# Patient Record
Sex: MALE | Race: BLACK OR AFRICAN AMERICAN | NOT HISPANIC OR LATINO | URBAN - METROPOLITAN AREA
[De-identification: names, ages, dates, MRNs, and addresses within clinical notes are randomized per-mention and may not be internally consistent; named-entity substitution may affect disease eponyms.]

---

## 2024-04-01 ENCOUNTER — DOCUMENTATION (OUTPATIENT)
Dept: OCCUPATIONAL THERAPY | Facility: CLINIC | Age: 64
End: 2024-04-01
Payer: COMMERCIAL

## 2024-04-01 NOTE — PROGRESS NOTES
Occupational Therapy                 Therapy Communication Note    Patient Name: Elijah Chase  MRN: [id number]  Today's Date: 4/1/2024     Discipline: Occupational Therapy    Missed Visit Reason: Patient did not attend today's scheduled OT evaluation.     Missed Time: No Show    Comment:

## 2024-04-03 ENCOUNTER — OFFICE VISIT (OUTPATIENT)
Dept: PRIMARY CARE | Facility: CLINIC | Age: 64
End: 2024-04-03
Payer: COMMERCIAL

## 2024-04-03 VITALS
SYSTOLIC BLOOD PRESSURE: 116 MMHG | HEIGHT: 69 IN | OXYGEN SATURATION: 98 % | BODY MASS INDEX: 27.11 KG/M2 | WEIGHT: 183 LBS | HEART RATE: 72 BPM | TEMPERATURE: 98.6 F | DIASTOLIC BLOOD PRESSURE: 78 MMHG | RESPIRATION RATE: 16 BRPM

## 2024-04-03 DIAGNOSIS — E11.9 TYPE 2 DIABETES MELLITUS WITHOUT COMPLICATION, WITHOUT LONG-TERM CURRENT USE OF INSULIN (MULTI): ICD-10-CM

## 2024-04-03 DIAGNOSIS — I69.328 SPEECH OR LANGUAGE DEFICIT, POST-STROKE: ICD-10-CM

## 2024-04-03 DIAGNOSIS — R53.1 WEAKNESS DUE TO ACUTE STROKE (MULTI): ICD-10-CM

## 2024-04-03 DIAGNOSIS — I63.9 WEAKNESS DUE TO ACUTE STROKE (MULTI): ICD-10-CM

## 2024-04-03 DIAGNOSIS — Z09 HOSPITAL DISCHARGE FOLLOW-UP: Primary | ICD-10-CM

## 2024-04-03 DIAGNOSIS — Z86.73 HISTORY OF STROKE: ICD-10-CM

## 2024-04-03 PROCEDURE — 1036F TOBACCO NON-USER: CPT | Performed by: FAMILY MEDICINE

## 2024-04-03 PROCEDURE — 3078F DIAST BP <80 MM HG: CPT | Performed by: FAMILY MEDICINE

## 2024-04-03 PROCEDURE — 3074F SYST BP LT 130 MM HG: CPT | Performed by: FAMILY MEDICINE

## 2024-04-03 PROCEDURE — 99203 OFFICE O/P NEW LOW 30 MIN: CPT | Performed by: FAMILY MEDICINE

## 2024-04-03 NOTE — PATIENT INSTRUCTIONS
Requested prior records (including discharge summary, most recent labs, medication list).  Referred to neurology.    F/U for med refills once medication list available.

## 2024-04-03 NOTE — PROGRESS NOTES
"Subjective   Patient ID: Elijah Chase is a 63 y.o. male who presents for Hospital Follow-up ([place]).    HPI   Initial visit.  No prior records.  Family present and provides some history.  Patient and family members are poor historians.    Reports stroke 3-4 wks ago in [place].  Has residual left side weakness and speech issues.  Sent home w/30 days of meds (does not know names or doses of meds) and will need refills.  States he was told to have outpatient PT/OT.  States paperwork was provided at time of discharge, but did not bring that paperwork today.  Other than the recent stroke, states he has a history of diabetes.  Does not think he has HTN, HLD, or any other conditions.    Reviewed/Updated Active problem list, PSH, SH.    Review of Systems  No other complaints.     Objective   /78   Pulse 72   Temp 37 °C (98.6 °F)   Resp 16   Ht 1.753 m (5' 9\")   Wt 83 kg (183 lb)   SpO2 98%   BMI 27.02 kg/m²     Physical Exam  Constitutional:       General: He is not in acute distress.     Appearance: He is overweight.   Cardiovascular:      Rate and Rhythm: Normal rate and regular rhythm.      Heart sounds: Normal heart sounds. No murmur heard.     No friction rub. No gallop.   Pulmonary:      Effort: Pulmonary effort is normal.      Breath sounds: Normal breath sounds. No wheezing, rhonchi or rales.   Musculoskeletal:      Comments: Ambulating w/Rollator   Neurological:      Mental Status: He is oriented to person, place, and time.   Psychiatric:         Mood and Affect: Mood normal.         Behavior: Behavior normal.     Assessment/Plan   Diagnoses and all orders for this visit:  Hospital discharge follow-up  -     Referral to Neurology; Future  History of stroke  -     Referral to Neurology; Future  Weakness due to acute stroke (CMS/HCC)  -     Referral to Neurology; Future  Speech or language deficit, post-stroke  -     Referral to Neurology; Future  Type 2 diabetes mellitus without complication, " without long-term current use of insulin (CMS/[facility])    Requested prior records (including discharge summary, most recent labs, medication list).  Referred to neurology.    F/U for med refills once medication list available.

## 2024-04-04 ENCOUNTER — DOCUMENTATION (OUTPATIENT)
Dept: PHYSICAL THERAPY | Facility: CLINIC | Age: 64
End: 2024-04-04
Payer: COMMERCIAL

## 2024-04-04 NOTE — PROGRESS NOTES
Physical Therapy                 Therapy Communication Note    Patient Name: Elijah Chase  MRN: [id number]  Today's Date: 4/4/2024     Discipline: Physical Therapy    Comment: No show to initial eval

## 2024-04-08 ENCOUNTER — HOME HEALTH ADMISSION (OUTPATIENT)
Dept: HOME HEALTH SERVICES | Facility: HOME HEALTH | Age: 64
End: 2024-04-08

## 2024-04-08 ENCOUNTER — CONSULT (OUTPATIENT)
Dept: PHYSICAL MEDICINE AND REHAB | Facility: CLINIC | Age: 64
End: 2024-04-08
Payer: COMMERCIAL

## 2024-04-08 ENCOUNTER — DOCUMENTATION (OUTPATIENT)
Dept: HOME HEALTH SERVICES | Facility: HOME HEALTH | Age: 64
End: 2024-04-08

## 2024-04-08 ENCOUNTER — APPOINTMENT (OUTPATIENT)
Dept: NEUROSURGERY | Facility: CLINIC | Age: 64
End: 2024-04-08
Payer: COMMERCIAL

## 2024-04-08 ENCOUNTER — LAB (OUTPATIENT)
Dept: LAB | Facility: LAB | Age: 64
End: 2024-04-08
Payer: COMMERCIAL

## 2024-04-08 VITALS
DIASTOLIC BLOOD PRESSURE: 66 MMHG | SYSTOLIC BLOOD PRESSURE: 100 MMHG | HEART RATE: 87 BPM | TEMPERATURE: 98.8 F | RESPIRATION RATE: 18 BRPM

## 2024-04-08 DIAGNOSIS — I63.9 WEAKNESS DUE TO ACUTE STROKE (MULTI): ICD-10-CM

## 2024-04-08 DIAGNOSIS — Z86.73 HISTORY OF STROKE: Primary | ICD-10-CM

## 2024-04-08 DIAGNOSIS — I63.9 WEAKNESS DUE TO ACUTE STROKE (MULTI): Primary | ICD-10-CM

## 2024-04-08 DIAGNOSIS — R53.1 WEAKNESS DUE TO ACUTE STROKE (MULTI): ICD-10-CM

## 2024-04-08 DIAGNOSIS — I69.328 SPEECH OR LANGUAGE DEFICIT, POST-STROKE: ICD-10-CM

## 2024-04-08 DIAGNOSIS — R30.0 DYSURIA: ICD-10-CM

## 2024-04-08 DIAGNOSIS — R53.1 WEAKNESS DUE TO ACUTE STROKE (MULTI): Primary | ICD-10-CM

## 2024-04-08 DIAGNOSIS — R45.89 DEPRESSED MOOD: ICD-10-CM

## 2024-04-08 PROBLEM — E78.49 OTHER HYPERLIPIDEMIA: Status: ACTIVE | Noted: 2024-04-08

## 2024-04-08 PROBLEM — I10 PRIMARY HYPERTENSION: Status: ACTIVE | Noted: 2024-04-08

## 2024-04-08 PROBLEM — E11.9 TYPE 2 DIABETES MELLITUS WITHOUT COMPLICATION, WITHOUT LONG-TERM CURRENT USE OF INSULIN (MULTI): Status: ACTIVE | Noted: 2024-04-08

## 2024-04-08 PROBLEM — F32.9 REACTIVE DEPRESSION: Status: ACTIVE | Noted: 2024-04-08

## 2024-04-08 LAB
ANION GAP SERPL CALC-SCNC: 18 MMOL/L (ref 10–20)
APPEARANCE UR: ABNORMAL
BILIRUB UR STRIP.AUTO-MCNC: NEGATIVE MG/DL
BUN SERPL-MCNC: 16 MG/DL (ref 6–23)
CALCIUM SERPL-MCNC: 9.8 MG/DL (ref 8.6–10.3)
CHLORIDE SERPL-SCNC: 96 MMOL/L (ref 98–107)
CO2 SERPL-SCNC: 24 MMOL/L (ref 21–32)
COLOR UR: YELLOW
CREAT SERPL-MCNC: 0.83 MG/DL (ref 0.5–1.3)
EGFRCR SERPLBLD CKD-EPI 2021: >90 ML/MIN/1.73M*2
GLUCOSE SERPL-MCNC: 92 MG/DL (ref 74–99)
GLUCOSE UR STRIP.AUTO-MCNC: NEGATIVE MG/DL
KETONES UR STRIP.AUTO-MCNC: ABNORMAL MG/DL
LEUKOCYTE ESTERASE UR QL STRIP.AUTO: ABNORMAL
MUCOUS THREADS #/AREA URNS AUTO: ABNORMAL /LPF
NITRITE UR QL STRIP.AUTO: POSITIVE
PH UR STRIP.AUTO: 5.5 [PH]
POTASSIUM SERPL-SCNC: 4.4 MMOL/L (ref 3.5–5.3)
PROT UR STRIP.AUTO-MCNC: ABNORMAL MG/DL
RBC # UR STRIP.AUTO: ABNORMAL /UL
RBC #/AREA URNS AUTO: >20 /HPF
SODIUM SERPL-SCNC: 134 MMOL/L (ref 136–145)
SP GR UR STRIP.AUTO: >=1.03
UROBILINOGEN UR STRIP.AUTO-MCNC: 1 MG/DL
WBC #/AREA URNS AUTO: >50 /HPF
WBC CLUMPS #/AREA URNS AUTO: ABNORMAL /HPF

## 2024-04-08 PROCEDURE — 36415 COLL VENOUS BLD VENIPUNCTURE: CPT

## 2024-04-08 PROCEDURE — 87186 SC STD MICRODIL/AGAR DIL: CPT

## 2024-04-08 PROCEDURE — 80048 BASIC METABOLIC PNL TOTAL CA: CPT

## 2024-04-08 PROCEDURE — 81001 URINALYSIS AUTO W/SCOPE: CPT

## 2024-04-08 PROCEDURE — 99204 OFFICE O/P NEW MOD 45 MIN: CPT | Performed by: PHYSICAL MEDICINE & REHABILITATION

## 2024-04-08 PROCEDURE — 99214 OFFICE O/P EST MOD 30 MIN: CPT | Mod: GC | Performed by: PHYSICAL MEDICINE & REHABILITATION

## 2024-04-08 PROCEDURE — 87086 URINE CULTURE/COLONY COUNT: CPT

## 2024-04-08 RX ORDER — METFORMIN HYDROCHLORIDE EXTENDED-RELEASE TABLETS 500 MG/1
500 TABLET, FILM COATED, EXTENDED RELEASE ORAL
COMMUNITY
End: 2024-04-29 | Stop reason: SDUPTHER

## 2024-04-08 RX ORDER — INSULIN GLARGINE 100 [IU]/ML
10 INJECTION, SOLUTION SUBCUTANEOUS NIGHTLY
COMMUNITY
End: 2024-04-29 | Stop reason: SDUPTHER

## 2024-04-08 RX ORDER — BISMUTH SUBSALICYLATE 262 MG
1 TABLET,CHEWABLE ORAL DAILY
COMMUNITY

## 2024-04-08 RX ORDER — ATORVASTATIN CALCIUM 40 MG/1
40 TABLET, FILM COATED ORAL DAILY
COMMUNITY
End: 2024-04-29 | Stop reason: SDUPTHER

## 2024-04-08 RX ORDER — LISINOPRIL 20 MG/1
20 TABLET ORAL DAILY
COMMUNITY
End: 2024-04-29 | Stop reason: SDUPTHER

## 2024-04-08 RX ORDER — ESCITALOPRAM OXALATE 10 MG/1
10 TABLET ORAL DAILY
COMMUNITY

## 2024-04-08 RX ORDER — ASPIRIN 81 MG/1
81 TABLET ORAL DAILY
COMMUNITY
End: 2024-04-29 | Stop reason: SDUPTHER

## 2024-04-08 ASSESSMENT — PAIN SCALES - GENERAL: PAINLEVEL: 5

## 2024-04-08 NOTE — PROGRESS NOTES
HPI  Mr Elijah Chase, a 63 y.o. year old male with pmhx of HTN and DM here with his daughter and brother here for evaluation of stroke. Patient had a right sided stroke 3/8/24 at OSH in [place]. Patient went to inpatient rehabilitation at [facility] for 2 weeks and completed IPR on 3/31/24. They did not set patient up with PT or OT as they were not within the state. Has not been doing formal therapy since discharge from IPR. Deficits following stroke include left sided weakness, difficulties with swallowing, speech, cognition but symptoms have been improving. Reports dysuria, as well as difficulties with urinary urgency, incontinence, foul smelling urine. No urinary issues prior to CVA. Currently urinating with bedside urinal. Also having right sided flank pain. Also having difficulties with vision on left, possible neglect on the left side. Denies any spasticity.    Reports depressed mood, crying, difficulty adjusting. Also having difficulties with sleep. Difficulties staying asleep, taking melatonin sporadically from daughter.     They do not have imaging or records from hospitalization or IPR with him    IMAGING: Had done at OSH, no records in system, unclear location of stroke, likely right sided with left sided deficits based on presentation     FUNCTIONAL HISTORY: Using FWW to ambulate, was independent prior to CVA     SH:  Patient ambulates with a FWW. Patient was completed independent prior to CVA. Does need help with dressing. Has rails for bathroom. He is currently living with his daughter and daughters , 1 JANNA, bed and bath on first floor. Living in [place]  Occupation: Was driving a forklift prior to CVA  Tobacco/Alcohol/Drugs : Denies    PE:  GEN - Alert, well-developed, well-nourished, no acute distress  PSYCH - Cooperative, appropriate mood and affect  HEENT - NC/AT  RESP - Non-labored respirations, equal expansion  CV - warm and well-perfused, No cyanosis or edema in extremities. DP pulses  2+ bilaterally  ABD- soft, ND  SKIN - No rash.    Mild dysarthria, delayed recall/processing     NEURO - UE strength 4+/5 on left, 5/5 on right-  including shoulder abduction, biceps, triceps, wrist extensors, finger flexors, interossei, and   LE strength 4+/5 on left, 5/5 on right -  including hip flexors, knee flexors, knee extensors, ankle dorsiflexors, ankle plantar flexors, and EHL  Sensation - intact to light touch in bilateral lower extremities.  GAIT - Uses FWW, poor balance  Dysdiadochokinesia with difficulty on right, F-N difficulty L worse than right  CN testing: Left sided facial droop, otherwise in tact in cranial nerves (other than VII)    Impression/PLAN:  Elijah Chase is a 63 year old male with pmhx of HTN, DM here for evaluation of CVA. Patient does not have imaging records with him but stroke appears likely right sided with left sided deficits. Would need prior/new MRI to determine exact location of infarct. Deficits include left sided weakness, speech, cognitive, swallowing difficulties. Also difficulties with depressed mood.    -New PT and OT referrals placed, emphasis on neurologic aspect of therapy  -already has ST ordered through PCP for speech and cog, dysarthria, word finding difficulties. Per family is on normal diet.  -Psych referral placed for adjustment  -UA ordered due to dysuria, discolored urine, frequency. Potential UTI although neurogenic bladder may be contributing  -Handicap placard ordered  -Agree with establishing with neurology, PCP placed neuro referral. Discussed to get records from inpatient hospitalization re location of stroke etc  -BMP ordered for baseline lab and kidney function  -Follow up in 6-8 weeks        The patient expressed understanding and agreement with the assessment and plan. Patient encouraged to contact us should they have any questions, concerns, or any changes in symptoms.     Thank you for allowing me to participate in the care of your  patient.    Patient seen and examined by resident physician with attending physician supervision, Dr. Marinelli. Attending agrees with history, assessment, and plan as described in note.    Cheri Spain DO   PM&R PGY-4     Supervisory note:  Seen with Dr Spain, resident.  I saw and evaluated the patient. I personally obtained the key and critical portions of the history and physical exam. I reviewed the resident's documentation and discussed the patient with the resident. I agree with the resident's medical decision making as documented in the resident's note.       Priyanka Randle MD     Division of PM&R

## 2024-04-08 NOTE — HH CARE COORDINATION
Home Care received a referral for Home Health Aide. Unfortunately, we are unable to accept and process the referral at this time.    Patients, please reach out to the referring provider or your PCP to assist in obtaining an alternative home care agency and/or guidance to meet your needs.    Providers, please reach out to  Home Care with any questions regarding the declined referral.

## 2024-04-09 ENCOUNTER — TELEPHONE (OUTPATIENT)
Dept: PHYSICAL MEDICINE AND REHAB | Facility: CLINIC | Age: 64
End: 2024-04-09
Payer: COMMERCIAL

## 2024-04-09 DIAGNOSIS — N39.0 URINARY TRACT INFECTION WITHOUT HEMATURIA, SITE UNSPECIFIED: Primary | ICD-10-CM

## 2024-04-09 RX ORDER — NITROFURANTOIN 25; 75 MG/1; MG/1
100 CAPSULE ORAL 2 TIMES DAILY
Qty: 14 CAPSULE | Refills: 0 | Status: SHIPPED | OUTPATIENT
Start: 2024-04-09 | End: 2024-04-16

## 2024-04-09 NOTE — TELEPHONE ENCOUNTER
UA is  positive for UTI, will call in bactrim and wait for culture as well but he can start on the anti biotic now.

## 2024-04-10 ENCOUNTER — TELEPHONE (OUTPATIENT)
Dept: PHYSICAL MEDICINE AND REHAB | Facility: CLINIC | Age: 64
End: 2024-04-10
Payer: COMMERCIAL

## 2024-04-10 NOTE — TELEPHONE ENCOUNTER
Pt daughter called and was wondering if she could get a hyperbaric chamber.      Also she was wondering if anything else showed up on the urine culture.

## 2024-04-11 ENCOUNTER — TELEPHONE (OUTPATIENT)
Dept: PHYSICAL MEDICINE AND REHAB | Facility: CLINIC | Age: 64
End: 2024-04-11
Payer: COMMERCIAL

## 2024-04-11 DIAGNOSIS — R30.0 DYSURIA: ICD-10-CM

## 2024-04-11 DIAGNOSIS — R45.89 DEPRESSED MOOD: ICD-10-CM

## 2024-04-11 DIAGNOSIS — Z74.09 IMPAIRED MOBILITY AND ACTIVITIES OF DAILY LIVING: ICD-10-CM

## 2024-04-11 DIAGNOSIS — Z78.9 IMPAIRED MOBILITY AND ACTIVITIES OF DAILY LIVING: ICD-10-CM

## 2024-04-11 DIAGNOSIS — I63.9 WEAKNESS DUE TO ACUTE STROKE (MULTI): Primary | ICD-10-CM

## 2024-04-11 DIAGNOSIS — I69.328 SPEECH OR LANGUAGE DEFICIT, POST-STROKE: ICD-10-CM

## 2024-04-11 DIAGNOSIS — R53.1 WEAKNESS DUE TO ACUTE STROKE (MULTI): Primary | ICD-10-CM

## 2024-04-11 LAB — BACTERIA UR CULT: ABNORMAL

## 2024-04-11 NOTE — TELEPHONE ENCOUNTER
The bacteria in the urine is susceptible to the anti biotic.  I am not sure what she is referring to and I have not ordered this/not sure its something that can be ordered for the house. I have not heard of this use after a stroke.

## 2024-04-12 ENCOUNTER — DOCUMENTATION (OUTPATIENT)
Dept: HOME HEALTH SERVICES | Facility: HOME HEALTH | Age: 64
End: 2024-04-12
Payer: COMMERCIAL

## 2024-04-12 ENCOUNTER — HOME HEALTH ADMISSION (OUTPATIENT)
Dept: HOME HEALTH SERVICES | Facility: HOME HEALTH | Age: 64
End: 2024-04-12
Payer: COMMERCIAL

## 2024-04-12 NOTE — HH CARE COORDINATION
Home Care received a Referral for Nursing, Physical Therapy, Occupational Therapy, Home Health Aide, Speech Language Pathology, and Medical Social Work. We have processed the referral for a Start of Care on 24-48 HOURS .     If you have any questions or concerns, please feel free to contact us at [phone]. Follow the prompts, enter your five digit zip code, and you will be directed to your care team on CENT[facility] 2.

## 2024-04-13 ENCOUNTER — HOME CARE VISIT (OUTPATIENT)
Dept: HOME HEALTH SERVICES | Facility: HOME HEALTH | Age: 64
End: 2024-04-13
Payer: COMMERCIAL

## 2024-04-13 PROCEDURE — 0023 HH SOC

## 2024-04-13 PROCEDURE — G0299 HHS/HOSPICE OF RN EA 15 MIN: HCPCS

## 2024-04-14 ENCOUNTER — HOME CARE VISIT (OUTPATIENT)
Dept: HOME HEALTH SERVICES | Facility: HOME HEALTH | Age: 64
End: 2024-04-14
Payer: COMMERCIAL

## 2024-04-14 VITALS
OXYGEN SATURATION: 98 % | DIASTOLIC BLOOD PRESSURE: 60 MMHG | SYSTOLIC BLOOD PRESSURE: 120 MMHG | TEMPERATURE: 98.7 F | RESPIRATION RATE: 18 BRPM | HEIGHT: 69 IN | BODY MASS INDEX: 27.11 KG/M2 | WEIGHT: 183 LBS | HEART RATE: 72 BPM

## 2024-04-14 DIAGNOSIS — E11.9 TYPE 2 DIABETES MELLITUS WITHOUT COMPLICATION, WITHOUT LONG-TERM CURRENT USE OF INSULIN (MULTI): Primary | ICD-10-CM

## 2024-04-14 PROCEDURE — G0151 HHCP-SERV OF PT,EA 15 MIN: HCPCS

## 2024-04-14 RX ORDER — PEN NEEDLE, DIABETIC 30 GX3/16"
NEEDLE, DISPOSABLE MISCELLANEOUS
Qty: 100 EACH | Refills: 3 | Status: SHIPPED | OUTPATIENT
Start: 2024-04-14 | End: 2024-04-16 | Stop reason: RX

## 2024-04-14 SDOH — HEALTH STABILITY: PHYSICAL HEALTH: EXERCISE TYPE: STANDING HEP FOR BALANCE AND STRENGTH. SEATED LLE EXERCISES WITH RED THERA BAND.

## 2024-04-14 SDOH — ECONOMIC STABILITY: HOUSING INSECURITY: HOME SAFETY: PATIENT WAS NOT USING THE BRAKES ON THE ROLLATOR BEFORE SITTING DOWN ON IT.

## 2024-04-14 ASSESSMENT — ENCOUNTER SYMPTOMS
APPETITE LEVEL: GOOD
DEPRESSION: 0
PERSON REPORTING PAIN: PATIENT
DENIES PAIN: 1
MUSCLE WEAKNESS: 1
CHANGE IN APPETITE: UNCHANGED
OCCASIONAL FEELINGS OF UNSTEADINESS: 1
OCCASIONAL FEELINGS OF UNSTEADINESS: 1
DENIES PAIN: 1
LOSS OF SENSATION IN FEET: 0

## 2024-04-14 ASSESSMENT — PAIN SCALES - PAIN ASSESSMENT IN ADVANCED DEMENTIA (PAINAD)
BODYLANGUAGE: 0
BODYLANGUAGE: 0 - RELAXED.
FACIALEXPRESSION: 0
CONSOLABILITY: 0
CONSOLABILITY: 0 - NO NEED TO CONSOLE.
NEGVOCALIZATION: 0 - NONE.
NEGVOCALIZATION: 0
FACIALEXPRESSION: 0 - SMILING OR INEXPRESSIVE.

## 2024-04-14 ASSESSMENT — ACTIVITIES OF DAILY LIVING (ADL)
OASIS_M1830: 02
ENTERING_EXITING_HOME: NEEDS ASSISTANCE

## 2024-04-15 ENCOUNTER — HOME CARE VISIT (OUTPATIENT)
Dept: HOME HEALTH SERVICES | Facility: HOME HEALTH | Age: 64
End: 2024-04-15
Payer: COMMERCIAL

## 2024-04-15 VITALS
DIASTOLIC BLOOD PRESSURE: 70 MMHG | TEMPERATURE: 97.9 F | HEART RATE: 60 BPM | SYSTOLIC BLOOD PRESSURE: 110 MMHG | OXYGEN SATURATION: 98 %

## 2024-04-15 PROCEDURE — G0152 HHCP-SERV OF OT,EA 15 MIN: HCPCS

## 2024-04-15 PROCEDURE — G0155 HHCP-SVS OF CSW,EA 15 MIN: HCPCS

## 2024-04-15 SDOH — SOCIAL STABILITY: SOCIAL NETWORK: HELP FROM FAMILY/FRIENDS: YES

## 2024-04-15 SDOH — HEALTH STABILITY: MENTAL HEALTH: STRESS FACTORS COMMENTS: RECENT STROKE

## 2024-04-15 ASSESSMENT — ACTIVITIES OF DAILY LIVING (ADL)
BATHING ASSESSED: 1
LAUNDRY_REQUIRES_ASSISTANCE: 1
DRESSING_UB_CURRENT_FUNCTION: STAND BY ASSIST
WASHING_LB_CURRENT_FUNCTION: MINIMUM ASSIST
WASHING_UPB_CURRENT_FUNCTION: MINIMUM ASSIST
SHOPPING_REQUIRES_ASSISTANCE: 1
BATHING_CURRENT_FUNCTION: MINIMUM ASSIST
DRESSING_LB_CURRENT_FUNCTION: STAND BY ASSIST

## 2024-04-15 ASSESSMENT — ENCOUNTER SYMPTOMS
DENIES PAIN: 1
PERSON REPORTING PAIN: PATIENT

## 2024-04-16 ENCOUNTER — APPOINTMENT (OUTPATIENT)
Dept: NEUROSURGERY | Facility: CLINIC | Age: 64
End: 2024-04-16
Payer: COMMERCIAL

## 2024-04-16 DIAGNOSIS — E11.9 TYPE 2 DIABETES MELLITUS WITHOUT COMPLICATION, WITHOUT LONG-TERM CURRENT USE OF INSULIN (MULTI): ICD-10-CM

## 2024-04-16 RX ORDER — PEN NEEDLE, DIABETIC 30 GX3/16"
NEEDLE, DISPOSABLE MISCELLANEOUS
Qty: 100 EACH | Refills: 3 | Status: SHIPPED | OUTPATIENT
Start: 2024-04-16

## 2024-04-17 ENCOUNTER — HOME CARE VISIT (OUTPATIENT)
Dept: HOME HEALTH SERVICES | Facility: HOME HEALTH | Age: 64
End: 2024-04-17
Payer: COMMERCIAL

## 2024-04-17 ENCOUNTER — APPOINTMENT (OUTPATIENT)
Dept: PHYSICAL THERAPY | Facility: HOSPITAL | Age: 64
End: 2024-04-17
Payer: COMMERCIAL

## 2024-04-17 PROCEDURE — G0156 HHCP-SVS OF AIDE,EA 15 MIN: HCPCS

## 2024-04-18 ENCOUNTER — HOME CARE VISIT (OUTPATIENT)
Dept: HOME HEALTH SERVICES | Facility: HOME HEALTH | Age: 64
End: 2024-04-18
Payer: COMMERCIAL

## 2024-04-18 VITALS — HEART RATE: 66 BPM | TEMPERATURE: 98.1 F

## 2024-04-18 PROCEDURE — G0180 MD CERTIFICATION HHA PATIENT: HCPCS | Performed by: PHYSICAL MEDICINE & REHABILITATION

## 2024-04-18 PROCEDURE — G0158 HHC OT ASSISTANT EA 15: HCPCS | Mod: CO

## 2024-04-19 ENCOUNTER — HOME CARE VISIT (OUTPATIENT)
Dept: HOME HEALTH SERVICES | Facility: HOME HEALTH | Age: 64
End: 2024-04-19
Payer: COMMERCIAL

## 2024-04-19 PROCEDURE — G0157 HHC PT ASSISTANT EA 15: HCPCS | Mod: CQ

## 2024-04-19 PROCEDURE — G0156 HHCP-SVS OF AIDE,EA 15 MIN: HCPCS

## 2024-04-22 ENCOUNTER — HOME CARE VISIT (OUTPATIENT)
Dept: HOME HEALTH SERVICES | Facility: HOME HEALTH | Age: 64
End: 2024-04-22
Payer: COMMERCIAL

## 2024-04-22 VITALS — HEART RATE: 78 BPM | DIASTOLIC BLOOD PRESSURE: 52 MMHG | SYSTOLIC BLOOD PRESSURE: 117 MMHG

## 2024-04-22 PROCEDURE — G0153 HHCP-SVS OF S/L PATH,EA 15MN: HCPCS

## 2024-04-22 PROCEDURE — G0157 HHC PT ASSISTANT EA 15: HCPCS | Mod: CQ

## 2024-04-23 ENCOUNTER — HOME CARE VISIT (OUTPATIENT)
Dept: HOME HEALTH SERVICES | Facility: HOME HEALTH | Age: 64
End: 2024-04-23
Payer: COMMERCIAL

## 2024-04-23 VITALS
RESPIRATION RATE: 16 BRPM | SYSTOLIC BLOOD PRESSURE: 107 MMHG | DIASTOLIC BLOOD PRESSURE: 56 MMHG | TEMPERATURE: 98.1 F | HEART RATE: 71 BPM

## 2024-04-23 PROCEDURE — G0158 HHC OT ASSISTANT EA 15: HCPCS | Mod: CO

## 2024-04-23 PROCEDURE — G0156 HHCP-SVS OF AIDE,EA 15 MIN: HCPCS

## 2024-04-23 NOTE — PROGRESS NOTES
NPV - Presented to [facility] in [place], [place] on 3/8/2024 with wake up stroke symptoms of left sided weakness and facial droop he was out of the window for tPA. Diagnostic Angio on admit noted Right M1 Severe stenosis with delayed flow. Angio on 3/10/24 by Dr. 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 He has now moved to [place] to be with his daughter and family. Here for follow up stenting.     Elijah Chase is a 63 y.o. year old male    HPI  Mr. Chase is a 63-year-old gentleman who was in [facility] when he developed left-sided weakness and facial droop and diagnosed with wake-up stroke.  He was treated at [facility] with a balloon mounted coronary stent for a severe right M1 stenosis.   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  He was given a prescription for 30 days of aspirin and Brilinta and was discharged to rehab.  He was told to follow-up with a neurosurgeon up in [place] where he lives.    He has residual left-sided weakness and left facial droop.  He has some imbalance due to the weakness    Review of Systems       No past medical history on file.    No past surgical history on file.        Current Outpatient Medications:     amoxicillin-pot clavulanate (Augmentin) 875-125 mg tablet, Take 1 tablet (875 mg) by mouth 2 times a day for 10 days. With food, Disp: 20 tablet, Rfl: 0    aspirin 81 mg EC tablet, Take 1 tablet (81 mg) by mouth once daily., Disp: 5 tablet, Rfl: 0    atorvastatin (Lipitor) 40 mg tablet, Take 1 tablet (40 mg) by mouth once daily., Disp: 5 tablet, Rfl: 0    insulin glargine (Lantus U-100 Insulin) 100 unit/mL injection, Inject 10 Units under the skin once daily at bedtime. Take as directed per insulin instructions., Disp: 10 mL, Rfl: 0    lisinopril 20 mg tablet, Take 1  "tablet (20 mg) by mouth once daily., Disp: 5 tablet, Rfl: 0    metFORMIN, OSM, (Fortamet) 500 mg 24 hr tablet, Take 1 tablet (500 mg) by mouth once daily in the evening. Take with meals. Do not crush, chew, or split., Disp: 5 tablet, Rfl: 0    multivitamin tablet, Take 1 tablet by mouth once daily., Disp: , Rfl:     pen needle, diabetic 32 gauge x 5/32\" needle, Use daily w/Lantus, Disp: 100 each, Rfl: 3    vitamin D3-vitamin K2, MK4, 1,000-100 unit-mcg tablet, Take 1,000 Units by mouth once daily., Disp: , Rfl:     escitalopram (Lexapro) 10 mg tablet, Take 1 tablet (10 mg) by mouth once daily., Disp: , Rfl:     ticagrelor (Brilinta) 90 mg tablet, Take 1 tablet (90 mg) by mouth 2 times a day., Disp: 60 tablet, Rfl: 5        Objective   Vitals:    04/30/24 0957   BP: 109/68   Pulse: 57   Resp: 16   Temp: 36.3 °C (97.4 °F)       Neurological Exam  AAO x 3  PERRL, EOMI, TML, left facial droop  5/5 on right, 4/5 on left  Senorsy intact  No drift  Able to ambulate with a walker    [unfilled]  No imaging.      Assessment/Plan     I had the pleasure of seeing Mr. Chase and his daughter and had a thorough discussion was reviewed his reports on her phone.    Per report, he had a balloon mounted coronary stent of unknown name and brand that was placed in his right M1 for severe symptomatic stenosis.  There was also an iatrogenic right cavernous dissection that required an [place] stent.    I have refilled his aspirin and Brilinta for the next 5 months.  He will need a cerebral angiogram to evaluate in about 6 months from the procedure which occurred in March 2024.  Will also refer him to a stroke neurology for stroke risk reduction management.    All question was answered to her satisfaction.    "

## 2024-04-24 ENCOUNTER — APPOINTMENT (OUTPATIENT)
Dept: SPEECH THERAPY | Facility: CLINIC | Age: 64
End: 2024-04-24
Payer: COMMERCIAL

## 2024-04-24 ENCOUNTER — HOME CARE VISIT (OUTPATIENT)
Dept: HOME HEALTH SERVICES | Facility: HOME HEALTH | Age: 64
End: 2024-04-24
Payer: COMMERCIAL

## 2024-04-24 ENCOUNTER — APPOINTMENT (OUTPATIENT)
Dept: OCCUPATIONAL THERAPY | Facility: CLINIC | Age: 64
End: 2024-04-24
Payer: COMMERCIAL

## 2024-04-24 ENCOUNTER — APPOINTMENT (OUTPATIENT)
Dept: PHYSICAL THERAPY | Facility: CLINIC | Age: 64
End: 2024-04-24

## 2024-04-24 PROCEDURE — G0157 HHC PT ASSISTANT EA 15: HCPCS | Mod: CQ

## 2024-04-25 ENCOUNTER — HOME CARE VISIT (OUTPATIENT)
Dept: HOME HEALTH SERVICES | Facility: HOME HEALTH | Age: 64
End: 2024-04-25
Payer: COMMERCIAL

## 2024-04-25 VITALS
SYSTOLIC BLOOD PRESSURE: 108 MMHG | TEMPERATURE: 98.1 F | DIASTOLIC BLOOD PRESSURE: 70 MMHG | HEART RATE: 82 BPM | RESPIRATION RATE: 16 BRPM

## 2024-04-25 DIAGNOSIS — E11.9 TYPE 2 DIABETES MELLITUS WITHOUT COMPLICATION, WITHOUT LONG-TERM CURRENT USE OF INSULIN (MULTI): Primary | ICD-10-CM

## 2024-04-25 PROCEDURE — G0158 HHC OT ASSISTANT EA 15: HCPCS | Mod: CO

## 2024-04-26 ENCOUNTER — LAB (OUTPATIENT)
Dept: LAB | Facility: LAB | Age: 64
End: 2024-04-26
Payer: COMMERCIAL

## 2024-04-26 ENCOUNTER — TELEPHONE (OUTPATIENT)
Dept: PHYSICAL MEDICINE AND REHAB | Facility: CLINIC | Age: 64
End: 2024-04-26

## 2024-04-26 DIAGNOSIS — R30.0 DYSURIA: ICD-10-CM

## 2024-04-26 DIAGNOSIS — E11.9 TYPE 2 DIABETES MELLITUS WITHOUT COMPLICATION, WITHOUT LONG-TERM CURRENT USE OF INSULIN (MULTI): ICD-10-CM

## 2024-04-26 DIAGNOSIS — R30.0 DYSURIA: Primary | ICD-10-CM

## 2024-04-26 PROCEDURE — 87186 SC STD MICRODIL/AGAR DIL: CPT

## 2024-04-26 PROCEDURE — 81001 URINALYSIS AUTO W/SCOPE: CPT

## 2024-04-26 PROCEDURE — 87086 URINE CULTURE/COLONY COUNT: CPT

## 2024-04-26 PROCEDURE — 82043 UR ALBUMIN QUANTITATIVE: CPT

## 2024-04-26 PROCEDURE — 82570 ASSAY OF URINE CREATININE: CPT

## 2024-04-26 NOTE — TELEPHONE ENCOUNTER
Pt daughter called and stated that pt is still having difficulty with UTI.  Still has same symptoms dark urine, pain, difficulty with urination.  Pt daughter wanted to know if you could prescribe another antibiotic.

## 2024-04-27 ENCOUNTER — APPOINTMENT (OUTPATIENT)
Dept: HOME HEALTH SERVICES | Facility: HOME HEALTH | Age: 64
End: 2024-04-27
Payer: COMMERCIAL

## 2024-04-27 LAB
APPEARANCE UR: ABNORMAL
BACTERIA #/AREA URNS AUTO: ABNORMAL /HPF
BILIRUB UR STRIP.AUTO-MCNC: NEGATIVE MG/DL
COLOR UR: ABNORMAL
GLUCOSE UR STRIP.AUTO-MCNC: NORMAL MG/DL
HOLD SPECIMEN: NORMAL
KETONES UR STRIP.AUTO-MCNC: NEGATIVE MG/DL
LEUKOCYTE ESTERASE UR QL STRIP.AUTO: ABNORMAL
MUCOUS THREADS #/AREA URNS AUTO: ABNORMAL /LPF
NITRITE UR QL STRIP.AUTO: NEGATIVE
PH UR STRIP.AUTO: 5 [PH]
PROT UR STRIP.AUTO-MCNC: NEGATIVE MG/DL
RBC # UR STRIP.AUTO: ABNORMAL /UL
RBC #/AREA URNS AUTO: ABNORMAL /HPF
SP GR UR STRIP.AUTO: 1.01
UROBILINOGEN UR STRIP.AUTO-MCNC: NORMAL MG/DL
WBC #/AREA URNS AUTO: >50 /HPF
WBC CLUMPS #/AREA URNS AUTO: ABNORMAL /HPF

## 2024-04-29 ENCOUNTER — TELEPHONE (OUTPATIENT)
Dept: PRIMARY CARE | Facility: CLINIC | Age: 64
End: 2024-04-29
Payer: COMMERCIAL

## 2024-04-29 ENCOUNTER — APPOINTMENT (OUTPATIENT)
Dept: HOME HEALTH SERVICES | Facility: HOME HEALTH | Age: 64
End: 2024-04-29
Payer: COMMERCIAL

## 2024-04-29 ENCOUNTER — HOME CARE VISIT (OUTPATIENT)
Dept: HOME HEALTH SERVICES | Facility: HOME HEALTH | Age: 64
End: 2024-04-29
Payer: COMMERCIAL

## 2024-04-29 VITALS — DIASTOLIC BLOOD PRESSURE: 76 MMHG | SYSTOLIC BLOOD PRESSURE: 130 MMHG | HEART RATE: 81 BPM

## 2024-04-29 DIAGNOSIS — E11.9 TYPE 2 DIABETES MELLITUS WITHOUT COMPLICATION, WITHOUT LONG-TERM CURRENT USE OF INSULIN (MULTI): ICD-10-CM

## 2024-04-29 DIAGNOSIS — N39.0 URINARY TRACT INFECTION WITHOUT HEMATURIA, SITE UNSPECIFIED: Primary | ICD-10-CM

## 2024-04-29 DIAGNOSIS — E78.49 OTHER HYPERLIPIDEMIA: ICD-10-CM

## 2024-04-29 DIAGNOSIS — I10 PRIMARY HYPERTENSION: Primary | ICD-10-CM

## 2024-04-29 DIAGNOSIS — E11.9 TYPE 2 DIABETES MELLITUS WITHOUT COMPLICATION, WITH LONG-TERM CURRENT USE OF INSULIN (MULTI): Primary | ICD-10-CM

## 2024-04-29 DIAGNOSIS — Z79.4 TYPE 2 DIABETES MELLITUS WITHOUT COMPLICATION, WITH LONG-TERM CURRENT USE OF INSULIN (MULTI): Primary | ICD-10-CM

## 2024-04-29 LAB — BACTERIA UR CULT: ABNORMAL

## 2024-04-29 PROCEDURE — G0153 HHCP-SVS OF S/L PATH,EA 15MN: HCPCS

## 2024-04-29 PROCEDURE — G0157 HHC PT ASSISTANT EA 15: HCPCS | Mod: CQ

## 2024-04-29 RX ORDER — AMOXICILLIN AND CLAVULANATE POTASSIUM 875; 125 MG/1; MG/1
875 TABLET, FILM COATED ORAL 2 TIMES DAILY
Qty: 20 TABLET | Refills: 0 | Status: SHIPPED | OUTPATIENT
Start: 2024-04-29 | End: 2024-06-11 | Stop reason: ALTCHOICE

## 2024-04-29 RX ORDER — INSULIN GLARGINE 100 [IU]/ML
10 INJECTION, SOLUTION SUBCUTANEOUS NIGHTLY
Qty: 10 ML | Refills: 0 | Status: SHIPPED | OUTPATIENT
Start: 2024-04-29 | End: 2024-04-30 | Stop reason: DRUGHIGH

## 2024-04-29 RX ORDER — LISINOPRIL 20 MG/1
20 TABLET ORAL DAILY
Qty: 5 TABLET | Refills: 0 | Status: SHIPPED | OUTPATIENT
Start: 2024-04-29 | End: 2024-04-30 | Stop reason: SDUPTHER

## 2024-04-29 RX ORDER — NITROFURANTOIN 25; 75 MG/1; MG/1
100 CAPSULE ORAL 2 TIMES DAILY
Qty: 14 CAPSULE | Refills: 0 | Status: SHIPPED | OUTPATIENT
Start: 2024-04-29 | End: 2024-04-29 | Stop reason: ALTCHOICE

## 2024-04-29 RX ORDER — ASPIRIN 81 MG/1
81 TABLET ORAL DAILY
Qty: 5 TABLET | Refills: 0 | Status: SHIPPED | OUTPATIENT
Start: 2024-04-29 | End: 2024-04-30 | Stop reason: SDUPTHER

## 2024-04-29 RX ORDER — ATORVASTATIN CALCIUM 40 MG/1
40 TABLET, FILM COATED ORAL DAILY
Qty: 5 TABLET | Refills: 0 | Status: SHIPPED | OUTPATIENT
Start: 2024-04-29 | End: 2024-04-30 | Stop reason: SDUPTHER

## 2024-04-29 RX ORDER — METFORMIN HYDROCHLORIDE EXTENDED-RELEASE TABLETS 500 MG/1
500 TABLET, FILM COATED, EXTENDED RELEASE ORAL
Qty: 5 TABLET | Refills: 0 | Status: SHIPPED | OUTPATIENT
Start: 2024-04-29 | End: 2024-04-30 | Stop reason: DRUGHIGH

## 2024-04-29 NOTE — TELEPHONE ENCOUNTER
Lisinipril 20mg 1 tab daily     Metformin er 500mg  1 TAB DAILY Asa 81 mg 1 TAB DAILY     Brilinita 90 mg 1 tab twice daily     Atorvastin  40 mg tabs daily      Lantus 100/ 10 units at       Pharmacy   [facility]     Daughter asking for 90 days for refills   Nesx appt is for  5/2/24  should they keep it?

## 2024-04-29 NOTE — TELEPHONE ENCOUNTER
Pt daughter called and saw that culture came backing showing pt has another UTI.  Pt is in a lot of discomfort and was hoping that a prescription could be called in.    Also daughter is concerned because her father only has 1 more day of medication for all of his medications including the ones from his stoke and she can not get a response from his PCP and she is hoping you can help out with that.  He also needs a refill on prescriptions that you order for him.    Please advise

## 2024-04-30 ENCOUNTER — OFFICE VISIT (OUTPATIENT)
Dept: NEUROSURGERY | Facility: CLINIC | Age: 64
End: 2024-04-30
Payer: COMMERCIAL

## 2024-04-30 ENCOUNTER — HOME CARE VISIT (OUTPATIENT)
Dept: HOME HEALTH SERVICES | Facility: HOME HEALTH | Age: 64
End: 2024-04-30
Payer: COMMERCIAL

## 2024-04-30 ENCOUNTER — TELEPHONE (OUTPATIENT)
Dept: PHYSICAL MEDICINE AND REHAB | Facility: CLINIC | Age: 64
End: 2024-04-30

## 2024-04-30 VITALS
TEMPERATURE: 97.4 F | DIASTOLIC BLOOD PRESSURE: 68 MMHG | WEIGHT: 183 LBS | HEIGHT: 69 IN | HEART RATE: 57 BPM | RESPIRATION RATE: 16 BRPM | SYSTOLIC BLOOD PRESSURE: 109 MMHG | BODY MASS INDEX: 27.11 KG/M2

## 2024-04-30 VITALS
TEMPERATURE: 98.3 F | SYSTOLIC BLOOD PRESSURE: 127 MMHG | RESPIRATION RATE: 16 BRPM | HEART RATE: 65 BPM | DIASTOLIC BLOOD PRESSURE: 71 MMHG

## 2024-04-30 DIAGNOSIS — I10 PRIMARY HYPERTENSION: ICD-10-CM

## 2024-04-30 DIAGNOSIS — E11.9 TYPE 2 DIABETES MELLITUS WITHOUT COMPLICATION, WITHOUT LONG-TERM CURRENT USE OF INSULIN (MULTI): Primary | ICD-10-CM

## 2024-04-30 DIAGNOSIS — E11.9 TYPE 2 DIABETES MELLITUS WITHOUT COMPLICATION, WITHOUT LONG-TERM CURRENT USE OF INSULIN (MULTI): ICD-10-CM

## 2024-04-30 DIAGNOSIS — E78.49 OTHER HYPERLIPIDEMIA: ICD-10-CM

## 2024-04-30 DIAGNOSIS — I63.541: Primary | ICD-10-CM

## 2024-04-30 LAB
CREAT UR-MCNC: 57.9 MG/DL (ref 20–370)
MICROALBUMIN UR-MCNC: 34 MG/L
MICROALBUMIN/CREAT UR: 58.7 UG/MG CREAT

## 2024-04-30 PROCEDURE — G0156 HHCP-SVS OF AIDE,EA 15 MIN: HCPCS

## 2024-04-30 PROCEDURE — 99212 OFFICE O/P EST SF 10 MIN: CPT | Performed by: NEUROLOGICAL SURGERY

## 2024-04-30 PROCEDURE — 3078F DIAST BP <80 MM HG: CPT | Performed by: NEUROLOGICAL SURGERY

## 2024-04-30 PROCEDURE — 1036F TOBACCO NON-USER: CPT | Performed by: NEUROLOGICAL SURGERY

## 2024-04-30 PROCEDURE — G0158 HHC OT ASSISTANT EA 15: HCPCS | Mod: CO

## 2024-04-30 PROCEDURE — 3074F SYST BP LT 130 MM HG: CPT | Performed by: NEUROLOGICAL SURGERY

## 2024-04-30 PROCEDURE — 99202 OFFICE O/P NEW SF 15 MIN: CPT | Performed by: NEUROLOGICAL SURGERY

## 2024-04-30 PROCEDURE — 4010F ACE/ARB THERAPY RXD/TAKEN: CPT | Performed by: NEUROLOGICAL SURGERY

## 2024-04-30 RX ORDER — ASPIRIN 81 MG/1
81 TABLET ORAL DAILY
Qty: 90 TABLET | Refills: 0 | Status: SHIPPED | OUTPATIENT
Start: 2024-04-30

## 2024-04-30 RX ORDER — ASPIRIN 81 MG/1
81 TABLET ORAL DAILY
Qty: 5 TABLET | Refills: 5 | Status: SHIPPED | OUTPATIENT
Start: 2024-04-30 | End: 2024-04-30 | Stop reason: SDUPTHER

## 2024-04-30 RX ORDER — METFORMIN HYDROCHLORIDE 500 MG/1
500 TABLET, EXTENDED RELEASE ORAL
Qty: 90 TABLET | Refills: 0 | Status: SHIPPED | OUTPATIENT
Start: 2024-04-30

## 2024-04-30 RX ORDER — LISINOPRIL 20 MG/1
20 TABLET ORAL DAILY
Qty: 90 TABLET | Refills: 0 | Status: SHIPPED | OUTPATIENT
Start: 2024-04-30

## 2024-04-30 RX ORDER — INSULIN GLARGINE 100 [IU]/ML
10 INJECTION, SOLUTION SUBCUTANEOUS NIGHTLY
Qty: 3 ML | Refills: 2 | Status: SHIPPED | OUTPATIENT
Start: 2024-04-30 | End: 2024-05-07

## 2024-04-30 RX ORDER — ATORVASTATIN CALCIUM 40 MG/1
80 TABLET, FILM COATED ORAL DAILY
Qty: 180 TABLET | Refills: 0 | Status: SHIPPED | OUTPATIENT
Start: 2024-04-30

## 2024-04-30 ASSESSMENT — PAIN SCALES - GENERAL: PAINLEVEL: 0-NO PAIN

## 2024-04-30 NOTE — TELEPHONE ENCOUNTER
Pt called again to see if you would be able to assist with refilling her fathers prescriptions.  He is out of medications

## 2024-05-01 ENCOUNTER — APPOINTMENT (OUTPATIENT)
Dept: HOME HEALTH SERVICES | Facility: HOME HEALTH | Age: 64
End: 2024-05-01
Payer: COMMERCIAL

## 2024-05-01 ENCOUNTER — HOME CARE VISIT (OUTPATIENT)
Dept: HOME HEALTH SERVICES | Facility: HOME HEALTH | Age: 64
End: 2024-05-01
Payer: COMMERCIAL

## 2024-05-01 VITALS — HEART RATE: 65 BPM | DIASTOLIC BLOOD PRESSURE: 80 MMHG | SYSTOLIC BLOOD PRESSURE: 131 MMHG

## 2024-05-01 PROCEDURE — G0157 HHC PT ASSISTANT EA 15: HCPCS | Mod: CQ

## 2024-05-02 ENCOUNTER — HOME CARE VISIT (OUTPATIENT)
Dept: HOME HEALTH SERVICES | Facility: HOME HEALTH | Age: 64
End: 2024-05-02
Payer: COMMERCIAL

## 2024-05-02 ENCOUNTER — APPOINTMENT (OUTPATIENT)
Dept: PRIMARY CARE | Facility: CLINIC | Age: 64
End: 2024-05-02
Payer: COMMERCIAL

## 2024-05-02 VITALS
TEMPERATURE: 98.4 F | SYSTOLIC BLOOD PRESSURE: 92 MMHG | OXYGEN SATURATION: 94 % | HEART RATE: 62 BPM | DIASTOLIC BLOOD PRESSURE: 60 MMHG

## 2024-05-02 PROCEDURE — G0152 HHCP-SERV OF OT,EA 15 MIN: HCPCS

## 2024-05-02 PROCEDURE — G0156 HHCP-SVS OF AIDE,EA 15 MIN: HCPCS

## 2024-05-02 ASSESSMENT — ENCOUNTER SYMPTOMS
DENIES PAIN: 1
PERSON REPORTING PAIN: PATIENT

## 2024-05-04 ASSESSMENT — ACTIVITIES OF DAILY LIVING (ADL)
DRESSING_LB_CURRENT_FUNCTION: INDEPENDENT
DRESSING_UB_CURRENT_FUNCTION: INDEPENDENT
WASHING_UPB_CURRENT_FUNCTION: SUPERVISION
WASHING_LB_CURRENT_FUNCTION: SUPERVISION

## 2024-05-06 ENCOUNTER — HOME CARE VISIT (OUTPATIENT)
Dept: HOME HEALTH SERVICES | Facility: HOME HEALTH | Age: 64
End: 2024-05-06
Payer: COMMERCIAL

## 2024-05-06 VITALS — DIASTOLIC BLOOD PRESSURE: 79 MMHG | HEART RATE: 59 BPM | SYSTOLIC BLOOD PRESSURE: 112 MMHG

## 2024-05-06 PROCEDURE — G0157 HHC PT ASSISTANT EA 15: HCPCS | Mod: CQ

## 2024-05-06 PROCEDURE — G0153 HHCP-SVS OF S/L PATH,EA 15MN: HCPCS

## 2024-05-07 ENCOUNTER — HOME CARE VISIT (OUTPATIENT)
Dept: HOME HEALTH SERVICES | Facility: HOME HEALTH | Age: 64
End: 2024-05-07
Payer: COMMERCIAL

## 2024-05-07 PROCEDURE — G0158 HHC OT ASSISTANT EA 15: HCPCS | Mod: CO

## 2024-05-07 PROCEDURE — G0156 HHCP-SVS OF AIDE,EA 15 MIN: HCPCS

## 2024-05-07 RX ORDER — INSULIN GLARGINE 100 [IU]/ML
10 INJECTION, SOLUTION SUBCUTANEOUS EVERY 24 HOURS
Qty: 10 ML | Refills: 0 | Status: SHIPPED | OUTPATIENT
Start: 2024-05-07

## 2024-05-08 ENCOUNTER — HOME CARE VISIT (OUTPATIENT)
Dept: HOME HEALTH SERVICES | Facility: HOME HEALTH | Age: 64
End: 2024-05-08
Payer: COMMERCIAL

## 2024-05-08 VITALS — SYSTOLIC BLOOD PRESSURE: 114 MMHG | DIASTOLIC BLOOD PRESSURE: 68 MMHG

## 2024-05-08 PROCEDURE — G0157 HHC PT ASSISTANT EA 15: HCPCS | Mod: CQ

## 2024-05-09 ENCOUNTER — HOME CARE VISIT (OUTPATIENT)
Dept: HOME HEALTH SERVICES | Facility: HOME HEALTH | Age: 64
End: 2024-05-09
Payer: COMMERCIAL

## 2024-05-09 VITALS
TEMPERATURE: 98.2 F | RESPIRATION RATE: 16 BRPM | DIASTOLIC BLOOD PRESSURE: 81 MMHG | HEART RATE: 71 BPM | SYSTOLIC BLOOD PRESSURE: 118 MMHG

## 2024-05-09 PROCEDURE — G0158 HHC OT ASSISTANT EA 15: HCPCS | Mod: CO

## 2024-05-13 ENCOUNTER — HOME CARE VISIT (OUTPATIENT)
Dept: HOME HEALTH SERVICES | Facility: HOME HEALTH | Age: 64
End: 2024-05-13
Payer: COMMERCIAL

## 2024-05-13 PROCEDURE — G0157 HHC PT ASSISTANT EA 15: HCPCS | Mod: CQ

## 2024-05-13 PROCEDURE — G0153 HHCP-SVS OF S/L PATH,EA 15MN: HCPCS

## 2024-05-13 PROCEDURE — G0299 HHS/HOSPICE OF RN EA 15 MIN: HCPCS

## 2024-05-13 PROCEDURE — 0023 HH SOC

## 2024-05-13 NOTE — CASE COMMUNICATION
DISCHARGE SUMMARY:    DISCIPLINE: Speech Language Pathology  DATE OF DISCIPLINE DISCHARGE: 5/13/24  REASON FOR DISCHARGE: OUTPATIENT CARE  COORDINATION NOTE: Pt presents with improved cognition following skilled ST. Recommended that pt receive outpatient speech therapy to continue making progress.   EVALUATION OF GOALS: Adequate for discharge  SUMMARY OF CARE PROVIDED: cognitive linguistic training, voice strengthening exercises  DISCHA RGE INSTRUCTIONS GIVEN: Outpatient recommended, home program installed  SERVICES REMAINING: OT, SN, PT  NOMNC OBTAINED: no

## 2024-05-14 ENCOUNTER — HOME CARE VISIT (OUTPATIENT)
Dept: HOME HEALTH SERVICES | Facility: HOME HEALTH | Age: 64
End: 2024-05-14
Payer: COMMERCIAL

## 2024-05-14 VITALS
DIASTOLIC BLOOD PRESSURE: 62 MMHG | HEART RATE: 67 BPM | SYSTOLIC BLOOD PRESSURE: 126 MMHG | TEMPERATURE: 98.7 F | RESPIRATION RATE: 16 BRPM

## 2024-05-14 VITALS
TEMPERATURE: 97.7 F | SYSTOLIC BLOOD PRESSURE: 124 MMHG | DIASTOLIC BLOOD PRESSURE: 65 MMHG | HEART RATE: 62 BPM | OXYGEN SATURATION: 97 % | RESPIRATION RATE: 18 BRPM

## 2024-05-14 PROCEDURE — G0158 HHC OT ASSISTANT EA 15: HCPCS | Mod: CO

## 2024-05-14 ASSESSMENT — ACTIVITIES OF DAILY LIVING (ADL)
DRESSING_LB_CURRENT_FUNCTION: SUPERVISION
BATHING_CURRENT_FUNCTION: SUPERVISION
TOILETING: SUPERVISION
AMBULATION ASSISTANCE: SUPERVISION
TOILETING: 1
AMBULATION ASSISTANCE: 1
BATHING ASSESSED: 1
DRESSING_UB_CURRENT_FUNCTION: SUPERVISION

## 2024-05-14 ASSESSMENT — ENCOUNTER SYMPTOMS
APPETITE LEVEL: GOOD
FORGETFULNESS: 1
DENIES PAIN: 1

## 2024-05-15 ENCOUNTER — HOME CARE VISIT (OUTPATIENT)
Dept: HOME HEALTH SERVICES | Facility: HOME HEALTH | Age: 64
End: 2024-05-15
Payer: COMMERCIAL

## 2024-05-15 VITALS
TEMPERATURE: 98.3 F | SYSTOLIC BLOOD PRESSURE: 114 MMHG | DIASTOLIC BLOOD PRESSURE: 78 MMHG | HEART RATE: 64 BPM | OXYGEN SATURATION: 99 %

## 2024-05-15 PROCEDURE — G0152 HHCP-SERV OF OT,EA 15 MIN: HCPCS

## 2024-05-15 ASSESSMENT — ACTIVITIES OF DAILY LIVING (ADL)
HOME_HEALTH_OASIS: 00
OASIS_M1830: 01

## 2024-05-15 ASSESSMENT — ENCOUNTER SYMPTOMS
PERSON REPORTING PAIN: PATIENT
DENIES PAIN: 1

## 2024-05-20 ENCOUNTER — EVALUATION (OUTPATIENT)
Dept: PHYSICAL THERAPY | Facility: CLINIC | Age: 64
End: 2024-05-20
Payer: COMMERCIAL

## 2024-05-20 ENCOUNTER — EVALUATION (OUTPATIENT)
Dept: OCCUPATIONAL THERAPY | Facility: CLINIC | Age: 64
End: 2024-05-20
Payer: COMMERCIAL

## 2024-05-20 DIAGNOSIS — R53.1 WEAKNESS DUE TO ACUTE STROKE (MULTI): ICD-10-CM

## 2024-05-20 DIAGNOSIS — G81.94 LEFT HEMIPARESIS (MULTI): Primary | ICD-10-CM

## 2024-05-20 DIAGNOSIS — R26.89 IMPAIRMENT OF BALANCE: ICD-10-CM

## 2024-05-20 DIAGNOSIS — I63.9 WEAKNESS DUE TO ACUTE STROKE (MULTI): ICD-10-CM

## 2024-05-20 DIAGNOSIS — R26.9 ABNORMALITY OF GAIT AND MOBILITY: Primary | ICD-10-CM

## 2024-05-20 PROCEDURE — 97165 OT EVAL LOW COMPLEX 30 MIN: CPT | Mod: GO

## 2024-05-20 PROCEDURE — 97110 THERAPEUTIC EXERCISES: CPT | Mod: GO

## 2024-05-20 PROCEDURE — 97530 THERAPEUTIC ACTIVITIES: CPT | Mod: GP

## 2024-05-20 PROCEDURE — 97162 PT EVAL MOD COMPLEX 30 MIN: CPT | Mod: GP

## 2024-05-20 ASSESSMENT — ENCOUNTER SYMPTOMS
LOSS OF SENSATION IN FEET: 0
OCCASIONAL FEELINGS OF UNSTEADINESS: 1
DEPRESSION: 0
OCCASIONAL FEELINGS OF UNSTEADINESS: 1
DEPRESSION: 0
LOSS OF SENSATION IN FEET: 0

## 2024-05-20 ASSESSMENT — PAIN SCALES - GENERAL
PAINLEVEL_OUTOF10: 0 - NO PAIN
PAINLEVEL_OUTOF10: 0 - NO PAIN

## 2024-05-20 ASSESSMENT — PAIN - FUNCTIONAL ASSESSMENT
PAIN_FUNCTIONAL_ASSESSMENT: 0-10
PAIN_FUNCTIONAL_ASSESSMENT: 0-10

## 2024-05-20 NOTE — PROGRESS NOTES
Physical Therapy    Physical Therapy Evaluation and Treatment    Patient Name: Elijah Chase  MRN: [id number]  Today's Date: 5/20/2024  Time Calculation  Start Time: 0844  Stop Time: 0930  Time Calculation (min): 46 min  PT Evaluation Time Entry  PT Evaluation (Moderate) Time Entry: 38  PT Therapeutic Procedures Time Entry  Therapeutic Activity Time Entry: 8    Insurance: [facility] (30 vitis PT/OT)    Plan of Care: 5/20/24 - 8/18/24  Visit Number: 1       Assessment:  Elijah Chase  is a 63 y.o. old patient who participated in a physical therapy evaluation today due to history of acute stroke on 3/8/24. Patient presents with impaired posture, L side hemiparesis resulting in decreased L LE strength, impaired balance, and L sided motor control deficits.  Due to these impairments, he has the following functional limitations and participation restrictions: gait deviations, increased fall risk, stair negotiation, transfers, performing household/recreational activities, and performing some ADLS.  Skilled physical therapy services are appropriate and beneficial in order to achieve measurable and meaningful change in the objective tests and measures. Utilization of skilled physical therapy services will aid in advancing his functional status and attaining his therapy-related goals. The patient verbalized understanding and is in agreement with all goals and plan of care. Plan of care was developed with input and agreement by the patient    Plan:   OP PT Plan  Treatment/Interventions: Education/ Instruction, Gait training, Manual therapy, Neuromuscular re-education, Orthosis fit/ training, Prosthetic management/ training, Self care/ home management, Therapeutic activities, Therapeutic exercises  PT Plan: Skilled PT  PT Frequency: 2 times per week  Duration: 6-8 weeks  Onset Date: 03/08/24  Rehab Potential: Good  Plan of Care Agreement: Patient    Current Problem:  1. Abnormality of gait and mobility  Follow Up In Physical Therapy     "  2. Weakness due to acute stroke (Multi)  Referral to Physical Therapy    Follow Up In Physical Therapy      3. Impairment of balance  Follow Up In Physical Therapy        HPI per chart review:  \"Patient had a right sided stroke 3/8/24 at OSH in [place]. Patient went to inpatient rehabilitation at [facility] for 2 weeks and completed IPR on 3/31/24. They did not set patient up with PT or OT as they were not within the state. Has not been doing formal therapy since discharge from IPR. Deficits following stroke include left sided weakness, difficulties with swallowing, speech, cognition but symptoms have been improving.  Also having right sided flank pain. Also having difficulties with vision on left, possible neglect on the left side. Denies any spasticity.\"      SUBJECTIVE  Subjective   Elijah Chase  is a 63 y.o. male  presenting to the clinic with chief complaint of Acute Stroke 3/8/24. He c/o residual L sided weakness, impaired balance, walking, coordination.He c/o peripheral visional deficits.   Home PT ended about 1-2 week ago.     SCREENING:  -Hydration status: 6 - 16 oz bottles of water / day  -Sleep: 6 hrs / night  -Numbness/tingling: No  -Headache/dizziness: No    PREVIOUS LEVEL OF FUNCTION:  Independent Living, Independent ADLs/IADLs, Independent Ambulation, (+) Driving, (+) Work  CURRENT FUNCTIONAL LIMITATIONS: Mod Indep with ADLs, Walking with SPC, stating assist with IADLs  CURRENT DME:  Cane, Rollator      SOCIAL HISTORY/LIVING ARRANGEMENT:   Patient lives with daughter in a 2-story home with 1 JANNA + HR  Patient with first floor set-up for bedroom/bathroom    Patient ambulates with a FWW. Patient was completed independent prior to CVA. Does need help with dressing. Has rails for bathroom. He is currently living with his daughter and daughters , 1 JANNA, bed and bath on first floor. Living in [place]     PARTICIPATION RESTRICTIONS: leisure activities - shooting pool    EXERCISE/ACTIVITY LEVEL: " "sedentary but improving as daughter attempts to motivate him to perform activities with her    PATIENT GOAL: \"get stronger\"     General:  General  Reason for Referral: eval and treat s/p Stroke  Referred By: Priyanka Randle MD  Past Medical History Relevant to Rehab: HTN  Preferred Learning Style: verbal, visual, written  Payor: [facility] / Plan: [facility] / Product Type: *No Product type* /   Priyanka Holguin    Precautions  Precautions  STEADI Fall Risk Score (The score of 4 or more indicates an increased risk of falling): 6  Precautions Comment: Fall Risk       Pain  Pain Assessment: 0-10  Pain Score: 0 - No pain      Objective     MUSCULOSKELETAL SYSTEM:      GROSS SYMMETRY:  stand/sitting:  rounded shoulders, forward head      GROSS RANGE OF MOTION: WFL except        GROSS STRENGTH:  Impaired - tested in seated  Muscle  Right LE Left LE   Hip Flexion (L1-L2) 4/5 4-/5   Hip Abduction 4/5 4/5   Hip Adduction 4/5 4/5   Knee Flexion (L3) 4+/5 4-/5   Knee Extension (L4) 4+/5 3+/5   Ankle Dorsiflexion (L5) 5/5 3+/5       NEUROMUSCULAR SYSTEM:      OBSERVATION: L sided face droop     GROSS SENSATION: Intact   Tested L2-S1 Bilateral     TONE: None noted in L LE     COORDINATION:    CHIRAG:  hypometria L LE   HTS:   impaired L LE     PROPRIOCEPTION:  Intact R/L 3/3 at ankle     TRANSFERS:       SIT <> STAND:  Modified Independent requiring increased time      BALANCE:    SITTING:  Good   STANDING: Good  4 Stage Balance Test   Narrow Base of Support: 10 sec  Semi-Tandem: 10 sec  Tandem: 10 sec  SLS R: 8 sec with mild sway  SLS L:  10 sec with mild-mod sway        GAIT:  Patient ambulated 45 feet with SPC at Mod Indep. Patient demonstrated slow tyrone, decreased step length, slight L hip circumduction, decreased foot clearance with occasional shuffling L>R      STAIRS: up/down 3 steps with UL HR and SPC via non-reciprocal pattern        Outcome Measures:  FGA - " Functional Gait Assessment  Gait level surface: 2  Change in gait speed: 1  Gait with horizontal head turns: 2  Gait with vertical head turns: 2  Gait and pivot turn: 1  Step over obstacle: 1  Gait with narrow base of support: 2  Gait with eyes closed: 1  Ambulating backwards: 2  Steps: 1  FGA Total Score: 15  <23/30 = fall risk in older adults  age 60-69 average score 27.1  age 70-79 average score 24.9  age 80-89 average score 20.8      Other Measures  5x Sit to Stand: 18.25 sec  10M Walk Test: 13.49 sec with SPC  Activities - Specific Balance Confidence Scale: 74%  Other Outcome Measures: TUG: 15.07 sec with SPC    FIVE TIMES SIT TO STAND  Age Average Time  60-69 11.4 sec  70-79 12.6 sec  80-89 14.8 sec  Fall Risk  Geriatrics >/= 12 sec  CVA          >/= 12 sec      GAIT SPEED =  0.74 m/s  Fall Risk = walking speed less than or equal to 1.0 m/s  1.3-1.5 m/sac healthy young adult  0.9-1.2 m/sec older adult  0 to.4 m/s = household  .4 to .8 m/s = limited community  .8 to 1.2 m/s = community  1.2 to 1.4 m/s = normal and can cross street  MDC= 0.1 m/s, with improvements of 0.2 m/s linked to improved health  outcomes, fewer hospitalizations and fewer physician visits      Treatments:  THERAPEUTIC ACTIVITY x 8 minutes  Educated patient on purpose and benefits of neurologic physical therapy   Educated patient on purpose of exercises and importance of regular daily       home program compliance  Reviewed home exercise program with patient and daughter  Educated patient on importance of nutrition and hydration and the recommended daily amount being half of one's body weight in ounces  Educated on the role of inactivity in decreased endurance and decreased cardiovascular fitness  Educated patient on evidenced based research for stroke rehab being that it is recommended for at least 4,000 steps per day for aerobic conditioning and to improve gait; pt and daughter expressed understanding  Provided pt with walking program to  assist in transition of  daily walking with SPC  Advised patient to use SPC for all mobility as he is at an increased risk of falling  Provided patient with visual, verbal, and written instruction via printed home program handout to ensure carryover  Educated patient on proper stair negotiation: up with strong (R) and down with weak (L); pt with good return demonstration of technique up/down 3 steps x 2 with UL HR and cane  Educated patient on 1 step negotiation with only SPC using above technique as daughter's home has 1 JANNA without HR; to allow for safe entry and exit of home.      EDUCATION:  Access Code: [email or web address]  URL: [email or web address]  Date: 05/20/2024  Prepared by: Katt Banks    Exercises  - Sit to Stand  - 1 x daily - 7 x weekly - 3 sets - 10 reps  - Standing Hip Abduction with Counter Support  - 1 x daily - 7 x weekly - 3 sets - 10 reps  - Standing Hip Extension with Counter Support  - 1 x daily - 7 x weekly - 3 sets - 10 reps  - Standing March with Counter Support  - 1 x daily - 7 x weekly - 3 sets - 10 reps  - Heel Toe Raises with Counter Support  - 1 x daily - 7 x weekly - 3 sets - 10 reps      Goals:  Active       PT Problem       PT Goal 1       Start:  05/20/24    Expected End:  08/18/24       Elijah Chase will complete TUG within 12 seconds or less (indicative of higher fall risk) in order to increase balance and enhance safety during ADLs/ IADLs.          PT Goal 2       Start:  05/20/24    Expected End:  08/18/24       Patient will perform 5xSTS test in </= 11 sec to demonstrate improved LE strength and decrease risk of falls         PT Goal 3       Start:  05/20/24    Expected End:  08/18/24       Patient will score >/= 2/30 on FGA to meet MDC for stroke population and demonstrate improvement in balance and functional mobility         PT Goal 4       Start:  05/20/24    Expected End:  08/18/24       Elijah Chase  will increase the 10MWT score by >/=0.16m/s (MCID) and > 0.8 m/s  which is predictive of community ambulation post stroke.   Gait speed on the 10MWT in regards to ambulation classification: </= 0.4 m/s household ambulator; 0.4 m/s-0.8 m/s limited community ambulation; > 0.8 m/s community ambulator)          PT Goal 5       Start:  05/20/24    Expected End:  08/18/24       Elijah Chase will demonstrate independence and report compliance with HEP to facilitate independent rehab program upon discharge.         Patient Stated Goal 1       Start:  05/20/24    Expected End:  08/18/24       Get stronger

## 2024-05-20 NOTE — PROGRESS NOTES
"Occupational Therapy    Occupational Therapy Evaluation    Name: Elijah Chase \"Gene\"  MRN: [id number]  : 1960  Date: 24  Time Calculation  Start Time: 930  Stop Time: 1015  Time Calculation (min): 45 min  OT Evaluation Time Entry  OT Evaluation (Low) Time Entry: 30  OT Therapeutic Procedures Time Entry  Therapeutic Exercise Time Entry: 15                  Visit: 1  [facility]   24-24  30 OT/PT combined   $50 copay  Assessment:   Patient is a 63 year old male s/p CVA 3/8/24. Patient presents with LUE hemiparesis impacting patient's abilities to carry out every day tasks.   Patient would benefit from skilled OT to address the above impairments to maximize patient's functional abilities.   Plan:   OT POC to include: neuro re-ed, manual therapy, therapeutic exercise, therapeutic activity, HEP   1-2x  a week for 5-6 weeks  Rehab potential: Good  Patient in agreement with plan     Subjective  Patient agreeable to OT evaluation. Accompanied by daughter. Patient reports is currently living with daughter. Reports competing ADLs independently. Patient uses a shower chair and hand held shower.  Daughter assists with medication management.   Current Problem:  1. Left hemiparesis (Multi)  Follow Up In Occupational Therapy      2. Weakness due to acute stroke (Multi)  Referral to Occupational Therapy    Follow Up In Occupational Therapy        General:      General  Reason for Referral: OT eval and treat  Referred By:  (Priyanka Randle,)  Per Dr. Randle's note on 24:  HPI  Mr Elijah Chase, a 63 y.o. year old male with pmhx of HTN and DM here with his daughter and brother here for evaluation of stroke. Patient had a right sided stroke 3/8/24 at OSH in [place]. Patient went to inpatient rehabilitation at [facility] for 2 weeks and completed IPR on 3/31/24. They did not set patient up with PT or OT as they were not within the state. Has not been doing formal therapy since discharge from [facility]. " "Deficits following stroke include left sided weakness, difficulties with swallowing, speech, cognition but symptoms have been improving. Reports dysuria, as well as difficulties with urinary urgency, incontinence, foul smelling urine. No urinary issues prior to CVA. Currently urinating with bedside urinal. Also having right sided flank pain. Also having difficulties with vision on left, possible neglect on the left side. Denies any spasticity.     Precautions:  Precautions  STEADI Fall Risk Score (The score of 4 or more indicates an increased risk of falling): 6  Vital Signs:   Not taken  Pain Assessment:  Pain Assessment  Pain Assessment: 0-10  Pain Score: 0 - No pain  Work History:  Not working due to injury  Prior Level of Function:  Independent  Roles/Routines:  Patient likes to shoot pool   Patient Goal:  \"To get my left side back together\"  Personal Factors that my impact Care:   Not driving ; possible decreased insight into deficits  Objective   Neuro:  Observation:  R handed  Walks cane  Reports difficulty with seeing out of L peripheral   ROM:  L shoulder flexion: ~125 degrees  Strength:  RUE: WFL   R : 65#  LUE: elevation: 3/5, shoulder ->distally grossly 3+/5  L : 35#  Coordination:  Able to complete digit opposition with extra time, Difficulty with finger to nose with eyes closed with the LUE  Sensation:  Denies parathesias in the LUE  Tone:  No tone noted  Tremor   No tremor noted   Outcome Measures:  OT Adult Other Outcome Measures  9 Hole Peg Test:  (R: 26 seconds  L: 40 seconds)    OP EDUCATION:/Treatment:   Patient educated on and completed green putty exercises to the L hand.    Goals:  Active       OT Goals       HEP       Start:  05/20/24    Expected End:  07/29/24       Will establish a LUE HEP which will include stretching, ROM, strengthening and coordination.         Strength       Start:  05/20/24    Expected End:  07/29/24       Patient will increase LUE strength by 1/2 muscle grade " and L  strength by 5-10# for ADL/IADL tasks         Coordination       Start:  05/20/24    Expected End:  07/29/24       Patient will demonstrate an increase in L hand coordination as evident by completing 9 hole peg test in 35 seconds or less.

## 2024-05-22 ENCOUNTER — EVALUATION (OUTPATIENT)
Dept: SPEECH THERAPY | Facility: CLINIC | Age: 64
End: 2024-05-22
Payer: COMMERCIAL

## 2024-05-22 DIAGNOSIS — I69.319 COGNITIVE DEFICIT STATUS POST CEREBROVASCULAR ACCIDENT: ICD-10-CM

## 2024-05-22 DIAGNOSIS — I69.328 SPEECH OR LANGUAGE DEFICIT, POST-STROKE: ICD-10-CM

## 2024-05-22 DIAGNOSIS — I69.322 DYSARTHRIA FOLLOWING CEREBROVASCULAR ACCIDENT: ICD-10-CM

## 2024-05-22 PROCEDURE — 92523 SPEECH SOUND LANG COMPREHEN: CPT | Mod: GN

## 2024-05-22 ASSESSMENT — PAIN - FUNCTIONAL ASSESSMENT: PAIN_FUNCTIONAL_ASSESSMENT: 0-10

## 2024-05-22 ASSESSMENT — PAIN SCALES - GENERAL: PAINLEVEL_OUTOF10: 0 - NO PAIN

## 2024-05-22 NOTE — PROGRESS NOTES
"Speech-Language Pathology    SLP Adult Outpatient Speech-Language Cognitive Evaluation    Patient Name: Elijah Chase \"Gene\"  MRN: [id number]  Today's Date: 2024   Time Calculation  Start Time: 1100  Stop Time: 1200  Time Calculation (min): 60 min      Current Problem:   1. Dysarthria following cerebrovascular accident  Follow Up In Speech Therapy      2. Cognitive deficit status post cerebrovascular accident  Follow Up In Speech Therapy      3. Speech or language deficit, post-stroke  Referral to Speech Therapy          SLP Assessment:  SLP Assessment Results:    This date, patient presented with motor speech and cognitive-communication deficits s/p CVA 3/2024. He also presented with a significant hoarse/raspy vocal quality for which he was referred to ENT (information for scheduling provided). Patient is recommended for outpatient Speech Therapy services targeting motor speech and cognitive-linguistic rehabilitation, compensatory strategy/tool training, and establishment of a home program. Patient was in agreement with recommendations and goals/POC.    Prognosis: Good  Treatment Provided: No  Strengths: Motivation, family support        SLP Plan:  Inpatient/Swing Bed or Outpatient: Outpatient  SLP Plan: Skilled SLP  SLP Frequency: 1-2x/week  Duration: 8 weeks  Discussed POC: Patient  Discussed Risks/Benefits: Yes  Patient/Caregiver Agreeable: Yes    GOALS: Start date: 2024; End/re-eval date: 2024  By discharge:  LT. Patient will independently and effectively communicate in valued conversations in the home and social settings using compensatory strategies as needed.  2. Patient will independently utilize compensatory strategies/tools for cognition in order to improve effectiveness of IADL management in the home, work, and community settings.     STGs:   1. Patient will utilize learned intelligibility enhancing strategies (e.g. reduced rate, over-articulation, adequate volume) during reading " activities with >= 95% accuracy with minimal verbal reminders.  2. Patient will utilize learned intelligibility enhancing strategies (e.g. reduced rate, over-articulation, adequate volume) during structured, spontaneous speech production activities with >= 90% accuracy with minimal verbal reminders.  3. Patient will independently utilize compensatory strategies/tools for memory, attention, and executive functioning with 90% accuracy in order to improve effectiveness of IADL management in the home, work, and community settings.         General Visit Information:  Reason for Referral:    Patient was referred for outpatient Speech Therapy evaluation 2/2 motor speech, voice, and cognitive-linguistic deficits s/p CVA 3/2024. Patient attended today's assessment independently and provided report regarding recent medical history. Patient recently completed [facility] on 5/13/2024. He reported the following, persistent deficits:    > VOICE: Low volume; hoarse vocal quality (reportedly baseline)  > MOTOR SPEECH: Slurred speech; asked to repeat often; mild L facial droop; normal sensation  > COGNITION: STM (sometimes gets days mixed up, what is coming up, what day it is, etc.); no issues with attention (multitasking included); cleaning (independent still), laundry (independent still), paying bills (auto pay before and now), medication management (Metformin only before; daughter checks his pill organizer after he finishes with it), schedule/appointment management (daughter scheduled his appointments for rehab; has print out of appointments currently); cooking/grilling/grocery shopping (recently grilled for daughter's graduation even though family didn't want him to), driving (not currently)  > SWALLOWING: Oral stage dysphagia; pocketing of solids in L lateral sulci    Patient denied deficits of receptive and expressive language.    *Social Hx: , lives with daughter (since CVA); [employer] (5 days/week, 3 days/week 12  hours/shift)    Referred By: Dr. Elliot Duran  Past Medical History Relevant to Rehab: CVA, L hemiparesis, reactive depression, HTN, HLD, DM2  Number of Authorized Treatments : **Pending** ([facility])  Total Number of Visits : 1        Objective     Pain:  Pain Assessment: 0-10  Pain Score: 0 -  No pain      Oral Mechanism Exam / Cranial Nerve Exam:  Trigeminal Nerve (V)     Jaw ROM: Reduced left  Facial Nerve (VII)     Asymmetry at rest: Left sided facial droop     Lip pucker: WFL     Smile: Reduced left     Lip/smile alternation: Reduced left  Glossopharyngeal, Vagus (IX, X)     Uvula at rest: WFL     Palate at rest: WFL     Palatal elevation /a/: WFL  Hypoglossal (XII)     Tongue at rest: WFL     Tongue protrusion: WFL     Side to side: Reduced left     Tongue elevation: WFL  Diadochokinetic Rates (per 5 seconds) [norms based on males <= 65 y.o.]:  --puh: 5.9/sec [norm: 6.5/sec]; reduced articulatory precision/slurred; poor syllable boundaries  --tuh: 5.6/sec [norm: 6.5/sec]; reduced articulatory precision/slurred; poor syllable boundaries  --kuh: 5.7/sec [norm: 6.1/sec]; reduced articulatory precision/slurred; poor syllable boundaries  --puhtuhkuh: 5.3/sec [norm: 7.5/sec]; reduced articulatory precision/slurred; poor syllable boundaries; intermittently incorrect syllable sequencing with increased rate      Other:  --Speech Sample ([place]): Notably hoarse/raspy vocal quality; reduced articulatory precision; poor word boundaries; periodic drop of final consonants; weak syllable deletion  --Speech Sample (Spontaneous): Notably hoarse/raspy vocal quality; reduced articulatory precision; poor word boundaries; periodic drop of final consonants; weak syllable deletion      Shepherd Cognitive Assessment (MOCA):  -- Visuospatial / Executive: 4/5  -- Naming: 3/3  -- Memory (Immediate; unscored): 3/5, 4/5  -- Attention: 5/6  -- Language: 2/3  -- Abstraction: 0/2  -- Memory (Delayed, 5 minutes): 3/5 indep, 5/5 categ  "cues     > Memory Index Score: 13/15  -- Orientation: 6/6  ** TOTAL (\"normal:\" >26/30): 23/30        Outpatient Education:  Individual(s) Educated: Patient  Verbal Education : Results of evaluation; treatment recommendations  Risk and Benefits Discussed with Patient/Caregiver/Other: yes  Patient/Caregiver Demonstrated Understanding: yes  Plan of Care Discussed and Agreed Upon: yes  Patient Response to Education: Patient/Caregiver Verbalized Understanding of Information, Patient/Caregiver Asked Appropriate Questions  "

## 2024-05-23 ASSESSMENT — PATIENT HEALTH QUESTIONNAIRE - PHQ9
1. LITTLE INTEREST OR PLEASURE IN DOING THINGS: NOT AT ALL
2. FEELING DOWN, DEPRESSED OR HOPELESS: NOT AT ALL
SUM OF ALL RESPONSES TO PHQ9 QUESTIONS 1 AND 2: 0

## 2024-05-24 ENCOUNTER — TREATMENT (OUTPATIENT)
Dept: OCCUPATIONAL THERAPY | Facility: CLINIC | Age: 64
End: 2024-05-24
Payer: COMMERCIAL

## 2024-05-24 DIAGNOSIS — I63.9 WEAKNESS DUE TO ACUTE STROKE (MULTI): ICD-10-CM

## 2024-05-24 DIAGNOSIS — R53.1 WEAKNESS DUE TO ACUTE STROKE (MULTI): ICD-10-CM

## 2024-05-24 DIAGNOSIS — G81.94 LEFT HEMIPARESIS (MULTI): Primary | ICD-10-CM

## 2024-05-24 PROCEDURE — 97110 THERAPEUTIC EXERCISES: CPT | Mod: GO

## 2024-05-24 ASSESSMENT — PAIN - FUNCTIONAL ASSESSMENT: PAIN_FUNCTIONAL_ASSESSMENT: 0-10

## 2024-05-24 ASSESSMENT — PAIN SCALES - GENERAL: PAINLEVEL_OUTOF10: 0 - NO PAIN

## 2024-05-24 NOTE — PROGRESS NOTES
"Occupational Therapy    Occupational Therapy Treatment    Name: Elijah Chase \"Gene\"  MRN: [id number]  : 1960  Date: 24  Time Calculation  Start Time: 0850  Stop Time: 930  Time Calculation (min): 40 min     OT Therapeutic Procedures Time Entry  Therapeutic Exercise Time Entry: 40                  Visit: 2  [facility]   24-24  30 OT/PT combined   Primary dx: L hemiparesis   Assessment:   Patient responded well to treatment session, demonstrating good understanding and carryover of home exercises.  Plan:   Continue with skilled OT POC with focus on increasing LUE function. Will introduce theraband exercises next session.    Subjective   Patient agreeable to treatment session. No new issues to report.    Pain Assessment:  Pain Assessment  Pain Assessment: 0-10  Pain Score: 0 - No pain  Precautions:  Fall risk    Objective      Therapeutic Exercise: 40 mins  Patient completed 2 sets of 10 of the following cane exercises in supine to promote LUE ROM and control: shoulder flexion, shoulder protraction, external rotation. Cues given for proper technique     Completed red putty exercises to the L hand for strengthening: digit flexion, digit extension, digit adduction, tip to tip pinch, lateral pinch, digit abduction. Cues given for proper technique.      OP EDUCATION:   Given handouts for cane exercises and putty exercises. Also given red putty    Goals:  Active       OT Goals       HEP       Start:  24    Expected End:  24       Will establish a LUE HEP which will include stretching, ROM, strengthening and coordination.         Strength       Start:  24    Expected End:  24       Patient will increase LUE strength by 1/2 muscle grade and L  strength by 5-10# for ADL/IADL tasks         Coordination       Start:  24    Expected End:  24       Patient will demonstrate an increase in L hand coordination as evident by completing 9 hole peg test in 35 seconds or less.       "

## 2024-05-28 ENCOUNTER — TREATMENT (OUTPATIENT)
Dept: OCCUPATIONAL THERAPY | Facility: CLINIC | Age: 64
End: 2024-05-28
Payer: COMMERCIAL

## 2024-05-28 DIAGNOSIS — I63.9 WEAKNESS DUE TO ACUTE STROKE (MULTI): ICD-10-CM

## 2024-05-28 DIAGNOSIS — R53.1 WEAKNESS DUE TO ACUTE STROKE (MULTI): ICD-10-CM

## 2024-05-28 DIAGNOSIS — G81.94 LEFT HEMIPARESIS (MULTI): Primary | ICD-10-CM

## 2024-05-28 PROCEDURE — 97110 THERAPEUTIC EXERCISES: CPT | Mod: GO

## 2024-05-28 ASSESSMENT — PAIN SCALES - GENERAL: PAINLEVEL_OUTOF10: 0 - NO PAIN

## 2024-05-28 ASSESSMENT — PAIN - FUNCTIONAL ASSESSMENT: PAIN_FUNCTIONAL_ASSESSMENT: 0-10

## 2024-05-28 NOTE — PROGRESS NOTES
"Occupational Therapy    Occupational Therapy Treatment    Name: Elijah Chase \"Gene\"  MRN: [id number]  : 1960  Date: 24  Time Calculation  Start Time: 840  Stop Time: 925  Time Calculation (min): 45 min     OT Therapeutic Procedures Time Entry  Therapeutic Exercise Time Entry: 40                  Visit: 3  [facility]   24-24  30 OT/PT combined   Primary dx: L hemiparesis   Assessment:   Patient responded well to treatment session, patient required verbal and tactile cues to complete exercises with good form.  Plan:   Continue with skilled OT POC with focus on increasing LUE function.     Subjective   Patient agreeable to treatment session. \"I did do some of my exercises this weekend.\"    Pain Assessment:  Pain Assessment  Pain Assessment: 0-10  Pain Score: 0 - No pain  Precautions:  Fall risk    Objective      Therapeutic Exercise: 40 mins  -Completed AROM scapular retraction 15x, elevation/depression 15x  -Towel slides on table top 15x shoulder flexion  -Patient completed green theraband exercises, 3 sets of 10: shoulder extension, rows, external rotation, elbow flexion, elbow extension, wrist flexion, wrist extension    OP EDUCATION:   Given handouts for theraband exercises, and given green  band.    Goals:  Active       OT Goals       HEP       Start:  24    Expected End:  24       Will establish a LUE HEP which will include stretching, ROM, strengthening and coordination.         Strength       Start:  24    Expected End:  24       Patient will increase LUE strength by 1/2 muscle grade and L  strength by 5-10# for ADL/IADL tasks         Coordination       Start:  24    Expected End:  24       Patient will demonstrate an increase in L hand coordination as evident by completing 9 hole peg test in 35 seconds or less.                     "

## 2024-05-29 ENCOUNTER — OFFICE VISIT (OUTPATIENT)
Dept: UROLOGY | Facility: HOSPITAL | Age: 64
End: 2024-05-29
Payer: COMMERCIAL

## 2024-05-29 DIAGNOSIS — N39.0 URINARY TRACT INFECTION WITHOUT HEMATURIA, SITE UNSPECIFIED: Primary | ICD-10-CM

## 2024-05-29 LAB
POC APPEARANCE, URINE: CLEAR
POC BILIRUBIN, URINE: NEGATIVE
POC BLOOD, URINE: NEGATIVE
POC COLOR, URINE: YELLOW
POC GLUCOSE, URINE: NEGATIVE MG/DL
POC KETONES, URINE: NEGATIVE MG/DL
POC LEUKOCYTES, URINE: NEGATIVE
POC NITRITE,URINE: NEGATIVE
POC PH, URINE: 5.5 PH
POC PROTEIN, URINE: NEGATIVE MG/DL
POC SPECIFIC GRAVITY, URINE: >=1.03
POC UROBILINOGEN, URINE: 0.2 EU/DL

## 2024-05-29 PROCEDURE — 1036F TOBACCO NON-USER: CPT | Performed by: STUDENT IN AN ORGANIZED HEALTH CARE EDUCATION/TRAINING PROGRAM

## 2024-05-29 PROCEDURE — 99204 OFFICE O/P NEW MOD 45 MIN: CPT | Performed by: STUDENT IN AN ORGANIZED HEALTH CARE EDUCATION/TRAINING PROGRAM

## 2024-05-29 PROCEDURE — 99214 OFFICE O/P EST MOD 30 MIN: CPT | Performed by: STUDENT IN AN ORGANIZED HEALTH CARE EDUCATION/TRAINING PROGRAM

## 2024-05-29 PROCEDURE — 81003 URINALYSIS AUTO W/O SCOPE: CPT | Performed by: STUDENT IN AN ORGANIZED HEALTH CARE EDUCATION/TRAINING PROGRAM

## 2024-05-29 PROCEDURE — 3060F POS MICROALBUMINURIA REV: CPT | Performed by: STUDENT IN AN ORGANIZED HEALTH CARE EDUCATION/TRAINING PROGRAM

## 2024-05-29 PROCEDURE — 4010F ACE/ARB THERAPY RXD/TAKEN: CPT | Performed by: STUDENT IN AN ORGANIZED HEALTH CARE EDUCATION/TRAINING PROGRAM

## 2024-05-29 RX ORDER — TAMSULOSIN HYDROCHLORIDE 0.4 MG/1
0.4 CAPSULE ORAL DAILY
Qty: 30 CAPSULE | Refills: 11 | Status: SHIPPED | OUTPATIENT
Start: 2024-05-29 | End: 2025-05-29

## 2024-05-29 ASSESSMENT — PAIN SCALES - GENERAL: PAINLEVEL: 0-NO PAIN

## 2024-05-29 NOTE — PROGRESS NOTES
"Subjective   Patient ID: Elijah Chase \"Gene\" is a 63 y.o. male    HPI  63 y.o. male who presents with recurrent urinary tract infections (UTIs) and concerns about urinary retention. He has had three UTIs in the past month and a half, which started after a recent stroke. The patient was treated with Bactrim for a resistant UTI. He reports difficulty urinating and incomplete bladder emptying. The patient has not previously tried Flomax. He is advised to stay active, perform physical therapy, and avoid prolonged sitting or lying down. The patient is also advised to switch to decaf green tea and to empty his bladder frequently, even without the urge.      Review of Systems    All systems were reviewed. Anything negative was noted in the HPI.    Objective   Physical Exam    General: Well developed, well nourished, alert and cooperative, appears in no acute distress   Eyes: Non-injected conjunctiva, sclera clear, no proptosis   Cardiac: Extremities are warm and well perfused. No edema, cyanosis or pallor   Lungs: Breathing is easy, non-labored. Speaking in clear and complete sentences. Normal diaphragmatic movement   MSK: Ambulatory with steady gait, unassisted   Neuro: Alert and oriented to person, place, and time   Psych: Demonstrates good judgment and reason, without hallucinations, abnormal affect or abnormal behaviors   Skin: No obvious lesions, no rashes       No CVA tenderness bilaterally   No suprapubic pain or discomfort       No past medical history on file.      No past surgical history on file.        Assessment/Plan   Urinary Tract Infection (UTI),  Benign Prostatic Hyperplasia (BPH)    63 y.o. male who presents for the above condition, We had a very long and extensive discussion with the patient regarding the pathophysiology, differential diagnosis, risk factor, management, natural history, incidence and diagnostic work-up of the condition.     Today, we had a very long and extensive discussion with the " patient regarding the pathophysiology, differential diagnosis, risk factor, associated condition, diagnostic work-up and management of BPH and lower urinary tract symptoms and acute urinary retention. I discussed with the patient the need to check his PSA to assess his prostate cancer risk. We discussed at length the mechanism of action, risk, benefit, adverse events and side effect of alpha-blocker in the form of tamsulosin 0.4 mg p.o nightly. We discussed in particular the risk of hypotension, lightheadedness, dizziness, and the risk of fall and bone fracture. Also discussed retrograde ejaculation of the side effects of the medication. We had another discussion with the patient regarding lifestyle modifications including low fluid intake after 5 PM, timed voiding every 2 hours, and decrease caffeine intake. I discussed with the patient that in case he had an elevated PSA, we will proceed do an MRI of the prostate.       Plan:  - Flomax 0.4 mg/day  - Renal US  - Follow up in 1 months with cystoscopy        5/29/2024    Tyrell Attestation  By signing my name below, I, Tyrell Nava   attest that this documentation has been prepared under the direction and in the presence of Dr. Jackson Villareal

## 2024-05-31 ENCOUNTER — TREATMENT (OUTPATIENT)
Dept: OCCUPATIONAL THERAPY | Facility: CLINIC | Age: 64
End: 2024-05-31
Payer: COMMERCIAL

## 2024-05-31 DIAGNOSIS — I63.9 WEAKNESS DUE TO ACUTE STROKE (MULTI): ICD-10-CM

## 2024-05-31 DIAGNOSIS — R53.1 WEAKNESS DUE TO ACUTE STROKE (MULTI): ICD-10-CM

## 2024-05-31 DIAGNOSIS — G81.94 LEFT HEMIPARESIS (MULTI): Primary | ICD-10-CM

## 2024-05-31 PROCEDURE — 97112 NEUROMUSCULAR REEDUCATION: CPT | Mod: GO

## 2024-05-31 ASSESSMENT — PAIN SCALES - GENERAL: PAINLEVEL_OUTOF10: 0 - NO PAIN

## 2024-05-31 ASSESSMENT — PAIN - FUNCTIONAL ASSESSMENT: PAIN_FUNCTIONAL_ASSESSMENT: 0-10

## 2024-05-31 NOTE — PROGRESS NOTES
"Occupational Therapy    Occupational Therapy Treatment    Name: Elijah Chase \"Gene\"  MRN: [id number]  : 1960  Date: 24  Time Calculation  Start Time: 845  Stop Time: 930  Time Calculation (min): 45 min     OT Therapeutic Procedures Time Entry  Neuromuscular Re-Education Time Entry: 40                  Visit: 4  [facility]   24-24  30 OT/PT combined   Primary dx: L hemiparesis   Assessment:   Patient responded well to treatment session, making steady progress towards functional goals.   Plan:   Continue with skilled OT POC with focus on increasing LUE function.     Subjective   Patient agreeable to treatment session. No new issues to report.   Pain Assessment:  Pain Assessment  Pain Assessment: 0-10  Pain Score: 0 - No pain  Precautions:  Fall risk    Objective      Neuro Re-ed: 40 mins  Completed closed chained movement for the LUE by completing [employer] bike with BUE's only. Completed 5 minutes forwards and 5 minutes backwards on level 1.0, RPM 49.    Completed the following activities to promote hand-eye coordination, reaction time, and multi-tasking for the LUE:   -Bouncing a small racket ball. Completed in sitting, bouncing with just the L hand, then L<>R hand. Patient then stood to complete same activity, and then with walking. Required occasional verbal cues to slow down.  -Walking around therapy gym balancing a racket ball on a tray table with BUE's. Completed with CGA for safety. Cues given to go slow and  the L foot. Dropped ball off tray 1x.    OP EDUCATION:   Continue with home program    Goals:  Active       OT Goals       HEP       Start:  24    Expected End:  24       Will establish a LUE HEP which will include stretching, ROM, strengthening and coordination.         Strength       Start:  24    Expected End:  24       Patient will increase LUE strength by 1/2 muscle grade and L  strength by 5-10# for ADL/IADL tasks         Coordination       Start:  " 05/20/24    Expected End:  07/29/24       Patient will demonstrate an increase in L hand coordination as evident by completing 9 hole peg test in 35 seconds or less.

## 2024-06-03 ENCOUNTER — TREATMENT (OUTPATIENT)
Dept: PHYSICAL THERAPY | Facility: CLINIC | Age: 64
End: 2024-06-03
Payer: COMMERCIAL

## 2024-06-03 ENCOUNTER — TREATMENT (OUTPATIENT)
Dept: OCCUPATIONAL THERAPY | Facility: CLINIC | Age: 64
End: 2024-06-03
Payer: COMMERCIAL

## 2024-06-03 DIAGNOSIS — I63.9 WEAKNESS DUE TO ACUTE STROKE (MULTI): ICD-10-CM

## 2024-06-03 DIAGNOSIS — R26.89 IMPAIRMENT OF BALANCE: ICD-10-CM

## 2024-06-03 DIAGNOSIS — R53.1 WEAKNESS DUE TO ACUTE STROKE (MULTI): ICD-10-CM

## 2024-06-03 DIAGNOSIS — R26.9 ABNORMALITY OF GAIT AND MOBILITY: ICD-10-CM

## 2024-06-03 DIAGNOSIS — G81.94 LEFT HEMIPARESIS (MULTI): Primary | ICD-10-CM

## 2024-06-03 PROCEDURE — 97112 NEUROMUSCULAR REEDUCATION: CPT | Mod: GO

## 2024-06-03 PROCEDURE — 97112 NEUROMUSCULAR REEDUCATION: CPT | Mod: GP | Performed by: PHYSICAL THERAPIST

## 2024-06-03 PROCEDURE — 97116 GAIT TRAINING THERAPY: CPT | Mod: GP | Performed by: PHYSICAL THERAPIST

## 2024-06-03 ASSESSMENT — PAIN - FUNCTIONAL ASSESSMENT: PAIN_FUNCTIONAL_ASSESSMENT: 0-10

## 2024-06-03 ASSESSMENT — PAIN SCALES - GENERAL: PAINLEVEL_OUTOF10: 0 - NO PAIN

## 2024-06-03 NOTE — PROGRESS NOTES
"Occupational Therapy    Occupational Therapy Treatment    Name: Elijah Chase \"Gene\"  MRN: [id number]  : 1960  Date: 24  Time Calculation  Start Time: 400  Stop Time: 440  Time Calculation (min): 40 min     OT Therapeutic Procedures Time Entry  Neuromuscular Re-Education Time Entry: 40                  Visit: 5  [facility]   24-24  30 OT/PT combined   Primary dx: L hemiparesis   Assessment:   Patient responded well to treatment session, making steady progress towards functional goals.   Plan:   Continue with skilled OT POC with focus on increasing LUE function.     Subjective   Patient agreeable to treatment session. No new issues to report.   Pain Assessment:  Pain Assessment  Pain Assessment: 0-10  Pain Score: 0 - No pain  Precautions:  Fall risk    Objective      Neuro Re-ed: 40 mins  Completed closed chained movement for the LUE by completing [employer] bike with BUE's only. Completed 5 minutes forwards and 5 minutes backwards on level 2.0, RPM 49. Patient was instructed to name items in categories ex. Fruits and vegetables.     Completed the following to increase L hand fine motor control:  With pennies, completed:  -sliding pennies down table alternating tip to tip pinch to  to place into container.   -finger to palm translation of 1-18 pennies  -palm to finger translation of 1-9 pennies at a time  -Completed picking up small pegs to place into pegboard with L hand. Patient was then instructed to take out pegs using tweezers in the L hand.   With cues for positioning of tweezers, patient was able to  a few pegs using the tweezers in the L hand. Completed with extra time.    OP EDUCATION:   Continue with home program    Goals:  Active       OT Goals       HEP       Start:  24    Expected End:  24       Will establish a LUE HEP which will include stretching, ROM, strengthening and coordination.         Strength       Start:  24    Expected End:  24       Patient " will increase LUE strength by 1/2 muscle grade and L  strength by 5-10# for ADL/IADL tasks         Coordination       Start:  05/20/24    Expected End:  07/29/24       Patient will demonstrate an increase in L hand coordination as evident by completing 9 hole peg test in 35 seconds or less.

## 2024-06-03 NOTE — PROGRESS NOTES
"Physical Therapy    Physical Therapy Treatment    Patient Name: Elijah Chase  MRN: [id number]  Today's Date: 6/3/2024  Time Calculation  Start Time: 1500  Stop Time: 1545  Time Calculation (min): 45 min     PT Therapeutic Procedures Time Entry  Neuromuscular Re-Education Time Entry: 20  Gait Training Time Entry: 25    Insurance: [facility] (30 vitis PT/OT)    Plan of Care: 5/20/24 - 8/18/24  Visit Number: 2  Payor: [facility] / Plan: [facility] / Product Type: *No Product type* /   Priyanka Holguin  Assessment:  He did well navigating obstacles today and varying his gait on random command. But there is notable dyscoordination,weakness, and deviations of LLE that exacerbate with increased challenge. He caught his left toe on the bruna and gym mat 4 times total. Left arm postures into excessive tonic flexion during gait and NMR challenges of LE(s). With cueing he is able to relax is LUE fairly well.     Plan:    Skilled physical therapy services are appropriate and beneficial in order to achieve measurable and meaningful change in the objective tests and measures. Utilization of skilled physical therapy services will aid in advancing his functional status and attaining his therapy-related goals.     Current Problem:  1. Weakness due to acute stroke (Multi)  Follow Up In Physical Therapy      2. Abnormality of gait and mobility  Follow Up In Physical Therapy      3. Impairment of balance  Follow Up In Physical Therapy        HPI per chart review:  \"Patient had a right sided stroke 3/8/24 at [facility] in [place]. Patient went to inpatient rehabilitation at [facility] for 2 weeks and completed IPR on 3/31/24. They did not set patient up with PT or OT as they were not within the state. Has not been doing formal therapy since discharge from [facility]. Deficits following stroke include left sided weakness, difficulties with swallowing, speech, cognition but symptoms have been improving.  Also having " "right sided flank pain. Also having difficulties with vision on left, possible neglect on the left side. Denies any spasticity.\"      SUBJECTIVE  Subjective   Elijah Chase  is a 63 y.o. male  presenting to the clinic with chief complaint of Acute Stroke 3/8/24. He c/o residual L sided weakness, impaired balance, walking, coordination.He c/o peripheral visional deficits.     General:   He has been walking 4000 steps on the days that he does not come to therapy. He uses the rollator and takes 2 rest breaks of 5 minutes.     Precautions   STEADI score of 6 (fall risk)     Pain   Denies pain.     Objective   Treatments:  Neuromuscular Re-Education  - recumbent stepper x 5 minutes. Level 4   - heel raises with most weight on left leg. 3 x 10. With UE support  - half braiding 10ft x 8 in // bars  - sidestepping with yellow t-band around forefeet. 10ft x 6 in // bars  - left heel taps to targets on trampoline with 3lb weight. X 3 minutes    Gait training/functional training:  Variable forward, forward fast, and retro ambulation randomly on command. X 7 minutes  Ambulated 225 feet without device with 3lb ankle weight on left ankle  Obstacle course ambulation: figure 8, over hurdles, over gym mat.  Then picked up obstacles and carried them to put them away.     EDUCATION:  Access Code: [email or web address]  URL: [email or web address]  Date: 05/20/2024  Prepared by: Katt Banks    Exercises  - Sit to Stand  - 1 x daily - 7 x weekly - 3 sets - 10 reps  - Standing Hip Abduction with Counter Support  - 1 x daily - 7 x weekly - 3 sets - 10 reps  - Standing Hip Extension with Counter Support  - 1 x daily - 7 x weekly - 3 sets - 10 reps  - Standing March with Counter Support  - 1 x daily - 7 x weekly - 3 sets - 10 reps  - Heel Toe Raises with Counter Support  - 1 x daily - 7 x weekly - 3 sets - 10 reps      Goals:  Active       PT Problem       PT Goal 1       Start:  05/20/24    Expected End:  08/18/24       Elijah Chase will " complete TUG within 12 seconds or less (indicative of higher fall risk) in order to increase balance and enhance safety during ADLs/ IADLs.          PT Goal 2       Start:  05/20/24    Expected End:  08/18/24       Patient will perform 5xSTS test in </= 11 sec to demonstrate improved LE strength and decrease risk of falls         PT Goal 3       Start:  05/20/24    Expected End:  08/18/24       Patient will score >/= 2/30 on FGA to meet MDC for stroke population and demonstrate improvement in balance and functional mobility         PT Goal 4       Start:  05/20/24    Expected End:  08/18/24       Elijah Chase  will increase the 10MWT score by >/=0.16m/s (MCID) and > 0.8 m/s which is predictive of community ambulation post stroke.   Gait speed on the 10MWT in regards to ambulation classification: </= 0.4 m/s household ambulator; 0.4 m/s-0.8 m/s limited community ambulation; > 0.8 m/s community ambulator)          PT Goal 5       Start:  05/20/24    Expected End:  08/18/24       Elijah Chase will demonstrate independence and report compliance with HEP to facilitate independent rehab program upon discharge.         Patient Stated Goal 1       Start:  05/20/24    Expected End:  08/18/24       Get stronger

## 2024-06-04 ENCOUNTER — APPOINTMENT (OUTPATIENT)
Dept: NEUROLOGY | Facility: HOSPITAL | Age: 64
End: 2024-06-04
Payer: COMMERCIAL

## 2024-06-05 ENCOUNTER — TREATMENT (OUTPATIENT)
Dept: PHYSICAL THERAPY | Facility: CLINIC | Age: 64
End: 2024-06-05
Payer: COMMERCIAL

## 2024-06-05 ENCOUNTER — TREATMENT (OUTPATIENT)
Dept: OCCUPATIONAL THERAPY | Facility: CLINIC | Age: 64
End: 2024-06-05
Payer: COMMERCIAL

## 2024-06-05 DIAGNOSIS — I63.9 WEAKNESS DUE TO ACUTE STROKE (MULTI): ICD-10-CM

## 2024-06-05 DIAGNOSIS — R26.9 ABNORMALITY OF GAIT AND MOBILITY: Primary | ICD-10-CM

## 2024-06-05 DIAGNOSIS — R53.1 WEAKNESS DUE TO ACUTE STROKE (MULTI): ICD-10-CM

## 2024-06-05 DIAGNOSIS — R26.89 IMPAIRMENT OF BALANCE: ICD-10-CM

## 2024-06-05 DIAGNOSIS — G81.94 LEFT HEMIPARESIS (MULTI): Primary | ICD-10-CM

## 2024-06-05 PROCEDURE — 97140 MANUAL THERAPY 1/> REGIONS: CPT | Mod: GO

## 2024-06-05 PROCEDURE — 97110 THERAPEUTIC EXERCISES: CPT | Mod: GO

## 2024-06-05 PROCEDURE — 97112 NEUROMUSCULAR REEDUCATION: CPT | Mod: GP

## 2024-06-05 ASSESSMENT — PAIN - FUNCTIONAL ASSESSMENT: PAIN_FUNCTIONAL_ASSESSMENT: 0-10

## 2024-06-05 ASSESSMENT — PAIN SCALES - GENERAL: PAINLEVEL_OUTOF10: 0 - NO PAIN

## 2024-06-05 NOTE — PROGRESS NOTES
"Occupational Therapy    Occupational Therapy Treatment    Name: Elijah Chase \"Gene\"  MRN: [id number]  : 1960  Date: 24  Time Calculation  Start Time: 935  Stop Time:   Time Calculation (min): 40 min     OT Therapeutic Procedures Time Entry  Manual Therapy Time Entry: 10  Therapeutic Exercise Time Entry: 30                  Visit: 6  [facility]   24-24  30 OT/PT combined   Primary dx: L hemiparesis   Assessment:   Patient responded well to treatment session, making steady progress towards functional goals (see objective section.)  Plan:   Continue with skilled OT POC with focus on increasing LUE function.     Subjective   Patient agreeable to treatment session. \"Can we go over the bands again? I want to make sure I am doing them right.\"    Pain Assessment:  Pain Assessment  Pain Assessment: 0-10  Pain Score: 0 - No pain  Precautions:  Fall risk    Objective      Manual Therapy: 10 mins  L scapular mobs completed: elevation/depression, abduction/adduction    Therapeutic Exercise: 30 mins  Patient completed green theraband exercises, 2 sets of 10: shoulder extension, rows, external rotation, elbow flexion     Patient given L upper trap stretch 3x 30 seconds    L : 45#  L 9 hole peg test: 34 seconds    OP EDUCATION:   Continue with home program- added in L upper trap stretch    Goals:  Active       OT Goals       HEP       Start:  24    Expected End:  24       Will establish a LUE HEP which will include stretching, ROM, strengthening and coordination.         Strength       Start:  24    Expected End:  24       Patient will increase LUE strength by 1/2 muscle grade and L  strength by 5-10# for ADL/IADL tasks         Coordination       Start:  24    Expected End:  24       Patient will demonstrate an increase in L hand coordination as evident by completing 9 hole peg test in 35 seconds or less.                     "

## 2024-06-05 NOTE — PROGRESS NOTES
"Physical Therapy    Physical Therapy Treatment    Patient Name: Elijah Chase  MRN: [id number]  Today's Date: 6/5/2024  Time Calculation  Start Time: 0845  Stop Time: 0935  Time Calculation (min): 50 min     PT Therapeutic Procedures Time Entry  Neuromuscular Re-Education Time Entry: 50    Insurance: [facility] (30 vitis PT/OT)    Plan of Care: 5/20/24 - 8/18/24  Primary Diagnosis: Abnormal Gait and Mobility  Visit Number: 3    Assessment:  Moderate-High intensity interval training is utilized in stroke rehab to increase neuroplastic changes and increase functional recovery.  Elijah Chase completed treatment today which focused upon moderate intensity gait training (HIGT) in order to address the following biomechanical subcomponents of gait: stance, swing, propulsion, lateral stability. He requires CGA x1 throughout session ethan with reactive stepping. Gene was challenged with hurdles as he caught his toes 5-6 times and resistive walking ethan reactive stepping/balance upon return. He demonstrated decreased L stance time with all activities requiring cues to take longer steps with gait or braided walking or perform reciprocal bruna negotiation. He continues to be at an elevated fall risk based upon these current gait deviations. Patient's left arm continues to posture into excessive tonic flexion during gait and NMR challenges of LE(s), however, with cueing he is able to relax is LUE fairly well.     Plan:    Continue per plan of care established on initial evaluation.     Current Problem:  1. Abnormality of gait and mobility  Follow Up In Physical Therapy      2. Weakness due to acute stroke (Multi)  Follow Up In Physical Therapy      3. Impairment of balance  Follow Up In Physical Therapy        HPI per chart review:  \"Patient had a right sided stroke 3/8/24 at [facility] in [place]. Patient went to inpatient rehabilitation at [facility] for 2 weeks and completed IPR on 3/31/24.\"      Subjective   He continues walking 4000 steps " on the days that he does not come to therapy.    Precautions   STEADI score of 6 (fall risk)    Pain   Denies pain.     Objective   Treatments:  Neuromuscular Re-Education (4lb wt donned on L LE throughout)  - recumbent stepper x 6 minutes. Level 4.5  - Non-reciprocal FWD negotiation of 5 hurdles x 3 passes d/b  *L toes got caught 4-6 times  - Reciprocal FWD negotiation of 5 hurdles x 3 passes d/b  *VC for increased stance time on L  - sidestepping with yellow t-band around forefeet. 10ft x 6  - half braiding 10ft x 8 at CGA  - TRX squat 2 x 12  - TRX lateral lunge into march x 6 ea  *for strength and SLS balance/stability (pt with L instability)  - At cable column with 17.5lb, resistive forward and backward walking 10ft x 4 ea  - Ambulated 225 feet without device with 3lb ankle weight on left ankle, with variable fast, slow, retro ambulation on command.      EDUCATION:  Access Code: [email or web address]  URL: [email or web address]  Date: 06/05/2024  Prepared by: Katt Banks    Exercises  - Sit to Stand  - 1 x daily - 7 x weekly - 3 sets - 10 reps  - Standing Hip Abduction with Counter Support  - 1 x daily - 7 x weekly - 3 sets - 10 reps  - Standing Hip Extension with Counter Support  - 1 x daily - 7 x weekly - 3 sets - 10 reps  - Standing March with Counter Support  - 1 x daily - 7 x weekly - 3 sets - 10 reps  - Heel Toe Raises with Counter Support  - 1 x daily - 7 x weekly - 3 sets - 10 reps  - Side Stepping with Resistance at Feet  - 1 x daily - 7 x weekly - 3 sets - 10 reps  - Carioca with Counter Support  - 1 x daily - 7 x weekly - 3 sets - 10 reps      Goals:  Active       PT Problem       PT Goal 1       Start:  05/20/24    Expected End:  08/18/24       Elijah Chase will complete TUG within 12 seconds or less (indicative of higher fall risk) in order to increase balance and enhance safety during ADLs/ IADLs.          PT Goal 2       Start:  05/20/24    Expected End:  08/18/24       Patient will perform 5xSTS  test in </= 11 sec to demonstrate improved LE strength and decrease risk of falls         PT Goal 3       Start:  05/20/24    Expected End:  08/18/24       Patient will score >/= 20/30 on FGA to meet MDC for stroke population and demonstrate improvement in balance and functional mobility         PT Goal 4       Start:  05/20/24    Expected End:  08/18/24       Elijah Chase  will increase the 10MWT score by >/=0.16m/s (MCID) and > 0.8 m/s which is predictive of community ambulation post stroke.   Gait speed on the 10MWT in regards to ambulation classification: </= 0.4 m/s household ambulator; 0.4 m/s-0.8 m/s limited community ambulation; > 0.8 m/s community ambulator)          PT Goal 5       Start:  05/20/24    Expected End:  08/18/24       Elijah Chase will demonstrate independence and report compliance with HEP to facilitate independent rehab program upon discharge.         Patient Stated Goal 1       Start:  05/20/24    Expected End:  08/18/24       Get stronger

## 2024-06-06 ENCOUNTER — TREATMENT (OUTPATIENT)
Dept: PHYSICAL THERAPY | Facility: CLINIC | Age: 64
End: 2024-06-06
Payer: COMMERCIAL

## 2024-06-06 DIAGNOSIS — R26.9 ABNORMALITY OF GAIT AND MOBILITY: Primary | ICD-10-CM

## 2024-06-06 DIAGNOSIS — I63.9 WEAKNESS DUE TO ACUTE STROKE (MULTI): ICD-10-CM

## 2024-06-06 DIAGNOSIS — R53.1 WEAKNESS DUE TO ACUTE STROKE (MULTI): ICD-10-CM

## 2024-06-06 DIAGNOSIS — R26.89 IMPAIRMENT OF BALANCE: ICD-10-CM

## 2024-06-06 PROCEDURE — 97112 NEUROMUSCULAR REEDUCATION: CPT | Mod: GP | Performed by: PHYSICAL THERAPIST

## 2024-06-06 NOTE — PROGRESS NOTES
"Physical Therapy    Physical Therapy Treatment    Patient Name: Elijah Chase  MRN: [id number]  Today's Date: 6/6/2024  Time Calculation  Start Time: 1115  Stop Time: 1205  Time Calculation (min): 50 min     PT Therapeutic Procedures Time Entry  Neuromuscular Re-Education Time Entry: 50    Insurance: [facility] (30 vitis PT/OT)    Plan of Care: 5/20/24 - 8/18/24  Primary Diagnosis: Abnormal Gait and Mobility  Visit Number: 4    Assessment:  There are significant sensory-proprioceptive awareness deficits of LLE that impacts his safety, stepping, positioning, and weight bearing of LLE. It appears that ankle weight does not improve his LLE proprioceptive awareness.  Poor grading and amplitude leads to errors in LLE movement. He continues to be at an elevated fall risk based upon his current gait deviations, reduced proprioceptive LLE awareness, and decreased LLE motor control. Patient's left arm continues to posture into excessive tonic flexion during gait and NMR challenges of LE(s), however, with cueing he is able to relax is LUE fairly well.     Plan:    Continue per plan of care established on initial evaluation.     Primary Dx: Abnormality of gait and mobility  Current Problem:  1. Abnormality of gait and mobility  Follow Up In Physical Therapy      2. Weakness due to acute stroke (Multi)  Follow Up In Physical Therapy      3. Impairment of balance  Follow Up In Physical Therapy        HPI per chart review:  \"Patient had a right sided stroke 3/8/24 at [facility] in [place]. Patient went to inpatient rehabilitation at [facility] for 2 weeks and completed IPR on 3/31/24.\"    Subjective   Patient states that he was very challenged by his last PT session.  He continues walking 4000 steps on the days that he does not come to therapy.    Precautions   STEADI score of 6 (fall risk)    Pain   Denies pain.     Objective   Treatments:  Neuromuscular Re-Education   - recumbent bike x 3 mins forward and 3 mins backward. Level 4.5  - L LE " runners step up w/right knee drive. Light R fingertip support advanced to no support (difficult)  - Resisted walking forward/backward at cable column. 12.5 lbs  - Resisted sidestepping with cable column. 12.5 lbs to the left, 7.5 lbs to the right. Loss of balance when returning from right step out d/t reduced LLE output  - soccer ball drills with harness system for safety. Stopping ball with either foot, 5 sec balance, then kick ball back  - half braiding 10ft x 8 at CGA  - therapy ladder: in-in-out-out x 5 mins, sidestepping x 4 mins    Not performed today:  - Non-reciprocal FWD negotiation of 5 hurdles x 3 passes d/b  *L toes got caught 4-6 times  - Reciprocal FWD negotiation of 5 hurdles x 3 passes d/b  *VC for increased stance time on L  - sidestepping with yellow t-band around forefeet. 10ft x 6  - TRX squat 2 x 12  - TRX lateral lunge into march x 6 ea  *for strength and SLS balance/stability (pt with L instability)  - At cable column with 17.5lb, resistive forward and backward walking 10ft x 4 ea  - Ambulated 225 feet without device with 3lb ankle weight on left ankle, with variable fast, slow, retro ambulation on command.      EDUCATION:  Access Code: [email or web address]  URL: [email or web address]  Date: 06/05/2024  Prepared by: Katt Banks    Exercises  - Sit to Stand  - 1 x daily - 7 x weekly - 3 sets - 10 reps  - Standing Hip Abduction with Counter Support  - 1 x daily - 7 x weekly - 3 sets - 10 reps  - Standing Hip Extension with Counter Support  - 1 x daily - 7 x weekly - 3 sets - 10 reps  - Standing March with Counter Support  - 1 x daily - 7 x weekly - 3 sets - 10 reps  - Heel Toe Raises with Counter Support  - 1 x daily - 7 x weekly - 3 sets - 10 reps  - Side Stepping with Resistance at Feet  - 1 x daily - 7 x weekly - 3 sets - 10 reps  - Carioca with Counter Support  - 1 x daily - 7 x weekly - 3 sets - 10 reps      Goals:  Active       PT Problem       PT Goal 1       Start:  05/20/24    Expected  End:  08/18/24       Elijah Chase will complete TUG within 12 seconds or less (indicative of higher fall risk) in order to increase balance and enhance safety during ADLs/ IADLs.          PT Goal 2       Start:  05/20/24    Expected End:  08/18/24       Patient will perform 5xSTS test in </= 11 sec to demonstrate improved LE strength and decrease risk of falls         PT Goal 3       Start:  05/20/24    Expected End:  08/18/24       Patient will score >/= 20/30 on FGA to meet MDC for stroke population and demonstrate improvement in balance and functional mobility         PT Goal 4       Start:  05/20/24    Expected End:  08/18/24       Elijah Chase  will increase the 10MWT score by >/=0.16m/s (MCID) and > 0.8 m/s which is predictive of community ambulation post stroke.   Gait speed on the 10MWT in regards to ambulation classification: </= 0.4 m/s household ambulator; 0.4 m/s-0.8 m/s limited community ambulation; > 0.8 m/s community ambulator)          PT Goal 5       Start:  05/20/24    Expected End:  08/18/24       Elijah Chase will demonstrate independence and report compliance with HEP to facilitate independent rehab program upon discharge.         Patient Stated Goal 1       Start:  05/20/24    Expected End:  08/18/24       Get stronger

## 2024-06-07 ENCOUNTER — APPOINTMENT (OUTPATIENT)
Dept: PHYSICAL MEDICINE AND REHAB | Facility: CLINIC | Age: 64
End: 2024-06-07
Payer: COMMERCIAL

## 2024-06-10 ENCOUNTER — TREATMENT (OUTPATIENT)
Dept: PHYSICAL THERAPY | Facility: CLINIC | Age: 64
End: 2024-06-10
Payer: COMMERCIAL

## 2024-06-10 ENCOUNTER — TREATMENT (OUTPATIENT)
Dept: SPEECH THERAPY | Facility: CLINIC | Age: 64
End: 2024-06-10
Payer: COMMERCIAL

## 2024-06-10 ENCOUNTER — TREATMENT (OUTPATIENT)
Dept: OCCUPATIONAL THERAPY | Facility: CLINIC | Age: 64
End: 2024-06-10
Payer: COMMERCIAL

## 2024-06-10 DIAGNOSIS — R26.9 ABNORMALITY OF GAIT AND MOBILITY: Primary | ICD-10-CM

## 2024-06-10 DIAGNOSIS — I69.319 COGNITIVE DEFICIT STATUS POST CEREBROVASCULAR ACCIDENT: ICD-10-CM

## 2024-06-10 DIAGNOSIS — R26.89 IMPAIRMENT OF BALANCE: ICD-10-CM

## 2024-06-10 DIAGNOSIS — R53.1 WEAKNESS DUE TO ACUTE STROKE (MULTI): ICD-10-CM

## 2024-06-10 DIAGNOSIS — I63.9 WEAKNESS DUE TO ACUTE STROKE (MULTI): ICD-10-CM

## 2024-06-10 DIAGNOSIS — I69.322 DYSARTHRIA FOLLOWING CEREBROVASCULAR ACCIDENT: ICD-10-CM

## 2024-06-10 DIAGNOSIS — G81.94 LEFT HEMIPARESIS (MULTI): Primary | ICD-10-CM

## 2024-06-10 PROCEDURE — 97112 NEUROMUSCULAR REEDUCATION: CPT | Mod: GO,59

## 2024-06-10 PROCEDURE — 92507 TX SP LANG VOICE COMM INDIV: CPT | Mod: GN

## 2024-06-10 PROCEDURE — 97112 NEUROMUSCULAR REEDUCATION: CPT | Mod: GP

## 2024-06-10 PROCEDURE — 97110 THERAPEUTIC EXERCISES: CPT | Mod: GO,59

## 2024-06-10 ASSESSMENT — PAIN SCALES - GENERAL
PAINLEVEL_OUTOF10: 0 - NO PAIN
PAINLEVEL_OUTOF10: 0 - NO PAIN

## 2024-06-10 ASSESSMENT — PAIN - FUNCTIONAL ASSESSMENT
PAIN_FUNCTIONAL_ASSESSMENT: 0-10
PAIN_FUNCTIONAL_ASSESSMENT: 0-10

## 2024-06-10 NOTE — PROGRESS NOTES
"Occupational Therapy    Occupational Therapy Treatment    Name: Elijah Chase \"Gene\"  MRN: [id number]  : 1960  Date: 06/10/24  Time Calculation  Start Time: 1005  Stop Time: 1045  Time Calculation (min): 40 min     OT Therapeutic Procedures Time Entry  Neuromuscular Re-Education Time Entry: 30  Therapeutic Exercise Time Entry: 10                  Visit: 7  [facility]   24-24  30 OT/PT combined   Primary dx: L hemiparesis     Assessment:   Patient responded well to treatment session. Patient reports doing more at home ex.  making self breakfast, follows alarms on phone and knows when to take medication, reports doing own laundry (all on one floor- no stairs)  Plan:   Continue with skilled OT POC with focus on increasing LUE function.     Subjective   Patient agreeable to treatment session. No new issues to report.    Pain Assessment:  Pain Assessment  Pain Assessment: 0-10  Pain Score: 0 - No pain  Precautions:  Fall risk    Objective    L : 45#  L 9 hole peg test: 34 seconds    Neuro Re-ed:  30 mins  Completed closed chained movement for the LUE by completing [employer] bike with BUE's only. Completed 5 minutes forwards and 5 minutes backwards on level 2.0, RPM 49    Completed functional mobility with dual tasking of bouncing a medium sized medicine ball with BUE's and verbalizing to therapist the medications patient  takes daily. Patient was able to complete without stopping, however LLE scuffed floor occasionally, requiring cues to slow down and focus on LLE clearance. No LOB noted.     Therapeutic Exercise: 10 mins  Completed modified crunches sitting high perched EOM with holding small yellow therband ball 2 sets of 10.    OP EDUCATION:   Continue with home program- added in core strengthening exercises     Goals:  Active       OT Goals       HEP       Start:  24    Expected End:  24       Will establish a LUE HEP which will include stretching, ROM, strengthening and coordination.         " Strength       Start:  05/20/24    Expected End:  07/29/24       Patient will increase LUE strength by 1/2 muscle grade and L  strength by 5-10# for ADL/IADL tasks         Coordination       Start:  05/20/24    Expected End:  07/29/24       Patient will demonstrate an increase in L hand coordination as evident by completing 9 hole peg test in 35 seconds or less.

## 2024-06-10 NOTE — PROGRESS NOTES
"Physical Therapy    Physical Therapy Treatment    Patient Name: Elijah Chase  MRN: [id number]  Today's Date: 6/10/2024  Time Calculation  Start Time: 0917  Stop Time: 1003  Time Calculation (min): 46 min     PT Therapeutic Procedures Time Entry  Neuromuscular Re-Education Time Entry: 46    Insurance: [facility] (30 vitis PT/OT)    Plan of Care: 5/20/24 - 8/18/24  Primary Diagnosis: Abnormal Gait and Mobility  Visit Number: 5    Assessment:  Elijah Chase completed treatment today which focused upon moderate intensity gait training  in order to address the following biomechanical subcomponents of gait: stance, swing, propulsion, lateral stability. He requires CGA x1 throughout session and use of stepping strategy during resistive walking to maintain balance. He demonstrates difficulty with half braid and agility ladder in terms of sequencing and L LE placement, however, with demonstration and patient verbally calling out steps, he is with improved form and sequencing. Patient performed L LE runner step up without UE support demonstrating mild L LE instability. Patient's left arm continues to posture into excessive tonic flexion during gait and NMR challenges of LE(s), however, with cueing he is able to relax is LUE fairly well.     Plan:    Continue per plan of care established on initial evaluation.     Primary Dx: Abnormality of gait and mobility  Current Problem:  1. Abnormality of gait and mobility  Follow Up In Physical Therapy      2. Weakness due to acute stroke (Multi)  Follow Up In Physical Therapy      3. Impairment of balance  Follow Up In Physical Therapy        HPI per chart review:  \"Patient had a right sided stroke 3/8/24 at [facility] in [place]. Patient went to inpatient rehabilitation at [facility] for 2 weeks and completed IPR on 3/31/24.\"    Subjective   Patient states he is well and continues to ambulate 4,000 steps with rollator.     Precautions   STEADI score of 6 (fall risk)    Pain   Denies pain. " "    Objective   Treatments:  Neuromuscular Re-Education   - recumbent bike x 3 mins forward and 3 mins backward. Level 5  - At cable column with 17.5lb, resistive forward walking 10ft x 4  - At cable column with 17.5lb, resistive side stepping 10ft x 4 R/L *pt with 2 LOB and stepping strategy  - At cable column with 12.5lb, resistive backward walking 10ft x 4   - L LE runners step up onto 8\" step w/right knee drive x 10 *no UE support  - 8\" step step up x 10 R/L *no UE support and cues for ant wt shift  - soccer ball drills with harness system for safety. Stopping ball with R foot, 5 sec balance, then kick ball back with either foot  - half braiding 10ft x 8 at CGA  - agility ladder: in-in-out-out x 3 mins    Not performed today:  - Reciprocal FWD negotiation of 5 hurdles x 3 passes d/b  *VC for increased stance time on L  - TRX squat 2 x 12  - TRX lateral lunge into march x 6 ea  *for strength and SLS balance/stability (pt with L instability)      EDUCATION:  Access Code: [email or web address]  URL: [email or web address]  Date: 06/05/2024  Prepared by: Katt Banks    Exercises  - Sit to Stand  - 1 x daily - 7 x weekly - 3 sets - 10 reps  - Standing Hip Abduction with Counter Support  - 1 x daily - 7 x weekly - 3 sets - 10 reps  - Standing Hip Extension with Counter Support  - 1 x daily - 7 x weekly - 3 sets - 10 reps  - Standing March with Counter Support  - 1 x daily - 7 x weekly - 3 sets - 10 reps  - Heel Toe Raises with Counter Support  - 1 x daily - 7 x weekly - 3 sets - 10 reps  - Side Stepping with Resistance at Feet  - 1 x daily - 7 x weekly - 3 sets - 10 reps  - Carioca with Counter Support  - 1 x daily - 7 x weekly - 3 sets - 10 reps      Goals:  Active       PT Problem       PT Goal 1       Start:  05/20/24    Expected End:  08/18/24       Elijah Chase will complete TUG within 12 seconds or less (indicative of higher fall risk) in order to increase balance and enhance safety during ADLs/ IADLs.          PT " Goal 2       Start:  05/20/24    Expected End:  08/18/24       Patient will perform 5xSTS test in </= 11 sec to demonstrate improved LE strength and decrease risk of falls         PT Goal 3       Start:  05/20/24    Expected End:  08/18/24       Patient will score >/= 20/30 on FGA to meet MDC for stroke population and demonstrate improvement in balance and functional mobility         PT Goal 4       Start:  05/20/24    Expected End:  08/18/24       Elijah Chase  will increase the 10MWT score by >/=0.16m/s (MCID) and > 0.8 m/s which is predictive of community ambulation post stroke.   Gait speed on the 10MWT in regards to ambulation classification: </= 0.4 m/s household ambulator; 0.4 m/s-0.8 m/s limited community ambulation; > 0.8 m/s community ambulator)          PT Goal 5       Start:  05/20/24    Expected End:  08/18/24       Elijah Chase will demonstrate independence and report compliance with HEP to facilitate independent rehab program upon discharge.         Patient Stated Goal 1       Start:  05/20/24    Expected End:  08/18/24       Get stronger

## 2024-06-10 NOTE — PROGRESS NOTES
"Speech-Language Pathology    SLP Adult Outpatient Speech-Language Pathology Treatment     Patient Name: Elijah Chase \"Gene\"  MRN: [id number]  Today's Date: 6/10/2024  Time Calculation  Start Time: 830  Stop Time: 915  Time Calculation (min): 45 min      Current Problem:   1. Dysarthria following cerebrovascular accident  Follow Up In Speech Therapy      2. Cognitive deficit status post cerebrovascular accident  Follow Up In Speech Therapy          SLP Assessment:  SLP TX Intervention Outcome: Making Progress Towards Goals  Treatment Provided: Yes, see Objective for details  Treatment Tolerance: Patient tolerated treatment well      Plan:  SLP TX Plan: Continue Plan of Care  SLP Frequency: 1x per week  Discussed POC: Patient  Discussed Risks/Benefits: Yes, Patient  Patient/Caregiver Agreeable: Yes      Subjective   Patient arrived to and attended treatment independently. This is his first F/U visit since his initial evaluation on . Assessment results reviewed. Patient reported persistent motor speech and cognitive deficits.       General Visit Information:  Number of Authorized Treatments : 30 visits combined ST/PT/OT ([facility])  Total Number of Visits : 2      Pain Assessment:   Pain Assessment: 0-10  Pain Score: 0 - No pain      Objective     GOALS: Start date: 2024; End/re-eval date: 2024  By discharge:  LT. Patient will independently and effectively communicate in valued conversations in the home and social settings using compensatory strategies as needed.  2. Patient will independently utilize compensatory strategies/tools for cognition in order to improve effectiveness of IADL management in the home, work, and community settings.     STGs:   1. Patient will utilize learned intelligibility enhancing strategies (e.g. reduced rate, over-articulation, adequate volume) during reading activities with >= 95% accuracy with minimal verbal reminders.  > ADDRESSED: Intelligibility enhancing compensatory " strategies introduced this date. SLP instructed patient on use of the following techniques: 1. Reduced rate of speech/pausing/pacing; 2. Over-articulation/enunciation/distinct word boundaries; 3. Adequate volume for setting. Patient utilized strategies during a sentence reading activity (Philip sentences) with 90% accuracy given 1:1-1:2 modeling of strategies after initial instruction. Handout of sentences provided for home practice.    2. Patient will utilize learned intelligibility enhancing strategies (e.g. reduced rate, over-articulation, adequate volume) during structured, spontaneous speech production activities with >= 90% accuracy with minimal verbal reminders.  > NOT ADDRESSED: Patient with mildly reduced intelligibility conversationally, requiring SLP to request occasional repetition by patient for comprehension of message.    3. Patient will independently utilize compensatory strategies/tools for memory, attention, and executive functioning with 90% accuracy in order to improve effectiveness of IADL management in the home, work, and community settings.  > ADDRESSED: Patient reported that he looks at his appointment list for rehab x1/week and is able to recall what dates/days and times he has scheduled for his appointments. He was able to quickly recall that he also has appointments on Wednesday this week. Continue to explore how cognitive deficits are impacting daily life.        Outpatient Education:  Written Education : Intelligibility enhancing strategies; HEP re: Philip sentences; ENT contact/scheduling information  Verbal Education : Review of assessment results; intelligibility enhancing strategies; HEP re: Philip sentences  Risk and Benefits Discussed with Patient/Caregiver/Other: yes  Patient/Caregiver Demonstrated Understanding: yes  Plan of Care Discussed and Agreed Upon: yes  Patient Response to Education: Patient/Caregiver Verbalized Understanding of Information, Patient/Caregiver Asked  Appropriate Questions

## 2024-06-12 ENCOUNTER — TREATMENT (OUTPATIENT)
Dept: PHYSICAL THERAPY | Facility: CLINIC | Age: 64
End: 2024-06-12
Payer: COMMERCIAL

## 2024-06-12 ENCOUNTER — TREATMENT (OUTPATIENT)
Dept: OCCUPATIONAL THERAPY | Facility: CLINIC | Age: 64
End: 2024-06-12
Payer: COMMERCIAL

## 2024-06-12 DIAGNOSIS — I63.9 WEAKNESS DUE TO ACUTE STROKE (MULTI): ICD-10-CM

## 2024-06-12 DIAGNOSIS — R53.1 WEAKNESS DUE TO ACUTE STROKE (MULTI): ICD-10-CM

## 2024-06-12 DIAGNOSIS — R26.9 ABNORMALITY OF GAIT AND MOBILITY: Primary | ICD-10-CM

## 2024-06-12 DIAGNOSIS — G81.94 LEFT HEMIPARESIS (MULTI): Primary | ICD-10-CM

## 2024-06-12 DIAGNOSIS — R26.89 IMPAIRMENT OF BALANCE: ICD-10-CM

## 2024-06-12 PROCEDURE — 97112 NEUROMUSCULAR REEDUCATION: CPT | Mod: GP

## 2024-06-12 PROCEDURE — 97110 THERAPEUTIC EXERCISES: CPT | Mod: GO

## 2024-06-12 PROCEDURE — 97112 NEUROMUSCULAR REEDUCATION: CPT | Mod: GO

## 2024-06-12 ASSESSMENT — PAIN - FUNCTIONAL ASSESSMENT: PAIN_FUNCTIONAL_ASSESSMENT: 0-10

## 2024-06-12 ASSESSMENT — PAIN SCALES - GENERAL: PAINLEVEL_OUTOF10: 0 - NO PAIN

## 2024-06-12 NOTE — PROGRESS NOTES
Physical Therapy    Physical Therapy Treatment    Patient Name: Elijah Chase  MRN: [id number]  Today's Date: 6/12/2024  Time Calculation  Start Time: 0918  Stop Time: 1005  Time Calculation (min): 47 min     PT Therapeutic Procedures Time Entry  Neuromuscular Re-Education Time Entry: 45    Insurance: [facility] (30 vitis PT/OT)    Plan of Care: 5/20/24 - 8/18/24  Primary Diagnosis: Abnormal Gait and Mobility  Visit Number: 6    Assessment:  Elijah Chase completed treatment today which focused upon moderate intensity gait training  in order to address the following biomechanical subcomponents of gait: stance, swing, propulsion, lateral stability. He requires CGA to CS throughout session and use of stepping strategy during resistive pushing, outdoor walking on grass, and lunge to bosu ball to maintain balance. His balance was adequately challenged with outdoor multidirectional ambulation on uneven grass surface as well as half braiding. He continues to have significant sensory-proprioceptive awareness deficits of LLE impacting his safety and ambulation. He continues to require cues to relax L UE as he tends to demonstrate L UE tonic flexion.    Plan:    Continue per plan of care established on initial evaluation.     Primary Dx: Abnormality of gait and mobility  Current Problem:  1. Abnormality of gait and mobility  Follow Up In Physical Therapy      2. Weakness due to acute stroke (Multi)  Follow Up In Physical Therapy      3. Impairment of balance  Follow Up In Physical Therapy          Subjective   Patient states he is well and no new events.    Precautions   STEADI score of 6 (fall risk)    Pain   Denies pain.     Objective   Treatments:  Neuromuscular Re-Education   - Multidirectional walking (fwd, retro, lateral) with pink SB bounce/toss x 5 min  - At cable column with 12.5 lb, pushing forward 4 x 8'  - At cable column with 7.5 lb, pulling backward 4 x 8'  - Lunge onto Bosu ball with UL UE support 2 x 12 R/L         *mirror for visual feedback and max cues for form  - Obstacle course (2 cones, 2 hurdles, airex, and 5 river stones)        - fwd x 3 passes d/b and lateral x 3 passes d/b       - attempted cognitive dual task with obstacle course, however, pt only able to name 5-6 basketball teams throughout all    Outdoor on uneven grass surface:  *cues for increased step length  - forward walking 2 x 50'  - backward walking 2 x 50'  - lateral walking 2 x 50'  - half braiding 2 x 25'    *seated and standing rest breaks provided as needed*    EDUCATION:  Access Code: [email or web address]  URL: [email or web address]  Date: 06/05/2024  Prepared by: Katt Banks    Exercises  - Sit to Stand  - 1 x daily - 7 x weekly - 3 sets - 10 reps  - Standing Hip Abduction with Counter Support  - 1 x daily - 7 x weekly - 3 sets - 10 reps  - Standing Hip Extension with Counter Support  - 1 x daily - 7 x weekly - 3 sets - 10 reps  - Standing March with Counter Support  - 1 x daily - 7 x weekly - 3 sets - 10 reps  - Heel Toe Raises with Counter Support  - 1 x daily - 7 x weekly - 3 sets - 10 reps  - Side Stepping with Resistance at Feet  - 1 x daily - 7 x weekly - 3 sets - 10 reps  - Carioca with Counter Support  - 1 x daily - 7 x weekly - 3 sets - 10 reps      Goals:  Active       PT Problem       PT Goal 1       Start:  05/20/24    Expected End:  08/18/24       Elijah Chase will complete TUG within 12 seconds or less (indicative of higher fall risk) in order to increase balance and enhance safety during ADLs/ IADLs.          PT Goal 2       Start:  05/20/24    Expected End:  08/18/24       Patient will perform 5xSTS test in </= 11 sec to demonstrate improved LE strength and decrease risk of falls         PT Goal 3       Start:  05/20/24    Expected End:  08/18/24       Patient will score >/= 20/30 on FGA to meet MDC for stroke population and demonstrate improvement in balance and functional mobility         PT Goal 4       Start:  05/20/24     Expected End:  08/18/24       Elijah Chase  will increase the 10MWT score by >/=0.16m/s (MCID) and > 0.8 m/s which is predictive of community ambulation post stroke.   Gait speed on the 10MWT in regards to ambulation classification: </= 0.4 m/s household ambulator; 0.4 m/s-0.8 m/s limited community ambulation; > 0.8 m/s community ambulator)          PT Goal 5       Start:  05/20/24    Expected End:  08/18/24       Elijah Chase will demonstrate independence and report compliance with HEP to facilitate independent rehab program upon discharge.         Patient Stated Goal 1       Start:  05/20/24    Expected End:  08/18/24       Get stronger

## 2024-06-12 NOTE — PROGRESS NOTES
"Occupational Therapy    Occupational Therapy Treatment    Name: Elijah Chase \"Gene\"  MRN: [id number]  : 1960  Date: 24  Time Calculation  Start Time: 1005  Stop Time: 1045  Time Calculation (min): 40 min     OT Therapeutic Procedures Time Entry  Neuromuscular Re-Education Time Entry: 30  Therapeutic Exercise Time Entry: 10                  Visit: 8  [facility]   24-24  30 OT/PT combined   Primary dx: L hemiparesis     Assessment:   Patient responded well to treatment session. Patient reported a decrease in tightness in the front of the L shoulder after doing exercises with the air splint on the L elbow today.    Plan:   Continue with skilled OT POC with focus on increasing LUE function.     Subjective   Patient agreeable to treatment session. \"I was able to put in my earring in my left ear without help.\"    Pain Assessment:  Pain Assessment  Pain Assessment: 0-10  Pain Score: 0 - No pain  Precautions:  Fall risk    Objective    L : 45#  L 9 hole peg test: 34 seconds    Neuro Re-ed:  30 mins  Completed the following to decrease flexor synergy pattern and promote normalized movement pattern:   -Therapist placed short arm air cast on L elbow while patient completed L shoulder movements of : flexion, scaption, and abduction ( to ~90 degrees) 10x each with yellow theraband. Therapist gave slight guiding assist at upper L arm and cues to not compensate at shoulder.    Patient completed functional mobility around therapy gym with air cast still applied to the LUE and holding a 1# hand weight to promote proprioceptive input and normalized movement pattern of the LUE. Completed 2 laps with cues for a bigger amplitude of L arm swing.    Therapeutic Exercise: 10 mins  L doorway pec stretch at 70 degree angle, 3x 30 seconds    Completed green flex bar with focus on L hand motor control. Completed 10x each: pronation, supination, wrist flexion, wrist extension.    OP EDUCATION:   Continue with home program- " added in L doorway pec stretch- given in handout form    Goals:  Active       OT Goals       HEP       Start:  05/20/24    Expected End:  07/29/24       Will establish a LUE HEP which will include stretching, ROM, strengthening and coordination.         Strength       Start:  05/20/24    Expected End:  07/29/24       Patient will increase LUE strength by 1/2 muscle grade and L  strength by 5-10# for ADL/IADL tasks         Coordination       Start:  05/20/24    Expected End:  07/29/24       Patient will demonstrate an increase in L hand coordination as evident by completing 9 hole peg test in 35 seconds or less.

## 2024-06-13 NOTE — PROGRESS NOTES
[facility]   Elijah Chase is a 63 y.o. year old male presenting for neurologic evaluation.   PCP Elliot Duran MD    3/2024- presented in [place] with acute L hemiplegia on awakening treated with angioplasty/stenting RMCA stenosis then additional stent for cavernous JARETH dissection.  DC on DAPT, aspirin and brillinta.    4/2024- Established with PCP here- acknowledged DM, did not know medications.  Consult with Neurosurgery (Hu) who recommended to continue DAPT with plan for repeat angio ~9/2024.     6/14/24- Consult for stroke prevention and management.  Residual L UE, LE weakness in PT, OT.  Memory deficits in ST, family supervises medications.  Mood changes- frustrated with limitations.   Vascular risk factors- HTN, HPL, DM     Extensive review of notes in EMR, labs, tests, Interpretation of neuroimaging  As outlined     Lab Results   Component Value Date    GLUCOSE 92 04/08/2024     (L) 04/08/2024    K 4.4 04/08/2024    CL 96 (L) 04/08/2024    CO2 24 04/08/2024    ANIONGAP 18 04/08/2024    BUN 16 04/08/2024    CREATININE 0.83 04/08/2024    CALCIUM 9.8 04/08/2024     Relevant ROS, Problem list, Past Medical/ Surgical/ Family/ Social history- reviewed and pertinent details noted in history.     Objective     Visit Vitals  /59 (BP Location: Right arm, Patient Position: Sitting, BP Cuff Size: Adult)   Pulse 69   Resp 18   Wt 80.3 kg (177 lb)   BMI 26.14 kg/m²   Smoking Status Never   BSA 1.98 m²       Neuro Scores/ Scales:   Modified [place] (mRS) Modified [place] Score: Slight disability.  Able to look after own affairs without assistance, but unable to carry out all previous activities.   Barthel Index  Feeding: independent  Bathing: independent (or in shower)  Grooming: independent face/hair/teeth.shaving (implements provided)  Dressing : independent (including buttons, zips, laces,etc.)  Bowels: independent (including buttons, zips, laces, etc.)  Bladder:  continent  Toilet Use : independent (on and off, dressing, wiping)  Transfers (Bed to chair and back) : independent  Mobility (On level surfaces): independent (but may use any aid for example, stick) > 50 yards  Stairs: independent  Total (0-100): 100   NIHSS NIH Stroke Scale 1A. Level of Consciousness: Alert, Keenly Responsive, 1B. Ask Month and Age: Both Questions Right, 1C. Blink Eyes & Squeeze Hands: Performs Both Tasks, 2. Best Gaze: Normal, 3. Visual: No Visual Loss, 4. Facial Palsy: Partial Paralysis, 5A. Motor - Left Arm: No Drift, 5B. Motor - Right Arm: No Drift, 6A. Motor - Left Leg: No Drift, 6B. Motor - Right Leg: No Drift, 7. Limb Ataxia: Absent, 8. Sensory Loss: Normal, 9. Best Language: No Aphasia, 10. Dysarthria: Mild-to-Moderate Dysarthria, 11. Extinction and Inattention: No Abnormality, NIH Stroke Scale: 3     Orientation- correct month and year, misses day/date  Speech is slow, dysprosodic with normal language Presidents- x3 accurately  3/3 immediate recall, 2/3 delayed recall , 3/3 with clue  Serial 7s- 4/5, 93, 86, 81, 74, 67  Clumsy slow L hand and foot. Ambulates with cane, spastic L hemiparesis.     Assessment/Plan   1. Cerebrovascular accident (CVA) due to stenosis of right middle cerebral artery (Multi)    2. Primary hypertension    3. Dyslipidemia    4. Type 2 diabetes mellitus with other circulatory complication, with long-term current use of insulin (Multi)    5. Spastic hemiplegia of left nondominant side as late effect of cerebral infarction (Multi)    6. Cognitive deficit as late effect of cerebrovascular accident (CVA)      PLAN continue aspirin with brillinta per interventional with plan for followup angio in the fall.   Get labs pending in system.   Will complete disability forms.   Reviewed Goals for STROKE PREVENTION- recommendations to reduce the risk of vascular events and promote brain health include monitoring and management of vascular risk factors and lifestyle choices to  "goal values.     Blood pressure- Normal BP is <120/80 mmHg.  Blood Pressure : Goal is less than 130/80 mmHg  Cholesterol- Ideal lipid profile is an LDL-cholesterol <70 mg/dl, total cholesterol <200 mg/dl, fasting triglycerides < 150 mg/dl and HDL-cholesterol >55 mg/dl.   Blood sugar- Blood Sugar : Goal is fasting sugar  mg/dl, after eating less than 180 mg/dl; HbA1c less than 7%  Healthy physical activity- Goal is a moderate level of exercise at least 30 minutes/day, most days of the week.   Healthy weight- Goal is an ideal weight with a waistline <40\" for men or <35\" for women, and BMI of 18.5-25.   Healthy diet- is rich in vegetables, fruits, whole grains, legumes and fish, low in salt, and avoids red meats and processed/ refined foods.   Healthy sleep- is restorative, ~7 hours/night, with identification and treatment of obstructive/ central sleep apnea that increases the risk of stroke and heart disease.   Stroke Warning Signs- know the symptoms of stroke and the importance of calling 911/EMS to access the quickest treatment.     No orders of the defined types were placed in this encounter.                        "

## 2024-06-14 ENCOUNTER — OFFICE VISIT (OUTPATIENT)
Dept: NEUROLOGY | Facility: CLINIC | Age: 64
End: 2024-06-14
Payer: COMMERCIAL

## 2024-06-14 VITALS
HEART RATE: 69 BPM | RESPIRATION RATE: 18 BRPM | DIASTOLIC BLOOD PRESSURE: 59 MMHG | WEIGHT: 177 LBS | SYSTOLIC BLOOD PRESSURE: 110 MMHG | BODY MASS INDEX: 26.14 KG/M2

## 2024-06-14 DIAGNOSIS — I69.319 COGNITIVE DEFICIT AS LATE EFFECT OF CEREBROVASCULAR ACCIDENT (CVA): ICD-10-CM

## 2024-06-14 DIAGNOSIS — I63.511 CEREBROVASCULAR ACCIDENT (CVA) DUE TO STENOSIS OF RIGHT MIDDLE CEREBRAL ARTERY (MULTI): Primary | ICD-10-CM

## 2024-06-14 DIAGNOSIS — E11.59 TYPE 2 DIABETES MELLITUS WITH OTHER CIRCULATORY COMPLICATION, WITH LONG-TERM CURRENT USE OF INSULIN (MULTI): ICD-10-CM

## 2024-06-14 DIAGNOSIS — E78.5 DYSLIPIDEMIA: ICD-10-CM

## 2024-06-14 DIAGNOSIS — I69.354 SPASTIC HEMIPLEGIA OF LEFT NONDOMINANT SIDE AS LATE EFFECT OF CEREBRAL INFARCTION (MULTI): ICD-10-CM

## 2024-06-14 DIAGNOSIS — I10 PRIMARY HYPERTENSION: ICD-10-CM

## 2024-06-14 DIAGNOSIS — Z79.4 TYPE 2 DIABETES MELLITUS WITH OTHER CIRCULATORY COMPLICATION, WITH LONG-TERM CURRENT USE OF INSULIN (MULTI): ICD-10-CM

## 2024-06-14 PROCEDURE — 3078F DIAST BP <80 MM HG: CPT | Performed by: PSYCHIATRY & NEUROLOGY

## 2024-06-14 PROCEDURE — 3060F POS MICROALBUMINURIA REV: CPT | Performed by: PSYCHIATRY & NEUROLOGY

## 2024-06-14 PROCEDURE — 1036F TOBACCO NON-USER: CPT | Performed by: PSYCHIATRY & NEUROLOGY

## 2024-06-14 PROCEDURE — 4010F ACE/ARB THERAPY RXD/TAKEN: CPT | Performed by: PSYCHIATRY & NEUROLOGY

## 2024-06-14 PROCEDURE — 3074F SYST BP LT 130 MM HG: CPT | Performed by: PSYCHIATRY & NEUROLOGY

## 2024-06-14 PROCEDURE — 99215 OFFICE O/P EST HI 40 MIN: CPT | Performed by: PSYCHIATRY & NEUROLOGY

## 2024-06-14 PROCEDURE — 99205 OFFICE O/P NEW HI 60 MIN: CPT | Performed by: PSYCHIATRY & NEUROLOGY

## 2024-06-14 ASSESSMENT — ACTIVITIES OF DAILY LIVING (ADL)
FEEDING: INDEPENDENT
BED_TO_CHAIR_AND_BACK: INDEPENDENT
TOILET_USE: INDEPENDENT (ON AND OFF, DRESSING, WIPING)
BLADDER: CONTINENT
STAIRS: INDEPENDENT
BATHING: INDEPENDENT (OR IN SHOWER)
BOWELS: INDEPENDENT (INCLUDING BUTTONS, ZIPS, LACES, ETC.)
GROOMING: INDEPENDENT FACE/HAIR/TEETH.SHAVING (IMPLEMENTS PROVIDED)
DRESSING: INDEPENDENT (INCLUDING BUTTONS, ZIPS, LACES,ETC.)
TOTAL_SCORE: 100
MOBILITY_LEVEL_SURFACES: INDEPENDENT (BUT MAY USE ANY AID FOR EXAMPLE, STICK) > 50 YARDS

## 2024-06-14 ASSESSMENT — PAIN SCALES - GENERAL: PAINLEVEL: 0-NO PAIN

## 2024-06-17 ENCOUNTER — TREATMENT (OUTPATIENT)
Dept: SPEECH THERAPY | Facility: CLINIC | Age: 64
End: 2024-06-17
Payer: COMMERCIAL

## 2024-06-17 ENCOUNTER — TREATMENT (OUTPATIENT)
Dept: PHYSICAL THERAPY | Facility: CLINIC | Age: 64
End: 2024-06-17
Payer: COMMERCIAL

## 2024-06-17 ENCOUNTER — TREATMENT (OUTPATIENT)
Dept: OCCUPATIONAL THERAPY | Facility: CLINIC | Age: 64
End: 2024-06-17
Payer: COMMERCIAL

## 2024-06-17 DIAGNOSIS — R26.89 IMPAIRMENT OF BALANCE: ICD-10-CM

## 2024-06-17 DIAGNOSIS — I63.9 WEAKNESS DUE TO ACUTE STROKE (MULTI): ICD-10-CM

## 2024-06-17 DIAGNOSIS — R26.9 ABNORMALITY OF GAIT AND MOBILITY: Primary | ICD-10-CM

## 2024-06-17 DIAGNOSIS — R53.1 WEAKNESS DUE TO ACUTE STROKE (MULTI): ICD-10-CM

## 2024-06-17 DIAGNOSIS — G81.94 LEFT HEMIPARESIS (MULTI): Primary | ICD-10-CM

## 2024-06-17 DIAGNOSIS — I69.322 DYSARTHRIA FOLLOWING CEREBROVASCULAR ACCIDENT: ICD-10-CM

## 2024-06-17 DIAGNOSIS — I69.319 COGNITIVE DEFICIT STATUS POST CEREBROVASCULAR ACCIDENT: ICD-10-CM

## 2024-06-17 PROCEDURE — 97112 NEUROMUSCULAR REEDUCATION: CPT | Mod: GP

## 2024-06-17 PROCEDURE — 97112 NEUROMUSCULAR REEDUCATION: CPT | Mod: GO,59

## 2024-06-17 PROCEDURE — 92507 TX SP LANG VOICE COMM INDIV: CPT | Mod: GN

## 2024-06-17 ASSESSMENT — PAIN - FUNCTIONAL ASSESSMENT
PAIN_FUNCTIONAL_ASSESSMENT: 0-10
PAIN_FUNCTIONAL_ASSESSMENT: 0-10

## 2024-06-17 ASSESSMENT — PAIN SCALES - GENERAL
PAINLEVEL_OUTOF10: 0 - NO PAIN
PAINLEVEL_OUTOF10: 0 - NO PAIN

## 2024-06-17 NOTE — PROGRESS NOTES
"Speech-Language Pathology    SLP Adult Outpatient Speech-Language Pathology Treatment     Patient Name: Elijah Chase \"Gene\"  MRN: [id number]  Today's Date: 2024  Time Calculation  Start Time: 0900  Stop Time: 945  Time Calculation (min): 45 min      Current Problem:   1. Dysarthria following cerebrovascular accident  Follow Up In Speech Therapy      2. Cognitive deficit status post cerebrovascular accident  Follow Up In Speech Therapy          SLP Assessment:  SLP TX Intervention Outcome: Making Progress Towards Goals  Treatment Provided: Yes, see Objective for details  Treatment Tolerance: Patient tolerated treatment well      Plan:  SLP TX Plan: Continue Plan of Care  SLP Frequency: 1x per week  Discussed POC: Patient  Discussed Risks/Benefits: Yes, Patient  Patient/Caregiver Agreeable: Yes      Subjective   Patient arrived to and attended treatment independently. Patient reported on Father's Day festivities with his daughters yesterday.       General Visit Information:  Number of Authorized Treatments : 30 visits combined ST/PT/OT ([facility])  Total Number of Visits : 3      Pain Assessment:   Pain Assessment: 0-10  Pain Score: 0 - No pain      Objective     GOALS: Start date: 2024; End/re-eval date: 2024  By discharge:  LT. Patient will independently and effectively communicate in valued conversations in the home and social settings using compensatory strategies as needed.  2. Patient will independently utilize compensatory strategies/tools for cognition in order to improve effectiveness of IADL management in the home, work, and community settings.     STGs:   1. Patient will utilize learned intelligibility enhancing strategies (e.g. reduced rate, over-articulation, adequate volume) during reading activities with >= 95% accuracy with minimal verbal reminders.  > ADDRESSED: Intelligibility enhancing compensatory strategies cont'd this date. SLP reviewed the following techniques: 1. Reduced rate of " speech/pausing/pacing; 2. Over-articulation/enunciation/distinct word boundaries; 3. Adequate volume for setting. Patient utilized strategies during a sentence reading activity with 90% accuracy given mild-moderate verbal cueing. Patient's volume was notably reduced during reading vs spontaneous speech. Vocal impairment perceptible.    2. Patient will utilize learned intelligibility enhancing strategies (e.g. reduced rate, over-articulation, adequate volume) during structured, spontaneous speech production activities with >= 90% accuracy with minimal verbal reminders.  > ADDRESSED: Discourse was addressed today. Patient with mildly reduced intelligibility conversationally, requiring SLP to request occasional repetition by patient for comprehension of message. He required mild-moderate prompting for use of compensatory strategies for speech for improved intelligibility.    3. Patient will independently utilize compensatory strategies/tools for memory, attention, and executive functioning with 90% accuracy in order to improve effectiveness of IADL management in the home, work, and community settings.  > NOT ADDRESSED.        Outpatient Education:  Individual(s) Educated: Patient  Verbal Education : Review of intelligibility enhancing strategies; HEP  Risk and Benefits Discussed with Patient/Caregiver/Other: yes  Patient/Caregiver Demonstrated Understanding: yes  Plan of Care Discussed and Agreed Upon: yes  Patient Response to Education: Patient/Caregiver Verbalized Understanding of Information, Patient/Caregiver Asked Appropriate Questions

## 2024-06-17 NOTE — PROGRESS NOTES
"Occupational Therapy    Occupational Therapy Treatment    Name: Elijah Chase \"Gene\"  MRN: [id number]  : 1960  Date: 24  Time Calculation  Start Time: 45  Stop Time: 0  Time Calculation (min): 45 min     OT Therapeutic Procedures Time Entry  Neuromuscular Re-Education Time Entry: 40                  Visit: 9  [facility]   24-24  30 OT/PT combined   Primary dx: L hemiparesis     Assessment:   Patient responded well to treatment session. Patient does demonstrate an increase in L hand motor control.     Plan:   Continue with skilled OT POC. Will re-assess next session.    Subjective   Patient agreeable to treatment session. Patient went to see Dr. De Paz on 24.    Pain Assessment:  Pain Assessment  Pain Assessment: 0-10  Pain Score: 0 - No pain  Precautions:  Fall risk    Objective    Tested 24:  L : 45#  L 9 hole peg test: 34 seconds    Neuro Re-ed:  40 mins  Completed closed chained movement for the LUE by completing Supercell bike with BUE's only. Completed 5 minutes forwards and 5 minutes backwards on level 3.0, RPM 48.       Completed the following to promote motor control of the LUE:  Standing, completed rolling a medium sized yellow theraband ball with tips of digits of L hand in a clockwise and counter clockwise way against vertical wall. Completed 3x each way  with focus on slow controlled movements of hand.    Patient completed functional mobility around therapy gym with air cast still applied to the LUE and holding a 1# hand weight to promote proprioceptive input and normalized movement pattern of the LUE. Completed 2 laps with cues for a bigger amplitude of L arm swing.    Completed fine motor coordination activity of picking up and manipulating small grooved pegs with the L hand to place into grooved peg holes. Completed 25 pegs in a good amount of time.    OP EDUCATION:   Continue with home program    Goals:  Active       OT Goals       HEP       Start:  24    Expected " End:  07/29/24       Will establish a LUE HEP which will include stretching, ROM, strengthening and coordination.         Strength       Start:  05/20/24    Expected End:  07/29/24       Patient will increase LUE strength by 1/2 muscle grade and L  strength by 5-10# for ADL/IADL tasks         Coordination       Start:  05/20/24    Expected End:  07/29/24       Patient will demonstrate an increase in L hand coordination as evident by completing 9 hole peg test in 35 seconds or less.

## 2024-06-17 NOTE — PROGRESS NOTES
Physical Therapy    Physical Therapy Treatment    Patient Name: Elijah Chase  MRN: [id number]  Today's Date: 6/17/2024  Time Calculation  Start Time: 1031  Stop Time: 1114  Time Calculation (min): 43 min     PT Therapeutic Procedures Time Entry  Neuromuscular Re-Education Time Entry: 43    Insurance: [facility] (30 vitis PT/OT)    Plan of Care: 5/20/24 - 8/18/24  Primary Diagnosis: Abnormal Gait and Mobility  Visit Number: 7    Assessment:  Patient completed treatment today which focused upon neuromuscular re-education to challenge balance and address the following biomechanical subcomponents of gait: stance, swing, propulsion, lateral stability. He requires CGA with obstacle course as he demonstrated several instances of instability requiring stepping or hip strategy to regain balance. Patient was challenged with forward and backward resisted monster walks as he continues to have  sensory-proprioceptive awareness deficits of LLE impacting foot placement. The lack of sensory-proprioceptive awareness also continues to limit safe functional mobility. Gene also continues to require max verbal cues to relax L UE from tonic flexion.    Plan: Continue per plan of care established on initial evaluation.          Current Problem:  1. Abnormality of gait and mobility  Follow Up In Physical Therapy      2. Weakness due to acute stroke (Multi)  Follow Up In Physical Therapy      3. Impairment of balance  Follow Up In Physical Therapy          Subjective   Patient states he is well and is walking >4000 steps per day.    Precautions   STEADI score of 6 (fall risk)    Pain   Denies pain.     Objective   Treatments:  Neuromuscular Re-Education   - Sit to stands x 20  - Walking Marches with 1-2 sec holds to focus on SLS stability 2 x 20 feet  - Lateral monster walks with green TB at ankles 3 x 12 feet d/b  - Forward and backward monster walks with green TB at ankles with HHA 2 x 12 feet  - Lunge onto Bosu ball with UL UE support x 15 R/L    *mirror for visual feedback and max cues for form  - Obstacle course (bosu ball, 2 shoe boxes, airex balance beam, and 6 river stones)        - fwd x 3 passes d/b and lateral x 1 passes d/b       - attempted cognitive dual task with obstacle course, however, pt only able to name 5-6 basketball teams throughout all    *seated and standing rest breaks provided as needed*    EDUCATION:  Access Code: [email or web address]  URL: [email or web address]  Date: 06/05/2024  Prepared by: Katt Banks    Exercises  - Sit to Stand  - 1 x daily - 7 x weekly - 3 sets - 10 reps  - Standing Hip Abduction with Counter Support  - 1 x daily - 7 x weekly - 3 sets - 10 reps  - Standing Hip Extension with Counter Support  - 1 x daily - 7 x weekly - 3 sets - 10 reps  - Standing March with Counter Support  - 1 x daily - 7 x weekly - 3 sets - 10 reps  - Heel Toe Raises with Counter Support  - 1 x daily - 7 x weekly - 3 sets - 10 reps  - Side Stepping with Resistance at Feet  - 1 x daily - 7 x weekly - 3 sets - 10 reps  - Carioca with Counter Support  - 1 x daily - 7 x weekly - 3 sets - 10 reps      Goals:  Active       PT Problem       PT Goal 1       Start:  05/20/24    Expected End:  08/18/24       Elijah Chase will complete TUG within 12 seconds or less (indicative of higher fall risk) in order to increase balance and enhance safety during ADLs/ IADLs.          PT Goal 2       Start:  05/20/24    Expected End:  08/18/24       Patient will perform 5xSTS test in </= 11 sec to demonstrate improved LE strength and decrease risk of falls         PT Goal 3       Start:  05/20/24    Expected End:  08/18/24       Patient will score >/= 20/30 on FGA to meet MDC for stroke population and demonstrate improvement in balance and functional mobility         PT Goal 4       Start:  05/20/24    Expected End:  08/18/24       Elijah Chase  will increase the 10MWT score by >/=0.16m/s (MCID) and > 0.8 m/s which is predictive of community ambulation post  stroke.   Gait speed on the 10MWT in regards to ambulation classification: </= 0.4 m/s household ambulator; 0.4 m/s-0.8 m/s limited community ambulation; > 0.8 m/s community ambulator)          PT Goal 5       Start:  05/20/24    Expected End:  08/18/24       Elijah Chase will demonstrate independence and report compliance with HEP to facilitate independent rehab program upon discharge.         Patient Stated Goal 1       Start:  05/20/24    Expected End:  08/18/24       Get stronger

## 2024-06-18 ENCOUNTER — LAB (OUTPATIENT)
Dept: LAB | Facility: LAB | Age: 64
End: 2024-06-18
Payer: COMMERCIAL

## 2024-06-18 ENCOUNTER — APPOINTMENT (OUTPATIENT)
Dept: PRIMARY CARE | Facility: CLINIC | Age: 64
End: 2024-06-18
Payer: COMMERCIAL

## 2024-06-18 VITALS
BODY MASS INDEX: 25.99 KG/M2 | HEART RATE: 56 BPM | WEIGHT: 176 LBS | TEMPERATURE: 97 F | SYSTOLIC BLOOD PRESSURE: 103 MMHG | DIASTOLIC BLOOD PRESSURE: 65 MMHG | OXYGEN SATURATION: 98 %

## 2024-06-18 DIAGNOSIS — I63.9 WEAKNESS DUE TO ACUTE STROKE (MULTI): ICD-10-CM

## 2024-06-18 DIAGNOSIS — I69.354 SPASTIC HEMIPLEGIA OF LEFT NONDOMINANT SIDE AS LATE EFFECT OF CEREBRAL INFARCTION (MULTI): ICD-10-CM

## 2024-06-18 DIAGNOSIS — Z12.5 SCREENING PSA (PROSTATE SPECIFIC ANTIGEN): Primary | ICD-10-CM

## 2024-06-18 DIAGNOSIS — I69.322 DYSARTHRIA FOLLOWING CEREBROVASCULAR ACCIDENT: ICD-10-CM

## 2024-06-18 DIAGNOSIS — Z12.5 SCREENING PSA (PROSTATE SPECIFIC ANTIGEN): ICD-10-CM

## 2024-06-18 DIAGNOSIS — I10 PRIMARY HYPERTENSION: ICD-10-CM

## 2024-06-18 DIAGNOSIS — R53.1 WEAKNESS DUE TO ACUTE STROKE (MULTI): ICD-10-CM

## 2024-06-18 DIAGNOSIS — E11.9 TYPE 2 DIABETES MELLITUS WITHOUT COMPLICATION, WITHOUT LONG-TERM CURRENT USE OF INSULIN (MULTI): ICD-10-CM

## 2024-06-18 DIAGNOSIS — E78.49 OTHER HYPERLIPIDEMIA: ICD-10-CM

## 2024-06-18 DIAGNOSIS — Z79.4 TYPE 2 DIABETES MELLITUS WITH OTHER CIRCULATORY COMPLICATION, WITH LONG-TERM CURRENT USE OF INSULIN (MULTI): Primary | ICD-10-CM

## 2024-06-18 DIAGNOSIS — R35.0 URINARY FREQUENCY: ICD-10-CM

## 2024-06-18 DIAGNOSIS — Z79.4 TYPE 2 DIABETES MELLITUS WITH OTHER CIRCULATORY COMPLICATION, WITH LONG-TERM CURRENT USE OF INSULIN (MULTI): ICD-10-CM

## 2024-06-18 DIAGNOSIS — E11.59 TYPE 2 DIABETES MELLITUS WITH OTHER CIRCULATORY COMPLICATION, WITH LONG-TERM CURRENT USE OF INSULIN (MULTI): ICD-10-CM

## 2024-06-18 DIAGNOSIS — I63.511 CEREBROVASCULAR ACCIDENT (CVA) DUE TO STENOSIS OF RIGHT MIDDLE CEREBRAL ARTERY (MULTI): ICD-10-CM

## 2024-06-18 DIAGNOSIS — E11.59 TYPE 2 DIABETES MELLITUS WITH OTHER CIRCULATORY COMPLICATION, WITH LONG-TERM CURRENT USE OF INSULIN (MULTI): Primary | ICD-10-CM

## 2024-06-18 DIAGNOSIS — I69.319 COGNITIVE DEFICIT STATUS POST CEREBROVASCULAR ACCIDENT: ICD-10-CM

## 2024-06-18 PROBLEM — E78.5 DYSLIPIDEMIA: Status: ACTIVE | Noted: 2024-04-08

## 2024-06-18 PROCEDURE — 80053 COMPREHEN METABOLIC PANEL: CPT

## 2024-06-18 PROCEDURE — 3078F DIAST BP <80 MM HG: CPT | Performed by: FAMILY MEDICINE

## 2024-06-18 PROCEDURE — 1036F TOBACCO NON-USER: CPT | Performed by: FAMILY MEDICINE

## 2024-06-18 PROCEDURE — 80061 LIPID PANEL: CPT

## 2024-06-18 PROCEDURE — 84153 ASSAY OF PSA TOTAL: CPT

## 2024-06-18 PROCEDURE — 83036 HEMOGLOBIN GLYCOSYLATED A1C: CPT

## 2024-06-18 PROCEDURE — 87086 URINE CULTURE/COLONY COUNT: CPT

## 2024-06-18 PROCEDURE — 36415 COLL VENOUS BLD VENIPUNCTURE: CPT

## 2024-06-18 PROCEDURE — 99214 OFFICE O/P EST MOD 30 MIN: CPT | Performed by: FAMILY MEDICINE

## 2024-06-18 PROCEDURE — 3048F LDL-C <100 MG/DL: CPT | Performed by: FAMILY MEDICINE

## 2024-06-18 PROCEDURE — 3074F SYST BP LT 130 MM HG: CPT | Performed by: FAMILY MEDICINE

## 2024-06-18 PROCEDURE — 3060F POS MICROALBUMINURIA REV: CPT | Performed by: FAMILY MEDICINE

## 2024-06-18 PROCEDURE — 4010F ACE/ARB THERAPY RXD/TAKEN: CPT | Performed by: FAMILY MEDICINE

## 2024-06-18 PROCEDURE — 3044F HG A1C LEVEL LT 7.0%: CPT | Performed by: FAMILY MEDICINE

## 2024-06-18 RX ORDER — UBIDECARENONE 75 MG
500 CAPSULE ORAL DAILY
Start: 2024-06-18 | End: 2025-06-18

## 2024-06-18 RX ORDER — DEXTROSE 4 G
TABLET,CHEWABLE ORAL
Qty: 1 EACH | Refills: 0 | Status: SHIPPED | OUTPATIENT
Start: 2024-06-18

## 2024-06-18 ASSESSMENT — ENCOUNTER SYMPTOMS
OCCASIONAL FEELINGS OF UNSTEADINESS: 0
DEPRESSION: 0
LOSS OF SENSATION IN FEET: 0

## 2024-06-18 ASSESSMENT — PAIN SCALES - GENERAL: PAINLEVEL: 0-NO PAIN

## 2024-06-18 ASSESSMENT — LIFESTYLE VARIABLES
HOW OFTEN DO YOU HAVE SIX OR MORE DRINKS ON ONE OCCASION: NEVER
SKIP TO QUESTIONS 9-10: 1
HOW MANY STANDARD DRINKS CONTAINING ALCOHOL DO YOU HAVE ON A TYPICAL DAY: PATIENT DOES NOT DRINK
AUDIT-C TOTAL SCORE: 0
HOW OFTEN DO YOU HAVE A DRINK CONTAINING ALCOHOL: NEVER

## 2024-06-18 NOTE — PROGRESS NOTES
"Subjective   Patient ID: Elijah Chase \"Gene\" is a 63 y.o. male who presents for Follow-up.    HPI  Accompanied by his daughter today.    HTN: followed by Dr. Jeff, cardiology as well.  BP today with good control  Denies CP, SOB, Edema, palpitations or headache.     HYPERLIPIDEMIA:  Patient is tolerating regular statin dosing without muscle achiness or weakness.  Due for fasting labs.    DM TYPE 2:  DIABETES PREVENTATIVE CARE:  Fasting Labs:      [facility]  Foot Exam:   Hba1c:   Hemoglobin A1C (%)   Date Value   06/18/2024 5.7 (H)     Microalbumin:   Albumin (g/dL)   Date Value   06/18/2024 4.6        CVA: 3/8/2024  Followed by Dr. Lali De Paz  No recent changes to medications.    RECURRING UTI: has appointment with urology 7/1/2024    Review of Systems    Review of Systems negative except as noted in HPI and Chief complaint.     Objective             VITALS:  /65 (BP Location: Left arm, Patient Position: Sitting, BP Cuff Size: Adult long)   Pulse 56   Temp 36.1 °C (97 °F) (Temporal)   Wt 79.8 kg (176 lb)   SpO2 98%   BMI 25.99 kg/m²      Physical Exam  Constitutional:       General: He is not in acute distress.     Appearance: Normal appearance. He is not ill-appearing.   HENT:      Head: Normocephalic and atraumatic.   Neck:      Vascular: No carotid bruit.   Cardiovascular:      Rate and Rhythm: Normal rate and regular rhythm.      Pulses: Normal pulses.      Heart sounds: Normal heart sounds. No murmur heard.     No gallop.   Pulmonary:      Effort: Pulmonary effort is normal.      Breath sounds: Normal breath sounds. No wheezing, rhonchi or rales.   Musculoskeletal:      Cervical back: Normal range of motion and neck supple. No rigidity or tenderness.   Lymphadenopathy:      Cervical: No cervical adenopathy.   Skin:     General: Skin is warm and dry.   Neurological:      Mental Status: He is alert.   Psychiatric:         Mood and Affect: Mood normal.         Behavior: Behavior normal. "         Assessment/Plan   Problem List Items Addressed This Visit       Weakness due to acute stroke (Multi)     Improving.  Continue with PT and OT.  Follow up 6 months, sooner with any problems or concerns.         Relevant Orders    Referral to Occupational Therapy    Referral to Occupational Therapy    Primary hypertension     BP controlled today.  Continue medications per cardiology - follow up appointments reviewed.  Follow up 6 months.         Relevant Orders    Referral to Cardiology    Cognitive deficit status post cerebrovascular accident     Unchanged - continue with OT and PT.  Daughter has his schedule and medications well managed.         Relevant Medications    cyanocobalamin (B-12 DOTS) 500 mcg tablet    Dysarthria following cerebrovascular accident    Relevant Orders    Referral to Occupational Therapy    Referral to Occupational Therapy    Spastic hemiplegia of left nondominant side as late effect of cerebral infarction (Multi)     Improving.  Continue with PT and OT.  Follow up 6 months.         Relevant Orders    Referral to Occupational Therapy    Referral to Occupational Therapy    Type 2 diabetes mellitus with circulatory disorder, with long-term current use of insulin (Multi) - Primary     Rechecking Hba1c - will call with results and adjust medications accordingly.  May hold Lantus pending review of above with report of low glucose readings intermittently.  Follow up 3 months.         Relevant Medications    blood-glucose meter misc    blood sugar diagnostic strip    Other Relevant Orders    Referral to Ophthalmology    Referral to Cardiology    Cerebrovascular accident (CVA) due to stenosis of right middle cerebral artery (Multi)     Stable - follow up with neurology as scheduled.  No changes in medications at this time.  Follow up at least annually with neurology.         Relevant Orders    Referral to Occupational Therapy    Referral to Occupational Therapy     Other Visit Diagnoses        Urinary frequency        Relevant Orders    Urine Culture (Completed)    Screening PSA (prostate specific antigen)        Relevant Orders    Prostate Specific Antigen, Screen (Completed)            FOLLOW UP 3 MONTHS, SOONER WITH ANY PROBLEMS OR CONCERNS.

## 2024-06-19 ENCOUNTER — TREATMENT (OUTPATIENT)
Dept: OCCUPATIONAL THERAPY | Facility: CLINIC | Age: 64
End: 2024-06-19
Payer: COMMERCIAL

## 2024-06-19 ENCOUNTER — TREATMENT (OUTPATIENT)
Dept: PHYSICAL THERAPY | Facility: CLINIC | Age: 64
End: 2024-06-19
Payer: COMMERCIAL

## 2024-06-19 DIAGNOSIS — R26.9 ABNORMALITY OF GAIT AND MOBILITY: Primary | ICD-10-CM

## 2024-06-19 DIAGNOSIS — G81.94 LEFT HEMIPARESIS (MULTI): Primary | ICD-10-CM

## 2024-06-19 DIAGNOSIS — I63.9 WEAKNESS DUE TO ACUTE STROKE (MULTI): ICD-10-CM

## 2024-06-19 DIAGNOSIS — R53.1 WEAKNESS DUE TO ACUTE STROKE (MULTI): ICD-10-CM

## 2024-06-19 DIAGNOSIS — R26.89 IMPAIRMENT OF BALANCE: ICD-10-CM

## 2024-06-19 LAB
ALBUMIN SERPL BCP-MCNC: 4.6 G/DL (ref 3.4–5)
ALP SERPL-CCNC: 96 U/L (ref 33–136)
ALT SERPL W P-5'-P-CCNC: 14 U/L (ref 10–52)
ANION GAP SERPL CALC-SCNC: 13 MMOL/L (ref 10–20)
AST SERPL W P-5'-P-CCNC: 18 U/L (ref 9–39)
BACTERIA UR CULT: NORMAL
BILIRUB SERPL-MCNC: 0.6 MG/DL (ref 0–1.2)
BUN SERPL-MCNC: 19 MG/DL (ref 6–23)
CALCIUM SERPL-MCNC: 9.8 MG/DL (ref 8.6–10.6)
CHLORIDE SERPL-SCNC: 101 MMOL/L (ref 98–107)
CHOLEST SERPL-MCNC: 115 MG/DL (ref 0–199)
CHOLESTEROL/HDL RATIO: 2.9
CO2 SERPL-SCNC: 27 MMOL/L (ref 21–32)
CREAT SERPL-MCNC: 0.73 MG/DL (ref 0.5–1.3)
EGFRCR SERPLBLD CKD-EPI 2021: >90 ML/MIN/1.73M*2
EST. AVERAGE GLUCOSE BLD GHB EST-MCNC: 117 MG/DL
GLUCOSE SERPL-MCNC: 85 MG/DL (ref 74–99)
HBA1C MFR BLD: 5.7 %
HDLC SERPL-MCNC: 39.6 MG/DL
LDLC SERPL CALC-MCNC: 66 MG/DL
NON HDL CHOLESTEROL: 75 MG/DL (ref 0–149)
POTASSIUM SERPL-SCNC: 4.2 MMOL/L (ref 3.5–5.3)
PROT SERPL-MCNC: 7.2 G/DL (ref 6.4–8.2)
PSA SERPL-MCNC: 0.96 NG/ML
SODIUM SERPL-SCNC: 137 MMOL/L (ref 136–145)
TRIGL SERPL-MCNC: 48 MG/DL (ref 0–149)
VLDL: 10 MG/DL (ref 0–40)

## 2024-06-19 PROCEDURE — 97112 NEUROMUSCULAR REEDUCATION: CPT | Mod: GO

## 2024-06-19 PROCEDURE — 97112 NEUROMUSCULAR REEDUCATION: CPT | Mod: GP

## 2024-06-19 PROCEDURE — 97110 THERAPEUTIC EXERCISES: CPT | Mod: GO

## 2024-06-19 ASSESSMENT — PAIN SCALES - GENERAL: PAINLEVEL_OUTOF10: 0 - NO PAIN

## 2024-06-19 ASSESSMENT — PAIN - FUNCTIONAL ASSESSMENT: PAIN_FUNCTIONAL_ASSESSMENT: 0-10

## 2024-06-19 NOTE — PROGRESS NOTES
"Occupational Therapy    Occupational Therapy Treatment    Name: Elijah Chase \"Gene\"  MRN: [id number]  : 1960  Date: 24  Time Calculation  Start Time: 0945  Stop Time: 1030  Time Calculation (min): 45 min     OT Therapeutic Procedures Time Entry  Neuromuscular Re-Education Time Entry: 15  Therapeutic Exercise Time Entry: 25                  Visit: 10  [facility]   24-24  30 OT/PT combined   Primary dx: L hemiparesis     Assessment:   Patient responded well to course of OT. Patient has made great functional gains in regards to the LUE ( see objective section.) He reports can complete his daily functional activities independently.   Patient in agreement to continue with HEP after discharge.    I messaged patient's PCP,  and she agreed a Functional Capacity Evaluation and Driving Evaluation would be the next appropriate steps for the patient.     Plan:   Patient met all OT goals. Patient in agreement with discharge today. Recommended the above evaluations going forward.    Subjective   Patient agreeable to treatment session. Patient went to see PCP yesterday.   \"Can we go over the bands one more time so I make sure I am doing them right?\"    Pain Assessment:  Pain Assessment  Pain Assessment: 0-10  0-10 (Numeric) Pain Score: 0 - No pain  Precautions:  Fall risk    Objective      24:  LUE: shoulder flexion: 4-/5, elbow flexion: 4-5, wrist extension: 4-/5  : 50#  9 hole peg test: 31 seconds     Therapeutic Exercise: 25 mins  Patient completed green theraband exercises to increase UE strength, 2 sets of 10: shoulder extension, rows, external rotation, internal rotation, elbow flexion, elbow extension. Cues given for proper technique    Neuro Re-ed: 15 mins  Completed the following activities to promote hand-eye coordination, reaction time, and multi-tasking for the LUE:   -Bouncing a small racket ball. Completed in sitting, bouncing with just the L hand, then L<>R hand. Patient then stood to " complete same activity, and then with walking around therapy gym. Required occasional verbal cues to slow down. Patient did not drop ball, however occasional dragging of the LLE.    OP EDUCATION:   Continue with home program after discharge  Gave patient numbers to different facilities to call for FCE and 's Eval    Goals:  Resolved       OT Goals       HEP (Met)       Start:  05/20/24    Expected End:  07/29/24    Resolved:  06/19/24    Will establish a LUE HEP which will include stretching, ROM, strengthening and coordination.         Strength (Met)       Start:  05/20/24    Expected End:  07/29/24    Resolved:  06/19/24    Patient will increase LUE strength by 1/2 muscle grade and L  strength by 5-10# for ADL/IADL tasks         Coordination (Met)       Start:  05/20/24    Expected End:  07/29/24    Resolved:  06/19/24    Patient will demonstrate an increase in L hand coordination as evident by completing 9 hole peg test in 35 seconds or less.

## 2024-06-19 NOTE — PROGRESS NOTES
Physical Therapy    Physical Therapy Treatment    Patient Name: Elijah Chase  MRN: [id number]  Today's Date: 6/19/2024  Time Calculation  Start Time: 1035  Stop Time: 1115  Time Calculation (min): 40 min     PT Therapeutic Procedures Time Entry  Neuromuscular Re-Education Time Entry: 38    Insurance: [facility] (30 vitis PT/OT)    Plan of Care: 5/20/24 - 8/18/24  Primary Diagnosis: Abnormal Gait and Mobility  Visit Number: 8    Assessment:  Patient was challenged by obstacle course with dual tasking conversation re football, Gene was able to complete the obstacle course forward and lateral at SBA to  with mild instability ethan with lateral stepping on river stones and around cones d/t LE coordination and motor planning.  He demonstrates increased time to complete obstacle course with dual task (general conversation) as he gets off task and stops in order to think/recall information. Patient was encouraged to speak load and clear to focus on his speech and allow carry-over. Updated home program was issued to focus on strength, balance, and coordination in order to continue to improve functional mobility. Discharge planning was also discussed with patient and tentative plan is to reserve some visits for later in the year and discharge in next 2 visits.    Plan: Continue per plan of care established on initial evaluation. Focus on dual tasking and dynamic balance         Current Problem:  1. Abnormality of gait and mobility  Follow Up In Physical Therapy      2. Weakness due to acute stroke (Multi)  Follow Up In Physical Therapy      3. Impairment of balance  Follow Up In Physical Therapy            Subjective   Patient states he is well and is walking 4,500 steps per day.    Precautions   STEADI score of 6 (fall risk)    Pain   Denies pain.     Objective   Treatments:  Neuromuscular Re-Education   - Discussed discharge planning as patient with 30 combined PT/OT/ST visits per year. Patient expressed understanding of  "conserving some visits for later in the year if something were to occur.  - Sit to stands with med ball press x 20  - Obstacle course (long airex, 4 river stones, 2 hurdles, yoga mat with rolled towels and 2 river stones underneath, and 3 cones)  - fwd x 3 passes d/b and lateral x 2 passes d/b with cognitive dual tasking re football       conversation with therapist  - pt picked up all objects in obstacle course and returned to proper location in clinic  - Diagonal Step Up Coordination onto 8\" step 2 x 12   - Issued updated home program to allow for carry-over and continued self management of functional mobility    *seated and standing rest breaks provided as needed*    EDUCATION:  Access Code: [email or web address]  URL: [email or web address]  Date: 06/19/2024  Prepared by: Katt Banks    Exercises  - Sit to Stand  - 1 x daily - 7 x weekly - 3 sets - 10 reps  - Standing Hip Abduction with Counter Support  - 1 x daily - 7 x weekly - 3 sets - 10 reps  - Standing Hip Extension with Counter Support  - 1 x daily - 7 x weekly - 3 sets - 10 reps  - Standing March with Counter Support  - 1 x daily - 7 x weekly - 3 sets - 10 reps  - Heel Toe Raises with Counter Support  - 1 x daily - 7 x weekly - 3 sets - 10 reps  - Lunge with Counter Support  - 1 x daily - 7 x weekly - 3 sets - 10 reps  - Side Stepping with Resistance at Feet  - 1 x daily - 7 x weekly - 3 sets - 10 reps  - Braided Sidestepping with Arms Out  - 1 x daily - 7 x weekly - 3 sets - 10 reps  - Forward and Backward Monster Walk with Resistance at Ankles and Counter Support  - 1 x daily - 7 x weekly - 3 sets - 10 reps  - Diagonal Step Up Coordination  - 1 x daily - 7 x weekly - 3 sets - 10 reps      Goals:  Active       PT Problem       PT Goal 1       Start:  05/20/24    Expected End:  08/18/24       Elijah Chase will complete TUG within 12 seconds or less (indicative of higher fall risk) in order to increase balance and enhance safety during ADLs/ IADLs.          " PT Goal 2       Start:  05/20/24    Expected End:  08/18/24       Patient will perform 5xSTS test in </= 11 sec to demonstrate improved LE strength and decrease risk of falls         PT Goal 3       Start:  05/20/24    Expected End:  08/18/24       Patient will score >/= 20/30 on FGA to meet MDC for stroke population and demonstrate improvement in balance and functional mobility         PT Goal 4       Start:  05/20/24    Expected End:  08/18/24       Elijah Chase  will increase the 10MWT score by >/=0.16m/s (MCID) and > 0.8 m/s which is predictive of community ambulation post stroke.   Gait speed on the 10MWT in regards to ambulation classification: </= 0.4 m/s household ambulator; 0.4 m/s-0.8 m/s limited community ambulation; > 0.8 m/s community ambulator)          PT Goal 5       Start:  05/20/24    Expected End:  08/18/24       Elijah Chase will demonstrate independence and report compliance with HEP to facilitate independent rehab program upon discharge.         Patient Stated Goal 1       Start:  05/20/24    Expected End:  08/18/24       Get stronger

## 2024-06-21 ENCOUNTER — TREATMENT (OUTPATIENT)
Dept: PHYSICAL THERAPY | Facility: CLINIC | Age: 64
End: 2024-06-21
Payer: COMMERCIAL

## 2024-06-21 ENCOUNTER — TREATMENT (OUTPATIENT)
Dept: SPEECH THERAPY | Facility: CLINIC | Age: 64
End: 2024-06-21
Payer: COMMERCIAL

## 2024-06-21 DIAGNOSIS — I69.319 COGNITIVE DEFICIT STATUS POST CEREBROVASCULAR ACCIDENT: ICD-10-CM

## 2024-06-21 DIAGNOSIS — I69.322 DYSARTHRIA FOLLOWING CEREBROVASCULAR ACCIDENT: ICD-10-CM

## 2024-06-21 DIAGNOSIS — I63.9 WEAKNESS DUE TO ACUTE STROKE (MULTI): ICD-10-CM

## 2024-06-21 DIAGNOSIS — R53.1 WEAKNESS DUE TO ACUTE STROKE (MULTI): ICD-10-CM

## 2024-06-21 DIAGNOSIS — R26.9 ABNORMALITY OF GAIT AND MOBILITY: ICD-10-CM

## 2024-06-21 DIAGNOSIS — R26.89 IMPAIRMENT OF BALANCE: ICD-10-CM

## 2024-06-21 PROCEDURE — 97130 THER IVNTJ EA ADDL 15 MIN: CPT | Mod: GN

## 2024-06-21 PROCEDURE — 97129 THER IVNTJ 1ST 15 MIN: CPT | Mod: GN

## 2024-06-21 PROCEDURE — 97116 GAIT TRAINING THERAPY: CPT | Mod: GP | Performed by: PHYSICAL THERAPIST

## 2024-06-21 PROCEDURE — 97112 NEUROMUSCULAR REEDUCATION: CPT | Mod: GP | Performed by: PHYSICAL THERAPIST

## 2024-06-21 ASSESSMENT — PAIN SCALES - GENERAL: PAINLEVEL_OUTOF10: 0 - NO PAIN

## 2024-06-21 ASSESSMENT — PAIN - FUNCTIONAL ASSESSMENT: PAIN_FUNCTIONAL_ASSESSMENT: 0-10

## 2024-06-21 NOTE — PROGRESS NOTES
Physical Therapy    Physical Therapy Treatment    Patient Name: Elijah Chase  MRN: [id number]  Today's Date: 6/21/2024  Time Calculation  Start Time: 0950  Stop Time: 1035  Time Calculation (min): 45 min     PT Therapeutic Procedures Time Entry  Neuromuscular Re-Education Time Entry: 25  Gait Training Time Entry: 20    Insurance: [facility] (30 vitis PT/OT)    Plan of Care: 5/20/24 - 8/18/24  Primary Diagnosis: Abnormal Gait and Mobility  Visit Number: 9    Assessment:  Patient has difficulty with dual motor and cognitive tasks. Some of this is due to difficulty multitasking, but a majority of his errors stem from strictly cognitive impairment.  This is evident because he struggles to perform fairly simple cognitive tasks without a dual motor task when seated in a chair.   Although there is foot drag during outdoor ambulation in uneven grass, he had no LOB or major safety concerns. He continues to demonstrate reduced sensory-motor control of LUE and LLE. Spastic posturing of RUE is noted during motor challenges.     Plan: Continue per plan of care established on initial evaluation. Focus on dual tasking and dynamic balance       Primary Dx: Abnormality of gait and mobility  Current Problem:  1. Weakness due to acute stroke (Multi)  Follow Up In Physical Therapy      2. Abnormality of gait and mobility  Follow Up In Physical Therapy      3. Impairment of balance  Follow Up In Physical Therapy            Subjective   Patient states he is well and is walking 4,500 steps per day.    Precautions   STEADI score of 6 (fall risk)    Pain   Denies pain.     Objective   Treatments:  Neuromuscular Re-Education:  - left heel taps to targets on trampoline w/ 3 lb ankle wt. Dual cognitive tasks incorporated  - sidestepping each way and tapping cones of different colors. Cognitive task with identification and memory.   - half braiding without UE support, SBA. Few slight LOB due to left foot getting caught on right  - gait over stepping  stones naming objects with each step    Gait training:  Ambulated outdoors over uneven grass, slopes, mulch, curbs, with cane. 20 minutes total  Incoorporated dual cognitive task while ambulating outdoors. Counting by 3s, naming fruits/veg, naming football teams  *seated and standing rest breaks provided as needed*    EDUCATION:  Access Code: [email or web address]  URL: [email or web address]  Date: 06/19/2024  Prepared by: Katt Banks    Exercises  - Sit to Stand  - 1 x daily - 7 x weekly - 3 sets - 10 reps  - Standing Hip Abduction with Counter Support  - 1 x daily - 7 x weekly - 3 sets - 10 reps  - Standing Hip Extension with Counter Support  - 1 x daily - 7 x weekly - 3 sets - 10 reps  - Standing March with Counter Support  - 1 x daily - 7 x weekly - 3 sets - 10 reps  - Heel Toe Raises with Counter Support  - 1 x daily - 7 x weekly - 3 sets - 10 reps  - Lunge with Counter Support  - 1 x daily - 7 x weekly - 3 sets - 10 reps  - Side Stepping with Resistance at Feet  - 1 x daily - 7 x weekly - 3 sets - 10 reps  - Braided Sidestepping with Arms Out  - 1 x daily - 7 x weekly - 3 sets - 10 reps  - Forward and Backward Monster Walk with Resistance at Ankles and Counter Support  - 1 x daily - 7 x weekly - 3 sets - 10 reps  - Diagonal Step Up Coordination  - 1 x daily - 7 x weekly - 3 sets - 10 reps      Goals:  Active       PT Problem       PT Goal 1       Start:  05/20/24    Expected End:  08/18/24       Elijah Chase will complete TUG within 12 seconds or less (indicative of higher fall risk) in order to increase balance and enhance safety during ADLs/ IADLs.          PT Goal 2       Start:  05/20/24    Expected End:  08/18/24       Patient will perform 5xSTS test in </= 11 sec to demonstrate improved LE strength and decrease risk of falls         PT Goal 3       Start:  05/20/24    Expected End:  08/18/24       Patient will score >/= 20/30 on FGA to meet MDC for stroke population and demonstrate improvement in balance  and functional mobility         PT Goal 4       Start:  05/20/24    Expected End:  08/18/24       Elijah Chase  will increase the 10MWT score by >/=0.16m/s (MCID) and > 0.8 m/s which is predictive of community ambulation post stroke.   Gait speed on the 10MWT in regards to ambulation classification: </= 0.4 m/s household ambulator; 0.4 m/s-0.8 m/s limited community ambulation; > 0.8 m/s community ambulator)          PT Goal 5       Start:  05/20/24    Expected End:  08/18/24       Elijah Chase will demonstrate independence and report compliance with HEP to facilitate independent rehab program upon discharge.         Patient Stated Goal 1       Start:  05/20/24    Expected End:  08/18/24       Get stronger

## 2024-06-21 NOTE — PROGRESS NOTES
"Speech-Language Pathology    SLP Adult Outpatient Speech-Language Pathology Treatment     Patient Name: Elijah Chase \"Gene\"  MRN: [id number]  Today's Date: 2024  Time Calculation  Start Time: 09  Stop Time: 945  Time Calculation (min): 45 min      Current Problem:   1. Dysarthria following cerebrovascular accident  Follow Up In Speech Therapy      2. Cognitive deficit status post cerebrovascular accident  Follow Up In Speech Therapy          SLP Assessment:  SLP TX Intervention Outcome: Making Progress Towards Goals  Treatment Provided: Yes, see Objective for details  Treatment Tolerance: Patient tolerated treatment well      Plan:  SLP TX Plan: Continue Plan of Care  SLP Frequency: 1x per week  Discussed POC: Patient  Discussed Risks/Benefits: Yes, Patient  Patient/Caregiver Agreeable: Yes      Subjective   Patient arrived to and attended treatment independently. Since last visit, patient was discharged from OT and is continuing with PT.      General Visit Information:  Number of Authorized Treatments : 30 visits combined ST/PT/OT ([facility])  Total Number of Visits : 4      Pain Assessment:  Pain Assessment: 0-10  Pain Score: 0 - No pain      Objective     GOALS: Start date: 2024; End/re-eval date: 2024  By discharge:  LT. Patient will independently and effectively communicate in valued conversations in the home and social settings using compensatory strategies as needed.  2. Patient will independently utilize compensatory strategies/tools for cognition in order to improve effectiveness of IADL management in the home, work, and community settings.     STGs:   1. Patient will utilize learned intelligibility enhancing strategies (e.g. reduced rate, over-articulation, adequate volume) during reading activities with >= 95% accuracy with minimal verbal reminders.  > NOT ADDRESSED.    2. Patient will utilize learned intelligibility enhancing strategies (e.g. reduced rate, over-articulation, adequate " volume) during structured, spontaneous speech production activities with >= 90% accuracy with minimal verbal reminders.  > NOT ADDRESSED. At the spontaneous discourse level, patient with mild-moderately reduced intelligibility, requiring SLP to request occasional repetition by patient for comprehension of message.     3. Patient will independently utilize compensatory strategies/tools for memory, attention, and executive functioning with 90% accuracy in order to improve effectiveness of IADL management in the home, work, and community settings.  > ADDRESSED: Patient has been demonstrating difficulty with dual processing tasks while in PT sessions. Selective and divided attention tasks were addressed today. Patient with mild-moderate difficulty performing tasks, improving as the session progressed. Patient with most difficulty on divided attention task during which he had to organize provided items while listening to information on a podcast. He recalled few details from the information heard although performed well on the organization portion of the activity. Patient provided details about how attention to detail is important at his place of work.        Outpatient Education:  Individual(s) Educated: Patient  Verbal Education : Levels of attention  Risk and Benefits Discussed with Patient/Caregiver/Other: yes  Patient/Caregiver Demonstrated Understanding: yes  Plan of Care Discussed and Agreed Upon: yes  Patient Response to Education: Patient/Caregiver Verbalized Understanding of Information, Patient/Caregiver Asked Appropriate Questions

## 2024-06-24 ENCOUNTER — TREATMENT (OUTPATIENT)
Dept: PHYSICAL THERAPY | Facility: CLINIC | Age: 64
End: 2024-06-24
Payer: COMMERCIAL

## 2024-06-24 ENCOUNTER — TREATMENT (OUTPATIENT)
Dept: SPEECH THERAPY | Facility: CLINIC | Age: 64
End: 2024-06-24
Payer: COMMERCIAL

## 2024-06-24 DIAGNOSIS — I69.319 COGNITIVE DEFICIT STATUS POST CEREBROVASCULAR ACCIDENT: ICD-10-CM

## 2024-06-24 DIAGNOSIS — R53.1 WEAKNESS DUE TO ACUTE STROKE (MULTI): ICD-10-CM

## 2024-06-24 DIAGNOSIS — I69.322 DYSARTHRIA FOLLOWING CEREBROVASCULAR ACCIDENT: ICD-10-CM

## 2024-06-24 DIAGNOSIS — R26.9 ABNORMALITY OF GAIT AND MOBILITY: Primary | ICD-10-CM

## 2024-06-24 DIAGNOSIS — R26.89 IMPAIRMENT OF BALANCE: ICD-10-CM

## 2024-06-24 DIAGNOSIS — I63.9 WEAKNESS DUE TO ACUTE STROKE (MULTI): ICD-10-CM

## 2024-06-24 PROCEDURE — 97129 THER IVNTJ 1ST 15 MIN: CPT | Mod: GN

## 2024-06-24 PROCEDURE — 97130 THER IVNTJ EA ADDL 15 MIN: CPT | Mod: GN

## 2024-06-24 PROCEDURE — 97530 THERAPEUTIC ACTIVITIES: CPT | Mod: GP

## 2024-06-24 PROCEDURE — 97116 GAIT TRAINING THERAPY: CPT | Mod: GP

## 2024-06-24 ASSESSMENT — PAIN - FUNCTIONAL ASSESSMENT: PAIN_FUNCTIONAL_ASSESSMENT: 0-10

## 2024-06-24 ASSESSMENT — PAIN SCALES - GENERAL: PAINLEVEL_OUTOF10: 0 - NO PAIN

## 2024-06-24 NOTE — PROGRESS NOTES
Physical Therapy    Physical Therapy Treatment    Patient Name: Elijah Chase  MRN: [id number]  Today's Date: 6/24/2024  Time Calculation  Start Time: 0945  Stop Time: 1020  Time Calculation (min): 35 min     PT Therapeutic Procedures Time Entry  Gait Training Time Entry: 15  Therapeutic Activity Time Entry: 20    Insurance: [facility] (30 visits PT/OT/ST)    Plan of Care: 5/20/24 - 8/18/24  Primary Diagnosis: Abnormal Gait and Mobility  Visit Number: 10    Assessment:  Elijah Chase has completed 10 sessions of physical therapy treatment with emphasis upon gait, strength, and balance training. He demonstrates improved B LE strength as demonstrated by 5xSTS test time at 12.5 sec which is near his age range norm. He has improved his TUG time to 9.4 sec without device and 10MWT to 9.64 sec demonstrating a 1.0 m/s gait speed without device. He is demonstrating improvement in his dynamic balance with gait based on the FGA score of 18/30 this date. However, his score on the FGA outcome measure reflects that he continues to be a fall risk and his safety with functional mobility has slightly improved.  Currently, Elijah Chase has met 2 goals and is making progress towards all remaining established PT POC goals at this time. Patient would benefit from continued skilled PT services to improve LE strength, coordination, transfers, balance, and gait in order to allow for safe and Independent functional mobility post stroke.  This therapist spoke to patient's daughter via phone after session. Daughter was informed regarding the insurance hard-cap of 30 combined PT/OT/ST visits per calender year. Discussed discharge planning with regard of 5 remaining visits and conserving visits. Daughter would like to continue with PT sessions until he reaches that 30 visit and then appeal to insurance for additional visits. Patient would benefit from 1 session every 3 weeks for a total of 5 visits to allow for more gains at home and in clinic in  terms of functional mobility. Patient and Daughter in agreement with plan.     Plan: Continue per plan of care, 1 session/week every 3 weeks   Focus on dual tasking and dynamic balance       Primary Dx: Abnormality of gait and mobility  Current Problem:  1. Abnormality of gait and mobility  Follow Up In Physical Therapy      2. Weakness due to acute stroke (Multi)  Follow Up In Physical Therapy      3. Impairment of balance  Follow Up In Physical Therapy          Subjective  Pt arrived 15 min late to session d/t transportation.  Patient states that he feels that his strength has improved by 50% since starting therapy.    Precautions   STEADI score of 6 (fall risk)    Pain   Denies pain.     Objective     Outcome Measures:  FGA - Functional Gait Assessment  Gait level surface: 2  Change in gait speed: 2  Gait with horizontal head turns: 2  Gait with vertical head turns: 2  Gait and pivot turn: 2  Step over obstacle: 1  Gait with narrow base of support: 2  Gait with eyes closed: 1  Ambulating backwards: 2  Steps: 2  FGA Total Score: 18    Other Measures  5x Sit to Stand: 12.5 sec  10M Walk Test: 9.64 sec without device (1.0 m/s)  Other Outcome Measures: TU.4 sec without devcie    Treatments:  Therapeutic Activity:  Assessment of pt's POC goals completed today- see objective, goals, and assessment section. Educated pt regarding results of examination findings and discussed next steps in POC progression. Educated pt regarding importance of continuing with good HEP compliance for optimal rehab results.    Gait Training:  Ambulated outdoors over uneven grass, slopes forward and backward  x 10 minutes total  Incoorporated manual and cognitive dual tasks while ambulating outdoors - pouring water from 1 cup to another, naming football teams  Lateral monster walks on uneven sloped grass 2 x 8'       EDUCATION:  Access Code: [email or web address]  URL: [email or web address]  Date: 2024  Prepared by: Katt  [employer]    Exercises  - Sit to Stand  - 1 x daily - 7 x weekly - 3 sets - 10 reps  - Standing Hip Abduction with Counter Support  - 1 x daily - 7 x weekly - 3 sets - 10 reps  - Standing Hip Extension with Counter Support  - 1 x daily - 7 x weekly - 3 sets - 10 reps  - Standing March with Counter Support  - 1 x daily - 7 x weekly - 3 sets - 10 reps  - Heel Toe Raises with Counter Support  - 1 x daily - 7 x weekly - 3 sets - 10 reps  - Lunge with Counter Support  - 1 x daily - 7 x weekly - 3 sets - 10 reps  - Side Stepping with Resistance at Feet  - 1 x daily - 7 x weekly - 3 sets - 10 reps  - Braided Sidestepping with Arms Out  - 1 x daily - 7 x weekly - 3 sets - 10 reps  - Forward and Backward Monster Walk with Resistance at Ankles and Counter Support  - 1 x daily - 7 x weekly - 3 sets - 10 reps  - Diagonal Step Up Coordination  - 1 x daily - 7 x weekly - 3 sets - 10 reps      Goals:  Active       PT Problem       PT Goal 1 (Met)       Start:  05/20/24    Expected End:  08/18/24    Resolved:  06/24/24    Elijah Chase will complete TUG within 12 seconds or less (indicative of higher fall risk) in order to increase balance and enhance safety during ADLs/ IADLs.          PT Goal 2 (Progressing)       Start:  05/20/24    Expected End:  08/18/24       Patient will perform 5xSTS test in </= 11 sec to demonstrate improved LE strength and decrease risk of falls         PT Goal 3 (Progressing)       Start:  05/20/24    Expected End:  08/18/24       Patient will score >/= 20/30 on FGA to meet MDC for stroke population and demonstrate improvement in balance and functional mobility         PT Goal 4 (Met)       Start:  05/20/24    Expected End:  08/18/24    Resolved:  06/24/24    Elijah Chase  will increase the 10MWT score by >/=0.16m/s (MCID) and > 0.8 m/s which is predictive of community ambulation post stroke.   Gait speed on the 10MWT in regards to ambulation classification: </= 0.4 m/s household ambulator; 0.4 m/s-0.8  m/s limited community ambulation; > 0.8 m/s community ambulator)          PT Goal 5 (Progressing)       Start:  05/20/24    Expected End:  08/18/24       Elijah Chase will demonstrate independence and report compliance with HEP to facilitate independent rehab program upon discharge.         Patient Stated Goal 1 (Progressing)       Start:  05/20/24    Expected End:  08/18/24       Get stronger

## 2024-06-24 NOTE — PROGRESS NOTES
"Speech-Language Pathology    SLP Adult Outpatient Speech-Language Pathology Treatment / Discharge Summary     Patient Name: Elijah Chase \"Gene\"  MRN: [id number]  Today's Date: 2024  Time Calculation  Start Time: 1015  Stop Time: 1100  Time Calculation (min): 45 min      Current Problem:   1. Dysarthria following cerebrovascular accident  Follow Up In Speech Therapy      2. Cognitive deficit status post cerebrovascular accident  Follow Up In Speech Therapy          SLP Assessment:  SLP TX Intervention Outcome: Making Progress Towards Goals  Treatment Provided: Yes, see Objective for details  Treatment Tolerance: Patient tolerated treatment well      Plan:  SLP TX Plan: Discharge ST; d/t patient's limited insurance coverage for rehab services (30 visits combined ST/PT/OT), patient/family have selected to utilize remaining 5 visits on PT services  Discussed POC: Patient  Discussed Risks/Benefits: Yes, Patient  Patient/Caregiver Agreeable: Yes      Subjective   Patient arrived to and attended treatment independently. Since last visit, patient was discharged from OT and is continuing with PT. Patient is approaching a hard cap for coverage of rehab services by his insurance ( today).      General Visit Information:  Number of Authorized Treatments : 30 visits combined ST/PT/OT ([facility])  Total Number of Visits : 5      Pain Assessment:  Pain Assessment: 0-10  Pain Score: 0 - No pain      Objective     GOALS: Start date: 2024; End/re-eval date: 2024  By discharge:  LT. Patient will independently and effectively communicate in valued conversations in the home and social settings using compensatory strategies as needed.  2. Patient will independently utilize compensatory strategies/tools for cognition in order to improve effectiveness of IADL management in the home, work, and community settings.     STGs:   1. Patient will utilize learned intelligibility enhancing strategies (e.g. reduced rate, " over-articulation, adequate volume) during reading activities with >= 95% accuracy with minimal verbal reminders.  > GOAL PROGRESSED/NOT MET: Intelligibility enhancing strategies practiced with patient during reading tasks. Patient required mild-mod cueing for consistent use.    2. Patient will utilize learned intelligibility enhancing strategies (e.g. reduced rate, over-articulation, adequate volume) during structured, spontaneous speech production activities with >= 90% accuracy with minimal verbal reminders.  > GOAL PROGRESSED/NOT MET: At the spontaneous discourse level, patient with mild-moderately reduced intelligibility, requiring SLP to request occasional repetition by patient for comprehension of message.     3. Patient will independently utilize compensatory strategies/tools for memory, attention, and executive functioning with 90% accuracy in order to improve effectiveness of IADL management in the home, work, and community settings.  > GOAL PROGRESSED/NOT MET: Patient cont'd to demonstrate difficulty with dual processing tasks both in ST and PT sessions. Patient with mild-moderate difficulty performing tasks involving selective and alternating/divided attention. This date, patient performed a sorting task during which therapist asked patient to name specified category members. Patient required frequent cueing to continue sorting while naming as patient had a tendency to stop while thinking. Patient's job requires a high degree of multitasking. He would also like to return to driving. A driving evaluation was recommended.      Outpatient Education:  Individual(s) Educated: Patient  Verbal Education : Summary of session performance  Risk and Benefits Discussed with Patient/Caregiver/Other: yes  Patient/Caregiver Demonstrated Understanding: yes  Plan of Care Discussed and Agreed Upon: yes  Patient Response to Education: Patient/Caregiver Verbalized Understanding of Information, Patient/Caregiver Asked  Appropriate Questions

## 2024-06-25 ENCOUNTER — HOSPITAL ENCOUNTER (OUTPATIENT)
Dept: RADIOLOGY | Facility: CLINIC | Age: 64
Discharge: HOME | End: 2024-06-25
Payer: COMMERCIAL

## 2024-06-25 DIAGNOSIS — I10 PRIMARY HYPERTENSION: ICD-10-CM

## 2024-06-25 DIAGNOSIS — N39.0 URINARY TRACT INFECTION WITHOUT HEMATURIA, SITE UNSPECIFIED: ICD-10-CM

## 2024-06-25 PROCEDURE — 76770 US EXAM ABDO BACK WALL COMP: CPT | Performed by: STUDENT IN AN ORGANIZED HEALTH CARE EDUCATION/TRAINING PROGRAM

## 2024-06-25 PROCEDURE — 76770 US EXAM ABDO BACK WALL COMP: CPT

## 2024-06-26 ENCOUNTER — APPOINTMENT (OUTPATIENT)
Dept: SPEECH THERAPY | Facility: CLINIC | Age: 64
End: 2024-06-26
Payer: COMMERCIAL

## 2024-06-26 ENCOUNTER — APPOINTMENT (OUTPATIENT)
Dept: PHYSICAL THERAPY | Facility: CLINIC | Age: 64
End: 2024-06-26
Payer: COMMERCIAL

## 2024-07-01 ENCOUNTER — PROCEDURE VISIT (OUTPATIENT)
Dept: UROLOGY | Facility: HOSPITAL | Age: 64
End: 2024-07-01
Payer: COMMERCIAL

## 2024-07-01 DIAGNOSIS — N39.0 URINARY TRACT INFECTION WITHOUT HEMATURIA, SITE UNSPECIFIED: ICD-10-CM

## 2024-07-01 DIAGNOSIS — Z79.2 PROPHYLACTIC ANTIBIOTIC: Primary | ICD-10-CM

## 2024-07-01 PROCEDURE — 99213 OFFICE O/P EST LOW 20 MIN: CPT | Performed by: STUDENT IN AN ORGANIZED HEALTH CARE EDUCATION/TRAINING PROGRAM

## 2024-07-01 PROCEDURE — 52000 CYSTOURETHROSCOPY: CPT | Performed by: STUDENT IN AN ORGANIZED HEALTH CARE EDUCATION/TRAINING PROGRAM

## 2024-07-01 RX ORDER — CIPROFLOXACIN 500 MG/1
500 TABLET ORAL ONCE
Status: SHIPPED | OUTPATIENT
Start: 2024-07-01

## 2024-07-01 ASSESSMENT — PAIN SCALES - GENERAL: PAINLEVEL: 0-NO PAIN

## 2024-07-01 NOTE — PROGRESS NOTES
"Subjective   Patient ID: Elijah Chase \"Gene\" is a 63 y.o. male    HPI  63 y.o. male who presented for cystoscopic evaluation for recurrent urinary tract infections (UTIs) and concerns about urinary retention. He has had three UTIs in the past month and a half, which started after a recent stroke. The patient was treated with Bactrim for a resistant UTI. He reports difficulty urinating and incomplete bladder emptying. The patient has not previously tried Flomax. He is advised to stay active, perform physical therapy, and avoid prolonged sitting or lying down. The patient is also advised to switch to decaf green tea and to empty his bladder frequently, even without the urge.      Review of Systems    All systems were reviewed. Anything negative was noted in the HPI.    Objective   Physical Exam    General: Well developed, well nourished, alert and cooperative, appears in no acute distress   Eyes: Non-injected conjunctiva, sclera clear, no proptosis   Cardiac: Extremities are warm and well perfused. No edema, cyanosis or pallor   Lungs: Breathing is easy, non-labored. Speaking in clear and complete sentences. Normal diaphragmatic movement   MSK: Ambulatory with steady gait, unassisted   Neuro: Alert and oriented to person, place, and time   Psych: Demonstrates good judgment and reason, without hallucinations, abnormal affect or abnormal behaviors   Skin: No obvious lesions, no rashes       No CVA tenderness bilaterally   No suprapubic pain or discomfort       No past medical history on file.      No past surgical history on file.    Procedure:  The patient was prepped using a Betadine solution. Lidocaine jelly was instilled into the urethra. The flexible cystoscope was sterilely inserted into the urethra and formal cystoscopy performed in a systematic fashion. For detailed findings of the procedure, please see Dr. Villareal’s remarks below  Scope A used, Cipro 500 mg p.o. given      Assessment/Plan   Cystoscopic " evaluation for Benign Prostatic Hyperplasia (BPH)    63 y.o. male who presents for the above condition, Today, we had a very long and extensive discussion with the patient regarding the pathophysiology, differential diagnosis, risk factor, associated condition, diagnostic work-up and management of BPH and lower urinary tract symptoms and acute urinary retention. I discussed with the patient the need to check his PSA to assess his prostate cancer risk. We discussed at length the mechanism of action, risk, benefit, adverse events and side effect of alpha-blocker in the form of tamsulosin 0.4 mg p.o nightly. We discussed in particular the risk of hypotension, lightheadedness, dizziness, and the risk of fall and bone fracture. Also discussed retrograde ejaculation of the side effects of the medication. We had another discussion with the patient regarding lifestyle modifications including low fluid intake after 5 PM, timed voiding every 2 hours, and decrease caffeine intake. I discussed with the patient that in case he had an elevated PSA, we will proceed do an MRI of the prostate.         Plan:  - Follow up in 6 months with PSA        7/1/2024    Tyrell Attestation  By signing my name below, IRamses Scribe   attest that this documentation has been prepared under the direction and in the presence of Dr. Jackson Villareal

## 2024-07-02 NOTE — PROGRESS NOTES
"Patient ID: Elijah Chase \"Gene\" is a 63 y.o. male.    Cystoscopy    Date/Time: 7/2/2024 12:07 PM    Performed by: Jackson Villareal MD MPH  Authorized by: Jackson Villareal MD MPH    Procedure - Bladder Cystoscopy:     Procedure details: cystoscopy    PROCEDURE NOTE:    PREOPERATIVE DIAGNOSIS:  BPH and LUTs    POSTOPERATIVE DIAGNOSIS:  Same    OPERATION:  Flexible Cystourethroscopy      SURGEON:  Jackson Villareal MD MPH    ANESTHESIA:  2%  lidocaine jelly    COMPLICATIONS:  None    EBL: Minimal      DISPOSITION:  The patient was discharged home after the procedure, per routine.    INDICATIONS: :  Mr. Chase is a 63 y.o. patient with a history of BPH and LUTs who presents today for Cystoscopy.     The indications, risks and benefits of this procedure were discussed with the patient, consent was obtained prior to the procedure, and to the best of my judgement the patient seemed to understand and agree to the procedure.    PROCEDURE:  The patient  was brought into the procedure suite and informed consent was reviewed and confirmed. Vital signs were obtained prior to the procedure: There were no vitals taken for this visit..  The patient was escorted onto the stretcher, placed supine, prepped with betadine and draped in the usual standard surgical fashion.  Intraurethral 2% viscous lidocaine jelly was used for local analgesia.  A 16 Indonesian flexible cystourethroscope was inserted into the urethra.   The penile urethra was normal.  The prostate urethra was enlarged.  Upon entering the bladder the entire bladder was surveyed in a 360 degree fashion.  The left and right ureteral orifices were in normal orthotopic position effluxing clear yellow urine, bilaterally.   There was no evidence of any bladder lesions, foreign objects, stones or evidence of any mucosal changes. The cystoscope was then retroflexed.  The bladder neck was then further examined without any evidence of lesions. The scope was then removed and in an " antegrade fashion, the urethra and bladder were again resurveyed with no evidence of additional lesions.  The cystoscope was then fully removed.   The patient tolerated the procedure well.  Vitals were stable after the procedure.  The patient was able to void and was discharged home.  Verbal and written Post procedure instructions were reviewed with the patient.    IMPRESSION:  BPH    PLAN:  Urolift Vs Flomax discussed

## 2024-07-08 NOTE — ASSESSMENT & PLAN NOTE
Rechecking Hba1c - will call with results and adjust medications accordingly.  May hold Lantus pending review of above with report of low glucose readings intermittently.  Follow up 3 months.

## 2024-07-08 NOTE — ASSESSMENT & PLAN NOTE
Stable - follow up with neurology as scheduled.  No changes in medications at this time.  Follow up at least annually with neurology.

## 2024-07-08 NOTE — ASSESSMENT & PLAN NOTE
BP controlled today.  Continue medications per cardiology - follow up appointments reviewed.  Follow up 6 months.

## 2024-07-09 ENCOUNTER — APPOINTMENT (OUTPATIENT)
Dept: ENDOCRINOLOGY | Facility: CLINIC | Age: 64
End: 2024-07-09
Payer: COMMERCIAL

## 2024-07-12 ENCOUNTER — APPOINTMENT (OUTPATIENT)
Dept: PHYSICAL MEDICINE AND REHAB | Facility: CLINIC | Age: 64
End: 2024-07-12
Payer: COMMERCIAL

## 2024-07-16 ENCOUNTER — PREP FOR PROCEDURE (OUTPATIENT)
Dept: UROLOGY | Facility: HOSPITAL | Age: 64
End: 2024-07-16

## 2024-07-16 ENCOUNTER — HOSPITAL ENCOUNTER (OUTPATIENT)
Facility: HOSPITAL | Age: 64
Setting detail: OUTPATIENT SURGERY
End: 2024-07-16
Attending: STUDENT IN AN ORGANIZED HEALTH CARE EDUCATION/TRAINING PROGRAM | Admitting: STUDENT IN AN ORGANIZED HEALTH CARE EDUCATION/TRAINING PROGRAM
Payer: COMMERCIAL

## 2024-07-16 DIAGNOSIS — N40.1 BPH WITH OBSTRUCTION/LOWER URINARY TRACT SYMPTOMS: Primary | ICD-10-CM

## 2024-07-16 DIAGNOSIS — N13.8 BPH WITH OBSTRUCTION/LOWER URINARY TRACT SYMPTOMS: Primary | ICD-10-CM

## 2024-07-16 RX ORDER — ASPIRIN 81 MG/1
81 TABLET ORAL DAILY
Qty: 90 TABLET | Refills: 1 | Status: SHIPPED | OUTPATIENT
Start: 2024-07-16 | End: 2025-01-12

## 2024-07-16 RX ORDER — SODIUM CHLORIDE 9 MG/ML
100 INJECTION, SOLUTION INTRAVENOUS CONTINUOUS
OUTPATIENT
Start: 2024-07-16

## 2024-07-16 RX ORDER — CIPROFLOXACIN 2 MG/ML
400 INJECTION, SOLUTION INTRAVENOUS ONCE
OUTPATIENT
Start: 2024-07-16 | End: 2024-07-16

## 2024-07-17 ENCOUNTER — APPOINTMENT (OUTPATIENT)
Dept: CARDIOLOGY | Facility: CLINIC | Age: 64
End: 2024-07-17
Payer: COMMERCIAL

## 2024-07-28 DIAGNOSIS — E11.9 TYPE 2 DIABETES MELLITUS WITHOUT COMPLICATION, WITHOUT LONG-TERM CURRENT USE OF INSULIN (MULTI): ICD-10-CM

## 2024-07-28 DIAGNOSIS — I10 PRIMARY HYPERTENSION: ICD-10-CM

## 2024-07-28 DIAGNOSIS — E78.49 OTHER HYPERLIPIDEMIA: ICD-10-CM

## 2024-07-29 ENCOUNTER — APPOINTMENT (OUTPATIENT)
Dept: PRIMARY CARE | Facility: CLINIC | Age: 64
End: 2024-07-29
Payer: COMMERCIAL

## 2024-07-30 RX ORDER — METFORMIN HYDROCHLORIDE 500 MG/1
TABLET, EXTENDED RELEASE ORAL
Qty: 90 TABLET | Refills: 1 | Status: SHIPPED | OUTPATIENT
Start: 2024-07-30

## 2024-07-30 RX ORDER — LISINOPRIL 20 MG/1
20 TABLET ORAL DAILY
Qty: 90 TABLET | Refills: 1 | Status: SHIPPED | OUTPATIENT
Start: 2024-07-30

## 2024-07-30 RX ORDER — ASPIRIN 81 MG/1
81 TABLET ORAL DAILY
Qty: 90 TABLET | Refills: 1 | Status: SHIPPED | OUTPATIENT
Start: 2024-07-30

## 2024-07-30 RX ORDER — ATORVASTATIN CALCIUM 40 MG/1
80 TABLET, FILM COATED ORAL DAILY
Qty: 180 TABLET | Refills: 1 | Status: SHIPPED | OUTPATIENT
Start: 2024-07-30

## 2024-08-23 ENCOUNTER — APPOINTMENT (OUTPATIENT)
Dept: PHYSICAL MEDICINE AND REHAB | Facility: CLINIC | Age: 64
End: 2024-08-23
Payer: COMMERCIAL

## 2024-08-29 ENCOUNTER — TELEPHONE (OUTPATIENT)
Dept: UROLOGY | Facility: HOSPITAL | Age: 64
End: 2024-08-29
Payer: COMMERCIAL

## 2024-08-29 NOTE — TELEPHONE ENCOUNTER
MK on his cell phone with cancellation information; patient needs neurosurgery procedure before Urolift per Dr. Cruz.    Nithya Castro LPN

## 2024-09-03 ENCOUNTER — APPOINTMENT (OUTPATIENT)
Dept: NEUROLOGY | Facility: HOSPITAL | Age: 64
End: 2024-09-03
Payer: COMMERCIAL

## 2024-09-05 DIAGNOSIS — I63.511 CEREBROVASCULAR ACCIDENT (CVA) DUE TO STENOSIS OF RIGHT MIDDLE CEREBRAL ARTERY (MULTI): Primary | ICD-10-CM

## 2024-09-09 ENCOUNTER — TREATMENT (OUTPATIENT)
Dept: PHYSICAL THERAPY | Facility: CLINIC | Age: 64
End: 2024-09-09
Payer: COMMERCIAL

## 2024-09-09 DIAGNOSIS — R26.89 IMPAIRMENT OF BALANCE: ICD-10-CM

## 2024-09-09 DIAGNOSIS — R26.9 ABNORMALITY OF GAIT AND MOBILITY: ICD-10-CM

## 2024-09-09 DIAGNOSIS — R53.1 WEAKNESS DUE TO ACUTE STROKE (MULTI): ICD-10-CM

## 2024-09-09 DIAGNOSIS — I63.9 WEAKNESS DUE TO ACUTE STROKE (MULTI): ICD-10-CM

## 2024-09-09 PROCEDURE — 97530 THERAPEUTIC ACTIVITIES: CPT | Mod: GP

## 2024-09-09 PROCEDURE — 97112 NEUROMUSCULAR REEDUCATION: CPT | Mod: GP

## 2024-09-09 NOTE — PROGRESS NOTES
Physical Therapy    Physical Therapy Treatment    Patient Name: Elijah Chase  MRN: [id number]  Today's Date: 9/9/2024  Time Calculation  Start Time: 0926  Stop Time: 1011  Time Calculation (min): 45 min     PT Therapeutic Procedures Time Entry  Neuromuscular Re-Education Time Entry: 30  Therapeutic Activity Time Entry: 15    Insurance: [facility] (30 visits PT/OT/ST)    Plan of Care: 5/20/24 - 11/29/24  Primary Diagnosis: Abnormal Gait and Mobility  Visit Number: 11    Assessment:  Elijah Chase has completed 11 sessions of physical therapy treatment with emphasis upon gait, strength, and balance training. He demonstrates improved B LE strength as demonstrated by 5xSTS test time at 12.0 sec which is near his age range norm. He has improved his TUG time to 8.5 sec without device and 10MWT to 8.8 sec demonstrating a 1.13 m/s gait speed without device. He is demonstrating improvement in his dynamic balance with gait based on the FGA score of 19/30 this date. However, his score on the FGA outcome measure reflects that he continues to be a fall risk and his safety with functional mobility has slightly improved. Patient continues to demonstrate difficulty with cognitive dual tasking, dynamic balance ethan head turns, tandem, and backward walking. He continues to demonstrate decreased motor planning and proprioception of L UE and LE as he occasionally scuffs L toes in swing phase and has slight L hip circumduction. Discussed with patient the importance of improving overall functional mobility and safety with ambulation and daily tasks with performance of home program and gym at recreation center.    Plan: Continue per plan of care -- 1 session/week every 3 weeks for 4 total sessions  Focus on dual tasking and dynamic balance       Primary Dx: Abnormality of gait and mobility  Problem List Items Addressed This Visit             ICD-10-CM    Weakness due to acute stroke (Multi) I63.9, R53.1     Other Visit Diagnoses         Codes     Abnormality of gait and mobility     R26.9    Impairment of balance     R26.89            Subjective    Patient states that he has been going to the REC in which he has been lifting weights and leg press. He continues to feel that his balance is off. He states that he is doing home program daily. He states since the 2 month break, he feels his walking and strength has gotten better.    Precautions   STEADI score of 6 (fall risk)    Pain   Denies pain.     Objective     Outcome Measures:  FGA - Functional Gait Assessment  Gait level surface: 2  Change in gait speed: 2  Gait with horizontal head turns: 2  Gait with vertical head turns: 2  Gait and pivot turn: 2  Step over obstacle: 2  Gait with narrow base of support: 1  Gait with eyes closed: 1  Ambulating backwards: 2  Steps: 3  FGA Total Score: 19    Other Measures  5x Sit to Stand: 12.0 sec  10M Walk Test: 8.8 sec without device (1.13 m/s)  Other Outcome Measures: TU.5 sec without device      Treatments:  Therapeutic Activity:  Assessment of pt's POC goals completed today- see objective, goals, and assessment section. Educated pt regarding results of examination findings and discussed next steps in POC progression. Educated pt regarding importance of continuing with good HEP compliance for optimal rehab results.    Neuromuscular Re-education:  - Sit to stand with med ball x 30 sec (10)  - Tandem walking with HT 3 x 12 feet d/b at CGA  - Obstacle course (3 hurdles, gym mat, 4 river stones, airex) with cognitive dual task x 6 laps   - Obstacle course fast (3 hurdles, gym mat, 4 river stones, airex) x 2 laps   - Resistive forward walking with Blue dynamic TB x 150 feet + random cues to stop  - Assistive walking with Blue dynamic TB 2 x 150 feet in order for patient to decelerate PT speed + random cues to stop   - staggered stance reactive balance with ZOOM Ball x 1-2 min      EDUCATION:  Access Code: [email or web address]  URL: [email or web address]  Date:  06/19/2024  Prepared by: Katt Banks    Exercises  - Sit to Stand  - 1 x daily - 7 x weekly - 3 sets - 10 reps  - Standing Hip Abduction with Counter Support  - 1 x daily - 7 x weekly - 3 sets - 10 reps  - Standing Hip Extension with Counter Support  - 1 x daily - 7 x weekly - 3 sets - 10 reps  - Standing March with Counter Support  - 1 x daily - 7 x weekly - 3 sets - 10 reps  - Heel Toe Raises with Counter Support  - 1 x daily - 7 x weekly - 3 sets - 10 reps  - Lunge with Counter Support  - 1 x daily - 7 x weekly - 3 sets - 10 reps  - Side Stepping with Resistance at Feet  - 1 x daily - 7 x weekly - 3 sets - 10 reps  - Braided Sidestepping with Arms Out  - 1 x daily - 7 x weekly - 3 sets - 10 reps  - Forward and Backward Monster Walk with Resistance at Ankles and Counter Support  - 1 x daily - 7 x weekly - 3 sets - 10 reps  - Diagonal Step Up Coordination  - 1 x daily - 7 x weekly - 3 sets - 10 reps      Goals:  Active       PT Problem       PT Goal 1 (Met)       Start:  05/20/24    Expected End:  08/18/24    Resolved:  06/24/24    Elijah Chase will complete TUG within 12 seconds or less (indicative of higher fall risk) in order to increase balance and enhance safety during ADLs/ IADLs.          PT Goal 2 (Progressing)       Start:  05/20/24    Expected End:  11/29/24       Patient will perform 5xSTS test in </= 11 sec to demonstrate improved LE strength and decrease risk of falls         PT Goal 3 (Progressing)       Start:  05/20/24    Expected End:  11/29/24       Patient will score >/= 20/30 on FGA to meet MDC for stroke population and demonstrate improvement in balance and functional mobility         PT Goal 4 (Met)       Start:  05/20/24    Expected End:  08/18/24    Resolved:  06/24/24    Elijah Chase  will increase the 10MWT score by >/=0.16m/s (MCID) and > 0.8 m/s which is predictive of community ambulation post stroke.   Gait speed on the 10MWT in regards to ambulation classification: </= 0.4 m/s  household ambulator; 0.4 m/s-0.8 m/s limited community ambulation; > 0.8 m/s community ambulator)          PT Goal 5 (Progressing)       Start:  05/20/24    Expected End:  11/29/24       Elijah Chase will demonstrate independence and report compliance with HEP to facilitate independent rehab program upon discharge.         Patient Stated Goal 1 (Progressing)       Start:  05/20/24    Expected End:  11/29/24       Get stronger

## 2024-09-27 ENCOUNTER — APPOINTMENT (OUTPATIENT)
Dept: PHYSICAL MEDICINE AND REHAB | Facility: CLINIC | Age: 64
End: 2024-09-27
Payer: COMMERCIAL

## 2024-10-03 ENCOUNTER — TREATMENT (OUTPATIENT)
Dept: PHYSICAL THERAPY | Facility: CLINIC | Age: 64
End: 2024-10-03
Payer: COMMERCIAL

## 2024-10-03 DIAGNOSIS — R26.9 ABNORMALITY OF GAIT AND MOBILITY: ICD-10-CM

## 2024-10-03 DIAGNOSIS — R53.1 WEAKNESS DUE TO ACUTE STROKE (MULTI): ICD-10-CM

## 2024-10-03 DIAGNOSIS — I63.9 WEAKNESS DUE TO ACUTE STROKE (MULTI): ICD-10-CM

## 2024-10-03 DIAGNOSIS — R26.89 IMPAIRMENT OF BALANCE: ICD-10-CM

## 2024-10-03 PROCEDURE — 97110 THERAPEUTIC EXERCISES: CPT | Mod: GP

## 2024-10-03 PROCEDURE — 97112 NEUROMUSCULAR REEDUCATION: CPT | Mod: GP

## 2024-10-03 NOTE — PROGRESS NOTES
Physical Therapy    Physical Therapy Treatment    Patient Name: Elijah Chase  MRN: [id number]  Today's Date: 10/3/2024  Time Calculation  Start Time: 1102  Stop Time: 1155  Time Calculation (min): 53 min     PT Therapeutic Procedures Time Entry  Neuromuscular Re-Education Time Entry: 45  Therapeutic Exercise Time Entry: 8    Insurance: [facility] (30 visits PT/OT/ST)    Plan of Care: 5/20/24 - 11/29/24  Primary Diagnosis: Abnormal Gait and Mobility  Visit Number: 12    Assessment:  Patient continues to demonstrate improvement in functional LE strength and power as demonstrated by 5xSTS test. He demonstrated increased difficulty with river stones and compliant surfaces. He continues to demonstrate L LE toe drag occasionally during ambulation ethan with fatigue or dual task; continue to educate patient that he needs to contently cue himself to  L LE during ambulation. He continues to demonstrate L UE flexor synergy pattern with ambulation and therapeutic tasks. Added heel walk and toe walk to focus on endurance of DF and PF muscles respectively to allow for decreased L LE toe drag.    Plan: Continue per plan of care -- 1 session/week every 3 weeks for 4 total sessions  Focus on dual tasking and dynamic balance       Primary Dx: Abnormality of gait and mobility  Problem List Items Addressed This Visit             ICD-10-CM    Weakness due to acute stroke (Multi) I63.9, R53.1     Other Visit Diagnoses         Codes    Abnormality of gait and mobility     R26.9    Impairment of balance     R26.89            Subjective    Patient denies any new events or changes in status since last PT session. He states he is walking 2 miles every other day and performing HEP daily.    Precautions   STEADI score of 6 (fall risk)    Pain   Denies pain.     Objective     Outcome Measures:  Other Measures  5x Sit to Stand: 11.55 sec      Treatments:  Therapeutic Exercise:  Seated stepper lvl 4 at 80+ steps per min x 8 min for moderate  "intensity aerobic steps  Educated patient on graded aerobic exercise (walking, seated bike/peddler): begin at 10-15 min at a moderate intensity (6-7/10 on RPE scale, should be able to hold short conversation) and then increase by 10% each week until 30 minutes is achieved for 4-5 days per week. Educated on HIIT on seated bike of 1 min \"fast\" and 1 min \"normal pace\"    Neuromuscular Re-education:  - Sit to stand 2 x 30 sec (14)  - Standing alt marches with green TB  - [place] Obstacle Course (airex + pod, 6 river stones on ground + pod, and gym mat + 3 hurdles + pod) 2 x 2:30 min (16 hits)  - Heel walking 3 x 12' d/b  - Toe walking  3 x 12' d/b  - NBOS on airex vertical HT 2 x 10  - Semi tandem on airex vertical and horizontal HT 2 x 10      EDUCATION:  Access Code: [email or web address]  URL: [email or web address]  Date: 06/19/2024  Prepared by: Katt Banks    Exercises  - Sit to Stand  - 1 x daily - 7 x weekly - 3 sets - 10 reps  - Standing Hip Abduction with Counter Support  - 1 x daily - 7 x weekly - 3 sets - 10 reps  - Standing Hip Extension with Counter Support  - 1 x daily - 7 x weekly - 3 sets - 10 reps  - Standing March with Counter Support  - 1 x daily - 7 x weekly - 3 sets - 10 reps  - Heel Toe Raises with Counter Support  - 1 x daily - 7 x weekly - 3 sets - 10 reps  - Lunge with Counter Support  - 1 x daily - 7 x weekly - 3 sets - 10 reps  - Side Stepping with Resistance at Feet  - 1 x daily - 7 x weekly - 3 sets - 10 reps  - Braided Sidestepping with Arms Out  - 1 x daily - 7 x weekly - 3 sets - 10 reps  - Forward and Backward Monster Walk with Resistance at Ankles and Counter Support  - 1 x daily - 7 x weekly - 3 sets - 10 reps  - Diagonal Step Up Coordination  - 1 x daily - 7 x weekly - 3 sets - 10 reps      Goals:  Active       PT Problem       PT Goal 1 (Met)       Start:  05/20/24    Expected End:  08/18/24    Resolved:  06/24/24    Elijah Chase will complete TUG within 12 seconds or less (indicative " of higher fall risk) in order to increase balance and enhance safety during ADLs/ IADLs.          PT Goal 2 (Progressing)       Start:  05/20/24    Expected End:  11/29/24       Patient will perform 5xSTS test in </= 11 sec to demonstrate improved LE strength and decrease risk of falls         PT Goal 3 (Progressing)       Start:  05/20/24    Expected End:  11/29/24       Patient will score >/= 20/30 on FGA to meet MDC for stroke population and demonstrate improvement in balance and functional mobility         PT Goal 4 (Met)       Start:  05/20/24    Expected End:  08/18/24    Resolved:  06/24/24    Elijah Chase  will increase the 10MWT score by >/=0.16m/s (MCID) and > 0.8 m/s which is predictive of community ambulation post stroke.   Gait speed on the 10MWT in regards to ambulation classification: </= 0.4 m/s household ambulator; 0.4 m/s-0.8 m/s limited community ambulation; > 0.8 m/s community ambulator)          PT Goal 5 (Progressing)       Start:  05/20/24    Expected End:  11/29/24       Elijah Chase will demonstrate independence and report compliance with HEP to facilitate independent rehab program upon discharge.         Patient Stated Goal 1 (Progressing)       Start:  05/20/24    Expected End:  11/29/24       Get stronger

## 2024-10-07 ENCOUNTER — LAB (OUTPATIENT)
Dept: LAB | Facility: HOSPITAL | Age: 64
End: 2024-10-07
Payer: COMMERCIAL

## 2024-10-07 DIAGNOSIS — I63.511 CEREBROVASCULAR ACCIDENT (CVA) DUE TO STENOSIS OF RIGHT MIDDLE CEREBRAL ARTERY (MULTI): ICD-10-CM

## 2024-10-07 LAB
ANION GAP SERPL CALC-SCNC: 14 MMOL/L (ref 10–20)
BUN SERPL-MCNC: 17 MG/DL (ref 6–23)
CALCIUM SERPL-MCNC: 9.8 MG/DL (ref 8.6–10.6)
CHLORIDE SERPL-SCNC: 102 MMOL/L (ref 98–107)
CO2 SERPL-SCNC: 27 MMOL/L (ref 21–32)
CREAT SERPL-MCNC: 0.86 MG/DL (ref 0.5–1.3)
EGFRCR SERPLBLD CKD-EPI 2021: >90 ML/MIN/1.73M*2
ERYTHROCYTE [DISTWIDTH] IN BLOOD BY AUTOMATED COUNT: 15.1 % (ref 11.5–14.5)
GLUCOSE SERPL-MCNC: 97 MG/DL (ref 74–99)
HCT VFR BLD AUTO: 40.3 % (ref 41–52)
HGB BLD-MCNC: 13.3 G/DL (ref 13.5–17.5)
INR PPP: 1.1 (ref 0.9–1.1)
MCH RBC QN AUTO: 27.4 PG (ref 26–34)
MCHC RBC AUTO-ENTMCNC: 33 G/DL (ref 32–36)
MCV RBC AUTO: 83 FL (ref 80–100)
NRBC BLD-RTO: 0 /100 WBCS (ref 0–0)
PLATELET # BLD AUTO: 341 X10*3/UL (ref 150–450)
POTASSIUM SERPL-SCNC: 4.1 MMOL/L (ref 3.5–5.3)
PROTHROMBIN TIME: 12.1 SECONDS (ref 9.8–12.8)
RBC # BLD AUTO: 4.85 X10*6/UL (ref 4.5–5.9)
SODIUM SERPL-SCNC: 139 MMOL/L (ref 136–145)
WBC # BLD AUTO: 4.8 X10*3/UL (ref 4.4–11.3)

## 2024-10-07 PROCEDURE — 85610 PROTHROMBIN TIME: CPT

## 2024-10-07 PROCEDURE — 36415 COLL VENOUS BLD VENIPUNCTURE: CPT

## 2024-10-07 PROCEDURE — 85027 COMPLETE CBC AUTOMATED: CPT

## 2024-10-07 PROCEDURE — 80048 BASIC METABOLIC PNL TOTAL CA: CPT

## 2024-10-08 ENCOUNTER — APPOINTMENT (OUTPATIENT)
Dept: NEPHROLOGY | Facility: CLINIC | Age: 64
End: 2024-10-08
Payer: COMMERCIAL

## 2024-10-08 ENCOUNTER — HOSPITAL ENCOUNTER (OUTPATIENT)
Dept: RADIOLOGY | Facility: HOSPITAL | Age: 64
Discharge: HOME | End: 2024-10-08
Payer: COMMERCIAL

## 2024-10-08 VITALS
RESPIRATION RATE: 17 BRPM | HEART RATE: 51 BPM | DIASTOLIC BLOOD PRESSURE: 62 MMHG | TEMPERATURE: 97.9 F | OXYGEN SATURATION: 100 % | SYSTOLIC BLOOD PRESSURE: 124 MMHG

## 2024-10-08 DIAGNOSIS — I63.541: ICD-10-CM

## 2024-10-08 PROCEDURE — 36222 PLACE CATH CAROTID/INOM ART: CPT | Mod: RT | Performed by: NEUROLOGICAL SURGERY

## 2024-10-08 PROCEDURE — C1760 CLOSURE DEV, VASC: HCPCS | Performed by: NEUROLOGICAL SURGERY

## 2024-10-08 PROCEDURE — 36224 PLACE CATH CAROTD ART: CPT | Performed by: NEUROLOGICAL SURGERY

## 2024-10-08 PROCEDURE — C1769 GUIDE WIRE: HCPCS | Performed by: NEUROLOGICAL SURGERY

## 2024-10-08 PROCEDURE — 99152 MOD SED SAME PHYS/QHP 5/>YRS: CPT | Performed by: NEUROLOGICAL SURGERY

## 2024-10-08 PROCEDURE — 99153 MOD SED SAME PHYS/QHP EA: CPT | Performed by: NEUROLOGICAL SURGERY

## 2024-10-08 PROCEDURE — 2550000001 HC RX 255 CONTRASTS: Performed by: NEUROLOGICAL SURGERY

## 2024-10-08 PROCEDURE — 7100000009 HC PHASE TWO TIME - INITIAL BASE CHARGE: Performed by: NEUROLOGICAL SURGERY

## 2024-10-08 PROCEDURE — 2500000004 HC RX 250 GENERAL PHARMACY W/ HCPCS (ALT 636 FOR OP/ED): Performed by: NEUROLOGICAL SURGERY

## 2024-10-08 PROCEDURE — 75710 ARTERY X-RAYS ARM/LEG: CPT | Performed by: NEUROLOGICAL SURGERY

## 2024-10-08 PROCEDURE — 96373 THER/PROPH/DIAG INJ IA: CPT | Mod: 59 | Performed by: NEUROLOGICAL SURGERY

## 2024-10-08 PROCEDURE — 7100000010 HC PHASE TWO TIME - EACH INCREMENTAL 1 MINUTE: Performed by: NEUROLOGICAL SURGERY

## 2024-10-08 PROCEDURE — 2780000003 HC OR 278 NO HCPCS: Performed by: NEUROLOGICAL SURGERY

## 2024-10-08 PROCEDURE — 2720000007 HC OR 272 NO HCPCS: Performed by: NEUROLOGICAL SURGERY

## 2024-10-08 PROCEDURE — 75710 ARTERY X-RAYS ARM/LEG: CPT | Mod: RT | Performed by: NEUROLOGICAL SURGERY

## 2024-10-08 RX ORDER — MIDAZOLAM HYDROCHLORIDE 1 MG/ML
INJECTION INTRAMUSCULAR; INTRAVENOUS
Status: COMPLETED | OUTPATIENT
Start: 2024-10-08 | End: 2024-10-08

## 2024-10-08 RX ORDER — FENTANYL CITRATE 50 UG/ML
INJECTION, SOLUTION INTRAMUSCULAR; INTRAVENOUS
Status: COMPLETED | OUTPATIENT
Start: 2024-10-08 | End: 2024-10-08

## 2024-10-08 ASSESSMENT — PAIN SCALES - GENERAL

## 2024-10-08 ASSESSMENT — PAIN - FUNCTIONAL ASSESSMENT
PAIN_FUNCTIONAL_ASSESSMENT: 0-10

## 2024-10-08 NOTE — SIGNIFICANT EVENT
Patient s/p diagnostic angiogram on 10/8/2024. Diagnostic angiogram with patent right M1 and right cavernous ICA s/p intracranial stents. Due to sub-maximal wall apposition, recommend patient maintain on ASA/Brillinta for 1 year post intracranial stent placement and then ASA monotherapy after.

## 2024-10-08 NOTE — DISCHARGE INSTRUCTIONS
You received moderate sedation:  - Do not drive a car, or operate any machinery or power tools of any kind.  - Do not drink any alcoholic drinks.  - Do not take any over the counter medications that may cause drowsiness.  - Do not make any important decisions or sign any legal documents.  - You need to have a responsible adult accompany you home.  - You may resume your normal diet.  - We strongly suggest that a responsible adult be with you for the rest of the day and also during the night. This is for your protection and safety.     For questions related to your procedure:  Please call [phone] between the hours of 7:00am-5:00pm Monday through Friday.  Please call [phone] after 5:00pm and on weekends and holidays.     In the event of an emergency call 911 or go to your nearest emergency room.

## 2024-10-08 NOTE — PROCEDURES
Pre-Procedure H&P     Provider Assessment:  Diagnosis/Reason for Procedure: diagnostic cerebral angiogram  Procedure: Diagnostic Cerebral Angiogram  Medications Reviewed:   yes   Prophylatic Antibiotics Needed:   no    Neuro status: A&Ox3, moving all extremities full strength   Mouth Opening OK: yes   Neck Flexibility OK: yes   Sedation Plan: moderate sedation   COVID-19 Risk Consent:  Surgeon has reviewed key risks related to the risk of rcoío COVID-19 and if they contract COVID-19 what the risks are.

## 2024-10-08 NOTE — PROCEDURES
Pre-Procedure Verification and Time Out:  Procedure Location: procedure area  HUDDLE - Pre-procedure Verification:  completed  TIME OUT - Final Verification:  completed immediately prior to procedure start  DEBRIEF: completed    Complications:  None; patient tolerated the procedure well.     Disposition: rPCU  Condition: stable  Specimens Collected: No specimens collected    General Information:   Anesthesia/ sedation: Non-Anesthesia  Indication(s)/Pre - Procedure Diagnoses: Intracranial Stenosis   Post-Procedure Diagnosis: Intracranial Stenosis   Procedure Name: Diagnostic Cerebral Angiogram  Procedure performed by: Sharmila Cruz   Assistant(s): Bobby Evans   Estimated Blood Loss (mL): 10  Specimen: no  Informed Consent: consent obtained and in chart    Access: 5 Fr Sheath in R CFA  Closure: Mynx  Vessels Injected: R ICA, R CFA   Moderate Sedation Time: 45 min  Findings: Right M1 and R cavernous intracranial stent with patient vessels, Full report to follow in PACS dictation

## 2024-10-11 ENCOUNTER — PATIENT MESSAGE (OUTPATIENT)
Dept: PRIMARY CARE | Facility: CLINIC | Age: 64
End: 2024-10-11
Payer: COMMERCIAL

## 2024-10-16 ENCOUNTER — APPOINTMENT (OUTPATIENT)
Dept: OPHTHALMOLOGY | Facility: CLINIC | Age: 64
End: 2024-10-16
Payer: COMMERCIAL

## 2024-10-16 DIAGNOSIS — E10.9 TYPE 1 DIABETES MELLITUS WITHOUT COMPLICATION: ICD-10-CM

## 2024-10-16 DIAGNOSIS — H53.40 VISUAL FIELD DEFECT: ICD-10-CM

## 2024-10-16 DIAGNOSIS — H25.13 AGE-RELATED NUCLEAR CATARACT OF BOTH EYES: ICD-10-CM

## 2024-10-16 DIAGNOSIS — Z86.73 HISTORY OF STROKE: Primary | ICD-10-CM

## 2024-10-16 PROCEDURE — 92083 EXTENDED VISUAL FIELD XM: CPT | Performed by: OPHTHALMOLOGY

## 2024-10-16 PROCEDURE — 99203 OFFICE O/P NEW LOW 30 MIN: CPT | Performed by: OPHTHALMOLOGY

## 2024-10-16 ASSESSMENT — REFRACTION_MANIFEST
METHOD_AUTOREFRACTION: 1
OS_SPHERE: +0.50
OD_AXIS: 071
OD_SPHERE: +0.25
OS_CYLINDER: -0.75
OD_CYLINDER: -0.50
OS_AXIS: 079

## 2024-10-16 ASSESSMENT — EXTERNAL EXAM - RIGHT EYE: OD_EXAM: NORMAL

## 2024-10-16 ASSESSMENT — TONOMETRY
OD_IOP_MMHG: 13
IOP_METHOD: GOLDMANN APPLANATION
OS_IOP_MMHG: 13

## 2024-10-16 ASSESSMENT — CUP TO DISC RATIO
OD_RATIO: 0.3
OS_RATIO: 0.3

## 2024-10-16 ASSESSMENT — VISUAL ACUITY
OS_SC: 20/20-1
METHOD: SNELLEN - LINEAR
OD_SC: 20/20-1

## 2024-10-16 ASSESSMENT — SLIT LAMP EXAM - LIDS
COMMENTS: NORMAL
COMMENTS: NORMAL

## 2024-10-16 ASSESSMENT — EXTERNAL EXAM - LEFT EYE: OS_EXAM: NORMAL

## 2024-10-16 NOTE — PROGRESS NOTES
Assessment/Plan   Diagnoses and all orders for this visit:  History of stroke  Mild nasal constriction in visual field (VF) right eye    Age-related nuclear cataract of both eyes  Not visually significant at the present time  continue to monitor    Type 1 diabetes mellitus without complication  continue to monitor    Visual field defect right eye-mild  -will monitor    Return for a dilated exam in   6  months or sooner if having any problems  Repeat visual field (VF) at next visit

## 2024-10-23 ENCOUNTER — TREATMENT (OUTPATIENT)
Dept: PHYSICAL THERAPY | Facility: CLINIC | Age: 64
End: 2024-10-23
Payer: COMMERCIAL

## 2024-10-23 DIAGNOSIS — R26.9 ABNORMALITY OF GAIT AND MOBILITY: ICD-10-CM

## 2024-10-23 DIAGNOSIS — R53.1 WEAKNESS DUE TO ACUTE STROKE (MULTI): ICD-10-CM

## 2024-10-23 DIAGNOSIS — R26.89 IMPAIRMENT OF BALANCE: ICD-10-CM

## 2024-10-23 DIAGNOSIS — I63.9 WEAKNESS DUE TO ACUTE STROKE (MULTI): ICD-10-CM

## 2024-10-23 PROCEDURE — 97530 THERAPEUTIC ACTIVITIES: CPT | Mod: GP

## 2024-10-23 PROCEDURE — 97110 THERAPEUTIC EXERCISES: CPT | Mod: GP

## 2024-10-23 NOTE — PROGRESS NOTES
Physical Therapy    Physical Therapy Treatment and Discharge    Patient Name: Elijah Chase  MRN: [id number]  Today's Date: 10/23/2024  Time Calculation  Start Time: 1105  Stop Time: 1155  Time Calculation (min): 50 min     PT Therapeutic Procedures Time Entry  Therapeutic Exercise Time Entry: 20  Therapeutic Activity Time Entry: 30    Insurance: [facility] (30 visits PT/OT/ST)    Plan of Care: 5/20/24 - 11/29/24  Primary Diagnosis: Abnormal Gait and Mobility  Visit Number: 13    Assessment:  Elijah Chase has completed 13 sessions of physical therapy treatment with emphasis upon gait, strength, and balance training. Patient demonstrates improved functional LE strength based on 5xSTS as he completed 5 stands in 8.52 sec compared to 18.25 sec at initial evaluation (10 sec change). Patient has improved FGA score from 15/30 on initial evaluation to 26/30 this date demonstrating improved dynamic balance and gait. He improved his TUG time from 15 sec with SPC at initial evaluation to 8.3 sec without device this date demonstrating that he is not a fall risk based on TUG. Patient has significantly improved his walking speed from 0.7 m/s to 1.2 m/s since start of plan of care. His significant improvement on the above outcome measures, reflect that he is no longer a fall risk and his safety with functional mobility has improved.  Currently, Elijah Chase has met all established PT POC goals at this time and is able to continue with his HEP IND for further progress/management. Therefore, skilled PT services are no longer warranted/necessary. Elijah Chase to be discharged from PT services and back to care under referring physician. Elijah Chase voiced agreement and satisfaction with this plan.     Plan: Discharge to Independent Home Program for further maintenance of functional mobility.      Primary Dx: Abnormality of gait and mobility  Problem List Items Addressed This Visit             ICD-10-CM    Weakness due to acute stroke  (Multi) I63.9, R53.1     Other Visit Diagnoses         Codes    Abnormality of gait and mobility     R26.9    Impairment of balance     R26.89            Subjective    Patient states that he is walking for exercise ~3400 steps every other day  He states that he goes to the gym at least 2x/week in which recumbent stepper, lifting weights, leg extension. He is performing home exercises every other day, he is currently using green TB for all banded activities. He states that he feels stronger. He reports about 80-90% improvement in functional mobility since start of plan of care.   He states that he walks without device at home.    Precautions   STEADI score of 6 (fall risk)    Pain   Denies pain.     Objective     Outcome Measures:  FGA - Functional Gait Assessment  Gait level surface: 3 (7 sec)  Change in gait speed: 3  Gait with horizontal head turns: 2  Gait with vertical head turns: 3  Gait and pivot turn: 2  Step over obstacle: 3  Gait with narrow base of support: 2  Gait with eyes closed: 3 (12.3 sec)  Ambulating backwards: 2 (23 sec)  Steps: 3  FGA Total Score: 26    Timed Up and Go Test  TUG Comment: 8.31 sec    Other Measures  5x Sit to Stand: 8.52 sec  10M Walk Test: 8.14 sec normal pace and 6.70 sec fast pace without device (1.22 m/s normal pace)  Activities - Specific Balance Confidence Scale: 1230    Functional Gait Assessment:  <23/30 = fall risk in older adults  age 60-69 average score 27.1  age 70-79 average score 24.9  age 80-89 average score 20.8    FIVE TIMES SIT TO STAND  UE Use: None Armrests  Age Average Time  50-59  7.7 sec  60-69  7.8 sec  70-79  9.3 sec  80-85  10.8 sec  Fall Risk  Geriatrics >/= 12 sec  CVA          >/= 12 sec    10MWT = 8.14 sec  GAIT SPEED =  1.22 m/s  Fall Risk = walking speed less than or equal to 1.0 m/s  1.3-1.5 m/sac healthy young adult  0.9-1.2 m/sec older adult  0 to.4 m/s = household  .4 to .8 m/s = limited community  .8 to 1.2 m/s = community  1.2 to 1.4 m/s =  normal and can cross street  MDC= 0.1 m/s, with improvements of 0.2 m/s linked to improved health  outcomes, fewer hospitalizations and fewer physician visits      Treatments:  Therapeutic Activity: 30 minutes   Assessment of pt's POC goals completed today- see objective, goals, and assessment section.   Educated pt regarding results of examination findings and discussed next steps in regard to discharge from skilled OP PT.   Educated pt regarding importance of continuing with good HEP compliance for optimal management of functional mobility.  Updated and issued printed home program to allow for maintenance of strength, balance, and overall functional mobility     Therapeutic Exercises:  20 min  - Sit to stand x 10  - lateral monster walks with blue TB 2 x 14' d/b  - fwd/bkwd monster walks with blue TB 2 x 14' ea  - Heel walking 2 x 14' d/b  - Toe walking 2 x 14' d/b  - braid walking x 14' d/b  - lunges with UE support x 12 R/L   - split stance squat with large medball x 10      EDUCATION:  HOME EXERCISE PROGRAM:  Access Code: [email or web address]  URL: [email or web address]  Date: 10/23/2024  Prepared by: Katt Banks    Exercises  - Sit to Stand  - 1 x daily - 7 x weekly - 3 sets - 10 reps  - Standing Hip Abduction with Counter Support  - 1 x daily - 7 x weekly - 3 sets - 10 reps  - Standing Hip Extension with Counter Support  - 1 x daily - 7 x weekly - 3 sets - 10 reps  - Standing March with Counter Support  - 1 x daily - 7 x weekly - 3 sets - 10 reps  - Heel Toe Raises with Counter Support  - 1 x daily - 7 x weekly - 3 sets - 10 reps  - Lunge with Counter Support  - 1 x daily - 7 x weekly - 3 sets - 10 reps  - Staggered Stance Squat  - 1 x daily - 7 x weekly - 3 sets - 10 reps  - Side Stepping with Resistance at Feet  - 1 x daily - 7 x weekly - 3 sets - 10 reps  - Braided Sidestepping with Arms Out  - 1 x daily - 7 x weekly - 3 sets - 10 reps  - Forward and Backward Monster Walk with Resistance at Ankles and  Counter Support  - 1 x daily - 7 x weekly - 3 sets - 10 reps  - Diagonal Step Up Coordination  - 1 x daily - 7 x weekly - 3 sets - 10 reps      Goals:  Resolved       PT Problem       PT Goal 1 (Met)       Start:  05/20/24    Expected End:  08/18/24    Resolved:  06/24/24    Elijah Chase will complete TUG within 12 seconds or less (indicative of higher fall risk) in order to increase balance and enhance safety during ADLs/ IADLs.          PT Goal 2 (Met)       Start:  05/20/24    Expected End:  11/29/24    Resolved:  10/23/24    Patient will perform 5xSTS test in </= 11 sec to demonstrate improved LE strength and decrease risk of falls         PT Goal 3 (Met)       Start:  05/20/24    Expected End:  11/29/24    Resolved:  10/23/24    Patient will score >/= 20/30 on FGA to meet MDC for stroke population and demonstrate improvement in balance and functional mobility         PT Goal 4 (Met)       Start:  05/20/24    Expected End:  08/18/24    Resolved:  06/24/24    Elijah Chase  will increase the 10MWT score by >/=0.16m/s (MCID) and > 0.8 m/s which is predictive of community ambulation post stroke.   Gait speed on the 10MWT in regards to ambulation classification: </= 0.4 m/s household ambulator; 0.4 m/s-0.8 m/s limited community ambulation; > 0.8 m/s community ambulator)          PT Goal 5 (Met)       Start:  05/20/24    Expected End:  11/29/24    Resolved:  10/23/24    Elijah Chase will demonstrate independence and report compliance with HEP to facilitate independent rehab program upon discharge.         Patient Stated Goal 1 (Met)       Start:  05/20/24    Expected End:  11/29/24    Resolved:  10/23/24    Get stronger

## 2024-11-01 ENCOUNTER — APPOINTMENT (OUTPATIENT)
Dept: PHYSICAL MEDICINE AND REHAB | Facility: CLINIC | Age: 64
End: 2024-11-01
Payer: COMMERCIAL

## 2024-11-01 VITALS — DIASTOLIC BLOOD PRESSURE: 77 MMHG | SYSTOLIC BLOOD PRESSURE: 121 MMHG | RESPIRATION RATE: 18 BRPM | HEART RATE: 61 BPM

## 2024-11-01 DIAGNOSIS — Z91.89 AT RISK FOR IMPAIRMENT OF SLEEP: ICD-10-CM

## 2024-11-01 DIAGNOSIS — M53.3 SACROILIAC JOINT DYSFUNCTION OF LEFT SIDE: ICD-10-CM

## 2024-11-01 DIAGNOSIS — I63.511 CEREBROVASCULAR ACCIDENT (CVA) DUE TO STENOSIS OF RIGHT MIDDLE CEREBRAL ARTERY (MULTI): ICD-10-CM

## 2024-11-01 DIAGNOSIS — I69.354 SPASTIC HEMIPLEGIA OF LEFT NONDOMINANT SIDE AS LATE EFFECT OF CEREBRAL INFARCTION (MULTI): ICD-10-CM

## 2024-11-01 DIAGNOSIS — G81.94 LEFT HEMIPARESIS (MULTI): ICD-10-CM

## 2024-11-01 DIAGNOSIS — M75.22 BICEPS TENDONITIS, LEFT: ICD-10-CM

## 2024-11-01 DIAGNOSIS — M54.16 LEFT LUMBAR RADICULITIS: Primary | ICD-10-CM

## 2024-11-01 PROCEDURE — 3074F SYST BP LT 130 MM HG: CPT | Performed by: PHYSICAL MEDICINE & REHABILITATION

## 2024-11-01 PROCEDURE — 3078F DIAST BP <80 MM HG: CPT | Performed by: PHYSICAL MEDICINE & REHABILITATION

## 2024-11-01 PROCEDURE — 3048F LDL-C <100 MG/DL: CPT | Performed by: PHYSICAL MEDICINE & REHABILITATION

## 2024-11-01 PROCEDURE — 1159F MED LIST DOCD IN RCRD: CPT | Performed by: PHYSICAL MEDICINE & REHABILITATION

## 2024-11-01 PROCEDURE — 1125F AMNT PAIN NOTED PAIN PRSNT: CPT | Performed by: PHYSICAL MEDICINE & REHABILITATION

## 2024-11-01 PROCEDURE — 1123F ACP DISCUSS/DSCN MKR DOCD: CPT | Performed by: PHYSICAL MEDICINE & REHABILITATION

## 2024-11-01 PROCEDURE — 3060F POS MICROALBUMINURIA REV: CPT | Performed by: PHYSICAL MEDICINE & REHABILITATION

## 2024-11-01 PROCEDURE — 4010F ACE/ARB THERAPY RXD/TAKEN: CPT | Performed by: PHYSICAL MEDICINE & REHABILITATION

## 2024-11-01 PROCEDURE — 99214 OFFICE O/P EST MOD 30 MIN: CPT | Performed by: PHYSICAL MEDICINE & REHABILITATION

## 2024-11-01 PROCEDURE — 3044F HG A1C LEVEL LT 7.0%: CPT | Performed by: PHYSICAL MEDICINE & REHABILITATION

## 2024-11-01 RX ORDER — ACETAMINOPHEN 500 MG
1000 TABLET ORAL 3 TIMES DAILY PRN
Qty: 30 TABLET | Refills: 2 | Status: SHIPPED | OUTPATIENT
Start: 2024-11-01 | End: 2025-01-30

## 2024-11-01 RX ORDER — TRAZODONE HYDROCHLORIDE 50 MG/1
50 TABLET ORAL NIGHTLY
Qty: 30 TABLET | Refills: 1 | Status: SHIPPED | OUTPATIENT
Start: 2024-11-01 | End: 2024-12-01

## 2024-11-01 ASSESSMENT — PAIN SCALES - GENERAL: PAINLEVEL_OUTOF10: 8

## 2024-11-01 NOTE — PROGRESS NOTES
HPI  Mr Elijah Chase, a 64 y.o. year old male with pmhx of HTN and DM here with his daughter and brother here for evaluation of stroke. Patient had a right sided stroke 3/8/24 at OSH in [place]. Patient went to inpatient rehabilitation at [facility] for 2 weeks and completed IPR on 3/31/24. They did not set patient up with PT or OT as they were not within the state. Has not been doing formal therapy since discharge from IPR. Deficits following stroke include left sided weakness, difficulties with swallowing, speech, cognition but symptoms have been improving. Reports dysuria, as well as difficulties with urinary urgency, incontinence, foul smelling urine. No urinary issues prior to CVA. Currently urinating with bedside urinal. Also having right sided flank pain. Also having difficulties with vision on left, possible neglect on the left side. Denies any spasticity.    Reports depressed mood, crying, difficulty adjusting. Also having difficulties with sleep. Difficulties staying asleep, taking melatonin sporadically from daughter.     They do not have imaging or records from hospitalization or IPR with him      He was last seen in April 2024. At that time we discussed:  -New PT and OT referrals placed, emphasis on neurologic aspect of therapy  -already has ST ordered through PCP for speech and cog, dysarthria, word finding difficulties. Per family is on normal diet.  -Psych referral placed for adjustment  -UA ordered due to dysuria, discolored urine, frequency. Potential UTI although neurogenic bladder may be contributing  -Handicap placard ordered  -Agree with establishing with neurology, PCP placed neuro referral. Discussed to get records from inpatient hospitalization re location of stroke etc  -BMP ordered for baseline lab and kidney function  -Follow up in 6-8 weeks     Completed outpatient therapy  and does walking and leg exercises but not doing arm and hand exercises.      Has a hard time with sleep, wakes  up and hard to go back to sleep. Melatonin wo relief.  Difficulty w concentration.    Back pain comes and goes. Started one month ago. Middle low back pain,. Comes and goes.  Left leg pain.  Takies       Left shoulder pain anterior/biceps comes and goes. Since the stroke        IMAGING: Had done at OSH, no records in system, unclear location of stroke, likely right sided with left sided deficits based on presentation     FUNCTIONAL HISTORY: Using FWW to ambulate, was independent prior to CVA     SH:  Patient ambulates with a FWW. Patient was completed independent prior to CVA. Does need help with dressing. Has rails for bathroom. He is currently living with his daughter and daughters , 1 JANNA, bed and bath on first floor. 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 DP pulses 2+ bilaterally  ABD- soft, ND  SKIN - No rash.    Mild dysarthria, delayed recall/processing     NEURO - UE strength 4+/5 on left, 5/5 on right-  including shoulder abduction, biceps, triceps, wrist extensors, finger flexors, interossei, and   LE strength 4+/5 on left, 5/5 on right -  including hip flexors, knee flexors, knee extensors, ankle dorsiflexors, ankle plantar flexors, and EHL  Sensation - intact to light touch in bilateral lower extremities.  GAIT - Uses cane, spastic gait pattern  Dysdiadochokinesia with difficulty on right, F-N difficulty L worse than right  CN testing: Left sided facial droop, otherwise in tact in cranial nerves (other than VII)  Flat affect    Ttp left biceps  + speeds  Flexion and abduction passive 120 degrees  active 160  - impingement    Ttp l spine left more than R  + sit slump test on L    Impression/PLAN:  Elijah Chase is a 64 year old male with pmhx of HTN, DM here for evaluation of impairments after stroke,  likely right sided with left sided deficits from OSH.  Deficits include left sided weakness, speech, cognitive, swallowing difficulties. These have all improved w therapy but continues w spastic gait, now worsening sleep, fatigue and depressed mood.  Also difficulties with depressed mood. Now also presents w left lumbar radiculitis and left shoulder biceps tendonitis.    Orders Placed This Encounter   Procedures    XR lumbar spine complete 4+ views    XR shoulder left 2+ views    Referral to Psychology    Referral to Physical Therapy    Referral to [facility]      Encounter Diagnoses   Name Primary?    Left lumbar radiculitis Yes    Biceps tendonitis, left     At risk for impairment of sleep     Left hemiparesis (Multi)     Cerebrovascular accident (CVA) due to stenosis of right middle cerebral artery (Multi)     Spastic hemiplegia of left nondominant side as late effect of cerebral infarction (Multi)     Sacroiliac joint dysfunction of left side        Stroke Rehab  -completed outpatient speech, PT and OT 2024  --Psych referral placed for adjustment however per daughter would be over 6 month wait. Placed an outside referral for stroke rehab psychology at [facility].  -Handicap placard ordered prior for daughter who is caretaker and poa  -continue fu w neuro  - difficulty w sleep, start trazodone, melatonin wo relief  - once sleep is better might benefit from ssri but patient and family hesitant what to try therapy first     Left lumbar radiculitis, sij dysfunction  - xr l spine  - PT for core rom hep  - referral for chiropractic eval    L biceps tendonitis  - xr left shoulder  - pt   - volataren gel  - tylenol 1000 mg tid prn, discussed no otc nsaids since in claudia Randle,  MD     Division of PM&R

## 2024-11-05 ENCOUNTER — HOSPITAL ENCOUNTER (OUTPATIENT)
Dept: RADIOLOGY | Facility: CLINIC | Age: 64
Discharge: HOME | End: 2024-11-05
Payer: COMMERCIAL

## 2024-11-05 ENCOUNTER — LAB (OUTPATIENT)
Dept: LAB | Facility: LAB | Age: 64
End: 2024-11-05
Payer: COMMERCIAL

## 2024-11-05 ENCOUNTER — APPOINTMENT (OUTPATIENT)
Dept: PRIMARY CARE | Facility: CLINIC | Age: 64
End: 2024-11-05
Payer: COMMERCIAL

## 2024-11-05 VITALS
SYSTOLIC BLOOD PRESSURE: 122 MMHG | BODY MASS INDEX: 24.37 KG/M2 | HEART RATE: 54 BPM | OXYGEN SATURATION: 100 % | WEIGHT: 165 LBS | DIASTOLIC BLOOD PRESSURE: 80 MMHG

## 2024-11-05 DIAGNOSIS — E78.49 OTHER HYPERLIPIDEMIA: ICD-10-CM

## 2024-11-05 DIAGNOSIS — M54.16 LEFT LUMBAR RADICULITIS: ICD-10-CM

## 2024-11-05 DIAGNOSIS — I10 PRIMARY HYPERTENSION: Primary | ICD-10-CM

## 2024-11-05 DIAGNOSIS — E11.9 TYPE 2 DIABETES MELLITUS WITHOUT COMPLICATION, WITHOUT LONG-TERM CURRENT USE OF INSULIN (MULTI): ICD-10-CM

## 2024-11-05 DIAGNOSIS — I69.328 SPEECH OR LANGUAGE DEFICIT, POST-STROKE: ICD-10-CM

## 2024-11-05 DIAGNOSIS — I63.511 CEREBROVASCULAR ACCIDENT (CVA) DUE TO STENOSIS OF RIGHT MIDDLE CEREBRAL ARTERY (MULTI): ICD-10-CM

## 2024-11-05 LAB
ALBUMIN SERPL BCP-MCNC: 4.5 G/DL (ref 3.4–5)
ALP SERPL-CCNC: 108 U/L (ref 33–136)
ALT SERPL W P-5'-P-CCNC: 18 U/L (ref 10–52)
ANION GAP SERPL CALC-SCNC: 14 MMOL/L (ref 10–20)
AST SERPL W P-5'-P-CCNC: 28 U/L (ref 9–39)
BILIRUB SERPL-MCNC: 0.8 MG/DL (ref 0–1.2)
BUN SERPL-MCNC: 15 MG/DL (ref 6–23)
CALCIUM SERPL-MCNC: 9.7 MG/DL (ref 8.6–10.6)
CHLORIDE SERPL-SCNC: 101 MMOL/L (ref 98–107)
CO2 SERPL-SCNC: 27 MMOL/L (ref 21–32)
CREAT SERPL-MCNC: 0.77 MG/DL (ref 0.5–1.3)
EGFRCR SERPLBLD CKD-EPI 2021: >90 ML/MIN/1.73M*2
EST. AVERAGE GLUCOSE BLD GHB EST-MCNC: 120 MG/DL
GLUCOSE SERPL-MCNC: 81 MG/DL (ref 74–99)
HBA1C MFR BLD: 5.8 %
POTASSIUM SERPL-SCNC: 4.5 MMOL/L (ref 3.5–5.3)
PROT SERPL-MCNC: 7.1 G/DL (ref 6.4–8.2)
SODIUM SERPL-SCNC: 137 MMOL/L (ref 136–145)

## 2024-11-05 PROCEDURE — 1123F ACP DISCUSS/DSCN MKR DOCD: CPT | Performed by: FAMILY MEDICINE

## 2024-11-05 PROCEDURE — 3079F DIAST BP 80-89 MM HG: CPT | Performed by: FAMILY MEDICINE

## 2024-11-05 PROCEDURE — 72110 X-RAY EXAM L-2 SPINE 4/>VWS: CPT

## 2024-11-05 PROCEDURE — 36415 COLL VENOUS BLD VENIPUNCTURE: CPT

## 2024-11-05 PROCEDURE — 73030 X-RAY EXAM OF SHOULDER: CPT | Mod: LEFT SIDE | Performed by: RADIOLOGY

## 2024-11-05 PROCEDURE — 4010F ACE/ARB THERAPY RXD/TAKEN: CPT | Performed by: FAMILY MEDICINE

## 2024-11-05 PROCEDURE — 80053 COMPREHEN METABOLIC PANEL: CPT

## 2024-11-05 PROCEDURE — 72110 X-RAY EXAM L-2 SPINE 4/>VWS: CPT | Performed by: RADIOLOGY

## 2024-11-05 PROCEDURE — 3060F POS MICROALBUMINURIA REV: CPT | Performed by: FAMILY MEDICINE

## 2024-11-05 PROCEDURE — 73030 X-RAY EXAM OF SHOULDER: CPT | Mod: LT

## 2024-11-05 PROCEDURE — 3044F HG A1C LEVEL LT 7.0%: CPT | Performed by: FAMILY MEDICINE

## 2024-11-05 PROCEDURE — 1160F RVW MEDS BY RX/DR IN RCRD: CPT | Performed by: FAMILY MEDICINE

## 2024-11-05 PROCEDURE — 3048F LDL-C <100 MG/DL: CPT | Performed by: FAMILY MEDICINE

## 2024-11-05 PROCEDURE — 83036 HEMOGLOBIN GLYCOSYLATED A1C: CPT

## 2024-11-05 PROCEDURE — 99214 OFFICE O/P EST MOD 30 MIN: CPT | Performed by: FAMILY MEDICINE

## 2024-11-05 PROCEDURE — 3074F SYST BP LT 130 MM HG: CPT | Performed by: FAMILY MEDICINE

## 2024-11-05 PROCEDURE — G2211 COMPLEX E/M VISIT ADD ON: HCPCS | Performed by: FAMILY MEDICINE

## 2024-11-05 PROCEDURE — 1159F MED LIST DOCD IN RCRD: CPT | Performed by: FAMILY MEDICINE

## 2024-11-05 RX ORDER — METFORMIN HYDROCHLORIDE 500 MG/1
500 TABLET, EXTENDED RELEASE ORAL DAILY
Qty: 90 TABLET | Refills: 1 | Status: SHIPPED | OUTPATIENT
Start: 2024-11-05

## 2024-11-05 RX ORDER — ATORVASTATIN CALCIUM 80 MG/1
80 TABLET, FILM COATED ORAL DAILY
Qty: 90 TABLET | Refills: 3 | Status: SHIPPED | OUTPATIENT
Start: 2024-11-05 | End: 2024-11-05 | Stop reason: SDUPTHER

## 2024-11-05 RX ORDER — ATORVASTATIN CALCIUM 80 MG/1
80 TABLET, FILM COATED ORAL DAILY
Qty: 90 TABLET | Refills: 3 | Status: SHIPPED | OUTPATIENT
Start: 2024-11-05

## 2024-11-05 RX ORDER — LISINOPRIL 20 MG/1
20 TABLET ORAL DAILY
Qty: 90 TABLET | Refills: 1 | Status: SHIPPED | OUTPATIENT
Start: 2024-11-05

## 2024-11-05 ASSESSMENT — PATIENT HEALTH QUESTIONNAIRE - PHQ9
2. FEELING DOWN, DEPRESSED OR HOPELESS: SEVERAL DAYS
SUM OF ALL RESPONSES TO PHQ QUESTIONS 1-9: 12
9. THOUGHTS THAT YOU WOULD BE BETTER OFF DEAD, OR OF HURTING YOURSELF: NOT AT ALL
7. TROUBLE CONCENTRATING ON THINGS, SUCH AS READING THE NEWSPAPER OR WATCHING TELEVISION: SEVERAL DAYS
1. LITTLE INTEREST OR PLEASURE IN DOING THINGS: MORE THAN HALF THE DAYS
5. POOR APPETITE OR OVEREATING: SEVERAL DAYS
SUM OF ALL RESPONSES TO PHQ9 QUESTIONS 1 AND 2: 3
8. MOVING OR SPEAKING SO SLOWLY THAT OTHER PEOPLE COULD HAVE NOTICED. OR THE OPPOSITE, BEING SO FIGETY OR RESTLESS THAT YOU HAVE BEEN MOVING AROUND A LOT MORE THAN USUAL: MORE THAN HALF THE DAYS
3. TROUBLE FALLING OR STAYING ASLEEP OR SLEEPING TOO MUCH: SEVERAL DAYS
10. IF YOU CHECKED OFF ANY PROBLEMS, HOW DIFFICULT HAVE THESE PROBLEMS MADE IT FOR YOU TO DO YOUR WORK, TAKE CARE OF THINGS AT HOME, OR GET ALONG WITH OTHER PEOPLE: SOMEWHAT DIFFICULT
4. FEELING TIRED OR HAVING LITTLE ENERGY: NEARLY EVERY DAY
6. FEELING BAD ABOUT YOURSELF - OR THAT YOU ARE A FAILURE OR HAVE LET YOURSELF OR YOUR FAMILY DOWN: SEVERAL DAYS

## 2024-11-05 ASSESSMENT — ANXIETY QUESTIONNAIRES
GAD7 TOTAL SCORE: 10
2. NOT BEING ABLE TO STOP OR CONTROL WORRYING: NEARLY EVERY DAY
7. FEELING AFRAID AS IF SOMETHING AWFUL MIGHT HAPPEN: SEVERAL DAYS
4. TROUBLE RELAXING: SEVERAL DAYS
3. WORRYING TOO MUCH ABOUT DIFFERENT THINGS: NEARLY EVERY DAY
5. BEING SO RESTLESS THAT IT IS HARD TO SIT STILL: NOT AT ALL
6. BECOMING EASILY ANNOYED OR IRRITABLE: SEVERAL DAYS
1. FEELING NERVOUS, ANXIOUS, OR ON EDGE: SEVERAL DAYS
IF YOU CHECKED OFF ANY PROBLEMS ON THIS QUESTIONNAIRE, HOW DIFFICULT HAVE THESE PROBLEMS MADE IT FOR YOU TO DO YOUR WORK, TAKE CARE OF THINGS AT HOME, OR GET ALONG WITH OTHER PEOPLE: NOT DIFFICULT AT ALL

## 2024-11-05 NOTE — PROGRESS NOTES
"Subjective   Patient ID: Elijah Chase \"Gene\" is a 64 y.o. male who presents for Follow-up.    HPI    DM TYPE 2:  DIABETES PREVENTATIVE CARE:  Fasting Labs:    6/18/2024  Optho Care: scheduled for 4/16/2025  Foot Exam: denies numbness or tingling, self skin care  Hba1c:   Hemoglobin A1C (%)   Date Value   11/05/2024 5.8 (H)     Microalbumin:   Albumin (g/dL)   Date Value   11/05/2024 4.5      HTN:  BP controlled today  Taking medications as directed - no side effects noted.  Denies CP, SOB, Edema, palpitations or headache.     H/P CVA: followed by Dr. Lali De Paz - next appointment appears to be scheduled for 1/6/2025  Review of recent appointment with PMR, Dr. Rnadle  New referrals for PT and OT were placed  New referral for psychiatry at [facility]    He is currently not back to work - needing supportive letter to remain off work due to residual symptoms from his CVA.    Review of Systems    Review of Systems negative except as noted in HPI and Chief complaint.     Objective       ANNA-7 Total Score: 10     VITALS:  /80 (BP Location: Left arm, Patient Position: Sitting, BP Cuff Size: Adult)   Pulse 54   Wt 74.8 kg (165 lb)   SpO2 100%   BMI 24.37 kg/m²      Physical Exam  Constitutional:       General: He is not in acute distress.     Appearance: Normal appearance. He is not ill-appearing.   HENT:      Head: Normocephalic and atraumatic.   Neck:      Vascular: No carotid bruit.   Cardiovascular:      Rate and Rhythm: Normal rate and regular rhythm.      Pulses: Normal pulses.      Heart sounds: Normal heart sounds. No murmur heard.     No gallop.   Pulmonary:      Effort: Pulmonary effort is normal.      Breath sounds: Normal breath sounds. No wheezing, rhonchi or rales.   Musculoskeletal:      Cervical back: Normal range of motion and neck supple. No rigidity or tenderness.   Lymphadenopathy:      Cervical: No cervical adenopathy.   Skin:     General: Skin is warm and dry.   Neurological:      Mental Status: " He is alert.   Psychiatric:         Mood and Affect: Mood normal.         Behavior: Behavior normal.      Comments: Poor historian, accompanied by his daughter today.       Assessment/Plan   Problem List Items Addressed This Visit       Primary hypertension - Primary    Relevant Medications    lisinopril 20 mg tablet    ticagrelor (Brilinta) 90 mg tablet    Cerebrovascular accident (CVA) due to stenosis of right middle cerebral artery (Multi)    Relevant Orders    Referral to Occupational Medicine    Referral to Speech Therapy     Other Visit Diagnoses       Other hyperlipidemia        Relevant Medications    atorvastatin (Lipitor) 80 mg tablet    Type 2 diabetes mellitus without complication, without long-term current use of insulin (Multi)        Relevant Medications    metFORMIN  mg 24 hr tablet    Other Relevant Orders    Comprehensive Metabolic Panel (Completed)    Hemoglobin A1C (Completed)    Speech or language deficit, post-stroke        Relevant Orders    Referral to Speech Therapy            FOLLOW UP  3-4 months , SOONER WITH ANY PROBLEMS OR CONCERNS.      FED EX requires monthly note to say that he is still off due to P.T.

## 2024-11-12 ENCOUNTER — TELEPHONE (OUTPATIENT)
Dept: PRIMARY CARE | Facility: CLINIC | Age: 64
End: 2024-11-12
Payer: COMMERCIAL

## 2024-11-12 NOTE — TELEPHONE ENCOUNTER
Spoke with patient's daughter, she states that she is noticing him sweating a lot on his forehead when he is eating his food. She is asking if this is normal for DM? Or is there something else she should be looking at?

## 2024-11-28 DIAGNOSIS — Z91.89 AT RISK FOR IMPAIRMENT OF SLEEP: ICD-10-CM

## 2024-12-03 ENCOUNTER — APPOINTMENT (OUTPATIENT)
Dept: INTEGRATIVE MEDICINE | Facility: CLINIC | Age: 64
End: 2024-12-03
Payer: COMMERCIAL

## 2024-12-03 DIAGNOSIS — M99.03 SEGMENTAL AND SOMATIC DYSFUNCTION OF LUMBAR REGION: Primary | ICD-10-CM

## 2024-12-03 DIAGNOSIS — M54.16 LEFT LUMBAR RADICULOPATHY: ICD-10-CM

## 2024-12-03 DIAGNOSIS — M25.512 LEFT ANTERIOR SHOULDER PAIN: ICD-10-CM

## 2024-12-03 DIAGNOSIS — M99.02 SEGMENTAL AND SOMATIC DYSFUNCTION OF THORACIC REGION: ICD-10-CM

## 2024-12-03 DIAGNOSIS — M99.05 SEGMENTAL AND SOMATIC DYSFUNCTION OF PELVIC REGION: ICD-10-CM

## 2024-12-03 DIAGNOSIS — M99.04 SEGMENTAL AND SOMATIC DYSFUNCTION OF SACRAL REGION: ICD-10-CM

## 2024-12-03 DIAGNOSIS — M54.6 DORSALGIA OF THORACIC REGION: ICD-10-CM

## 2024-12-03 PROCEDURE — 98941 CHIROPRACT MANJ 3-4 REGIONS: CPT | Performed by: CHIROPRACTOR

## 2024-12-03 PROCEDURE — 99203 OFFICE O/P NEW LOW 30 MIN: CPT | Performed by: CHIROPRACTOR

## 2024-12-03 RX ORDER — TRAZODONE HYDROCHLORIDE 50 MG/1
50 TABLET ORAL NIGHTLY
Qty: 90 TABLET | Refills: 1 | Status: SHIPPED | OUTPATIENT
Start: 2024-12-03 | End: 2025-01-02

## 2024-12-03 NOTE — PROGRESS NOTES
"Subjective   Patient ID: Elijah Chase \"Ricardo" is a 64 y.o. male who presents today, December 3, 2024 for a new patient evaluation and treatment of left sided shoulder pain and low back pain.    (1) Medicare    Chiropractic Medicine HPI:  Provacative factors include : lifting  Palliative factors incude : movement  Pain is described as : sharp, stabbing, numbness/tingling   Previous treatment for complaint has included : Physical Therapy  Patient denies : bladder weakness, bowel weakness, difficulty walking, catching, clicking, grinding, popping  Patient rates severity of pain at : 7/10 on a numerical pain scale  Completed Oswestry Disability Index prior to visit: yes     Initial exam:   Elijah \"Myriam Chase presents for an initial evaluation and treatment of left sided shoulder pain and low back pain. He states that all his symptoms started in March 2024 following a stroke.. He states that his stroke effected the left side of his body and he has been having complaints along that side ever since. He states that he has been going through physical therapy and many other supportive treatments following his stroke episode. He states that he has been experiencing decreased range motion with some numbness and tingling into the arm. He states that his low back pain also comes and goes. He states that he experiences predominately localized discomfort in the low back, with intermittent numbness and tingling into the left lower extremity. He remarks that he has been experiencing constipation issues since his stroke as well. He also states that he utilizes a cane when ambulating but has become more stable since his stroke.    FINDINGS:  Mild multilevel spondylosis throughout the visualized lumbar spine.  No fracture or subluxation. Soft tissues intact.      IMPRESSION:  Mild lumbar spondylosis.      Objective   Review of Systems   Constitutional: Negative.    HENT: Negative.     Eyes: Negative.    Respiratory: Negative.   "   Cardiovascular: Negative.    Gastrointestinal:  Positive for constipation.   Musculoskeletal:  Positive for back pain, gait problem, myalgias, neck pain and neck stiffness.   Neurological:  Positive for weakness and numbness.   Hematological: Negative.    Psychiatric/Behavioral:  Positive for sleep disturbance.    All other systems reviewed and are negative.    Physical Exam  Neurological:      General: No focal deficit present.      Mental Status: He is alert and oriented to person, place, and time.      Cranial Nerves: No dysarthria or facial asymmetry.      Sensory: Sensation is intact.      Motor: Motor function is intact.      Coordination: Coordination is intact.      Gait: Gait is intact.        Spine Musculoskeletal Exam    Gait    Gait is normal.    Inspection    Leg length disparity: no discrepancy    Sagittal balance: anterior imbalance    Palpation    Thoracolumbar    Tenderness: present      Diffuse: yes      Sciatic notch: left      Paraspinous: left      Gluteal: left      Iliac crest: left      Piriformis: left      SI Joint: left      Sacrum: left      Spinous process: lower lumbar    Right      Masses: none      Spasms: none      Crepitus: none      Muscle tone: normal    Left      Masses: none      Spasms: none      Crepitus: none      Muscle tone: increased    Range of Motion    Thoracolumbar       Flexion: 75%. Flexion detail: no pain and guarding.     Extension: 50%. Extension detail: pain and guarding.       Right      Lateral bendin%. Lateral bending detail: no pain.       Lateral rotation: 75%. Lateral rotation detail: no pain.       Left      Lateral bendin%. Lateral bending detail: no pain.       Lateral rotation: 75%. Lateral rotation detail: no pain.      Strength    Thoracolumbar       Right      Extensor hallucis longus: 5/5.       Tibialis anterior: 5/5.       Tibialis posterior: 5/5.       Plantar flexion: 5/5.       Peroneals: 5/5.       Quadriceps: 5/5.       Hamstring:  5/5.       Hip abductors: 5/5.       Hip flexion: 5/5.       Hip adduction: 5/5.        Left      Extensor hallucis longus: 4/5.       Tibialis anterior: 4/5.       Tibialis posterior: 4/5.       Plantar flexion: 4/5.       Peroneals: 4/5.       Quadriceps: 4/5.       Hamstrin/5.       Hip abductors: 4/5.       Hip flexion: 4/5.       Hip adduction: 4/5.      General    Neurological: alert     Orthopedic Tests:  Thoracic/Lumbar/Sacrum:   Lumbosacral Avila's  Bilateral, Positive, and with local pain.  FABERE's Left, Positive, and with local pain.  Yeoman's Bilateral, Positive, and with local pain.  Nachlas' Bilateral, Positive, with local pain, and with tightness.    Segmental Joint(s): Segmental joint dysfunction was assessed with motion palpation and is identified in the following areas:  Lumbopelvic / Sacral SIJ : L4, L5/S1, and R SIJ    Assessment/Plan   Today's Treatment Included: Spinal manipulation to the following regions of segmental dysfunction identified on examination, using age-appropriate and injury specific force.  Segmental Joint(s) Thoracic : T1, T2., and T10 Segmental Joint(s) Lumbopelvic/Sacral SIJ : L4, L5/S1, R SIJ, and L SIJ  Chiropractic manipulation treatment techniques utilized: Direct Non-force/Joint Mobilization technique  Soft tissue mobilization techniques utilized during treatment: Ischemic compression and Manual Dynamic Stretching    Soft-tissue mobilization was performed in the following areas:   Cervical paraspinal mm. left, Scalenes left, and Upper Trapezius left  Thoracic Paraspinal mm. left, Levator Scap. left, Middle Trapezius left, Rhomboids left, Pectoralis left, Subscapularis left, and Latissimus Dorsi left  Lumbar Paraspinal mm. left, Quadratus Lumborum left, Gluteal mm. Glute. Max.  and Glute. Med. left, Piriformis left, and Psoas left    Treatment Plan:   The patient and I discussed the risks and benefits of Chiropractic Care. Consent for care was given both written and  orally by the patient.  Based on the patient's subjective complaints along with the examination findings, it is advised that a course of Chiropractic Treatment by initiated in the form of:   Chiropractic Manipulation (CMT)  Neuro-Muscular Re-education  Integrative Dry Needing (IDN)  Chiropractic treatment recommended at a frequency of 1 times per week for 4 weeks  in conjunction with other providers and treatment modalities  The goals of the treatment will be to: decrease pain, increase activity, increase range of motion, reduce lower back pain, reduce neck pain, improve quality of life, improve ability to walk, reduce leg pain, reduce arm pain, decrease muscular hypertonicity, increase functional capacity, and improve postural strength  The patient tolerated today's treatment with little or no additional discomfort and was instructed to contact the office for questions or concerns.   Follow up as scheduled.

## 2024-12-09 ASSESSMENT — ENCOUNTER SYMPTOMS
EYES NEGATIVE: 1
SLEEP DISTURBANCE: 1
NECK STIFFNESS: 1
MYALGIAS: 1
RESPIRATORY NEGATIVE: 1
CARDIOVASCULAR NEGATIVE: 1
BACK PAIN: 1
HEMATOLOGIC/LYMPHATIC NEGATIVE: 1
NECK PAIN: 1
CONSTITUTIONAL NEGATIVE: 1
WEAKNESS: 1
NUMBNESS: 1
CONSTIPATION: 1

## 2024-12-11 ENCOUNTER — PATIENT MESSAGE (OUTPATIENT)
Dept: PRIMARY CARE | Facility: CLINIC | Age: 64
End: 2024-12-11
Payer: COMMERCIAL

## 2024-12-13 ENCOUNTER — APPOINTMENT (OUTPATIENT)
Dept: PHYSICAL MEDICINE AND REHAB | Facility: CLINIC | Age: 64
End: 2024-12-13
Payer: COMMERCIAL

## 2024-12-16 ENCOUNTER — APPOINTMENT (OUTPATIENT)
Dept: NEUROLOGY | Facility: CLINIC | Age: 64
End: 2024-12-16
Payer: COMMERCIAL

## 2025-01-06 ENCOUNTER — OFFICE VISIT (OUTPATIENT)
Dept: NEUROLOGY | Facility: CLINIC | Age: 65
End: 2025-01-06
Payer: MEDICARE

## 2025-01-06 ENCOUNTER — APPOINTMENT (OUTPATIENT)
Dept: INTEGRATIVE MEDICINE | Facility: CLINIC | Age: 65
End: 2025-01-06
Payer: COMMERCIAL

## 2025-01-06 VITALS
RESPIRATION RATE: 18 BRPM | WEIGHT: 168 LBS | HEART RATE: 74 BPM | SYSTOLIC BLOOD PRESSURE: 115 MMHG | DIASTOLIC BLOOD PRESSURE: 69 MMHG | BODY MASS INDEX: 24.81 KG/M2

## 2025-01-06 DIAGNOSIS — I10 PRIMARY HYPERTENSION: ICD-10-CM

## 2025-01-06 DIAGNOSIS — I69.319 COGNITIVE DEFICIT AS LATE EFFECT OF CEREBROVASCULAR ACCIDENT (CVA): ICD-10-CM

## 2025-01-06 DIAGNOSIS — I69.354 SPASTIC HEMIPLEGIA OF LEFT NONDOMINANT SIDE AS LATE EFFECT OF CEREBRAL INFARCTION (MULTI): Primary | ICD-10-CM

## 2025-01-06 DIAGNOSIS — Z79.4 TYPE 2 DIABETES MELLITUS WITH OTHER CIRCULATORY COMPLICATION, WITH LONG-TERM CURRENT USE OF INSULIN: ICD-10-CM

## 2025-01-06 DIAGNOSIS — E78.5 DYSLIPIDEMIA: ICD-10-CM

## 2025-01-06 DIAGNOSIS — Z95.828 PRESENCE OF INTERNAL CAROTID STENT: ICD-10-CM

## 2025-01-06 DIAGNOSIS — E11.59 TYPE 2 DIABETES MELLITUS WITH OTHER CIRCULATORY COMPLICATION, WITH LONG-TERM CURRENT USE OF INSULIN: ICD-10-CM

## 2025-01-06 PROBLEM — I66.01 STENOSIS OF RIGHT MIDDLE CEREBRAL ARTERY: Status: ACTIVE | Noted: 2025-01-06

## 2025-01-06 PROCEDURE — 1036F TOBACCO NON-USER: CPT | Performed by: PSYCHIATRY & NEUROLOGY

## 2025-01-06 PROCEDURE — 3078F DIAST BP <80 MM HG: CPT | Performed by: PSYCHIATRY & NEUROLOGY

## 2025-01-06 PROCEDURE — 1126F AMNT PAIN NOTED NONE PRSNT: CPT | Performed by: PSYCHIATRY & NEUROLOGY

## 2025-01-06 PROCEDURE — G2211 COMPLEX E/M VISIT ADD ON: HCPCS | Performed by: PSYCHIATRY & NEUROLOGY

## 2025-01-06 PROCEDURE — 99215 OFFICE O/P EST HI 40 MIN: CPT | Performed by: PSYCHIATRY & NEUROLOGY

## 2025-01-06 PROCEDURE — 1123F ACP DISCUSS/DSCN MKR DOCD: CPT | Performed by: PSYCHIATRY & NEUROLOGY

## 2025-01-06 PROCEDURE — 4010F ACE/ARB THERAPY RXD/TAKEN: CPT | Performed by: PSYCHIATRY & NEUROLOGY

## 2025-01-06 PROCEDURE — 1159F MED LIST DOCD IN RCRD: CPT | Performed by: PSYCHIATRY & NEUROLOGY

## 2025-01-06 PROCEDURE — 1160F RVW MEDS BY RX/DR IN RCRD: CPT | Performed by: PSYCHIATRY & NEUROLOGY

## 2025-01-06 PROCEDURE — 3074F SYST BP LT 130 MM HG: CPT | Performed by: PSYCHIATRY & NEUROLOGY

## 2025-01-06 ASSESSMENT — MINI MENTAL STATE EXAM
SUM ALL MMSE QUESTIONS FOR TOTAL SCORE [OUT OF 30].: 21
SHOW: PENCIL [OBJECT] ASK: WHAT IS THIS CALLED?: 2 CORRECT
HAND THE PERSON A PENCIL AND PAPER. SAY:  WRITE ANY COMPLETE SENTENCE ON THAT PIECE OF PAPER. (NOTE: THE SENTENCE MUST MAKE SENSE.  IGNORE SPELLING ERRORS): 1 CORRECT
PLACE DESIGN, ERASER AND PENCIL IN FRONT OF THE PERSON.  SAY:  COPY THIS DESIGN PLEASE.  SHOW: DESIGN. ALLOW: MULTIPLE TRIES. WAIT UNTIL PERSON IS FINISHED AND HANDS IT BACK. SCORE: ONLY FOR DIAGRAM WITH 4-SIDED FIGURE BETWEEN TWO 5-SIDED FIGURES: 0 CORRECT
PLEASE COPY THIS PICTURE (NOTE ALL 10 ANGLES MUST BE PRESENT AND TWO MUST INTERSECT): 1 CORRECT
WHAT IS THE YEAR, SEASON, DATE, DAY, AND MONTH: 4 CORRECT
SAY:  READ THE WORDS ON THE PAGE AND THEN DO WHAT IT SAYS.  THEN HAND THE PERSON THE SHEET WITH CLOSE YOUR EYES ON IT.  IF THE SUBJECT READS AND DOES NOT CLOSE THEIR EYES, REPEAT UP TO THREE TIMES.  SCORE ONLY IF SUBJECT CLOSES EYES.: 2 CORRECT
WHAT STATE, COUNTRY, CITY, HOSPITAL, FLOOR: 3 CORRECT
RECALL THE 3 OBJECTS FROM ABOVE (APPLE, TABLE, PENNY) OR (BALL, TREE, FLAG): 1 CORRECT
SAY: I WOULD LIKE YOU TO REPEAT THIS PHRASE AFTER ME: NO IFS, ANDS, OR BUTS.: 1 CORRECT
SPELL THE WORD WORLD FORWARD AND BACKWARDS OR SERIAL 7S: 3 CORRECT
NAME OR REPEAT 3 OBJECTS - (APPLE, TABLE, PENNY) OR (BALL, TREE, FLAG): 3 CORRECT

## 2025-01-06 ASSESSMENT — PAIN SCALES - GENERAL: PAINLEVEL_OUTOF10: 0-NO PAIN

## 2025-01-06 ASSESSMENT — ACTIVITIES OF DAILY LIVING (ADL)
TOILET_USE: INDEPENDENT (ON AND OFF, DRESSING, WIPING)
DRESSING: INDEPENDENT (INCLUDING BUTTONS, ZIPS, LACES,ETC.)
BED_TO_CHAIR_AND_BACK: INDEPENDENT
MOBILITY_LEVEL_SURFACES: INDEPENDENT (BUT MAY USE ANY AID FOR EXAMPLE, STICK) > 50 YARDS
STAIRS: INDEPENDENT
FEEDING: INDEPENDENT
BLADDER: CONTINENT
TOTAL_SCORE: 100
BATHING: INDEPENDENT (OR IN SHOWER)
BOWELS: INDEPENDENT (INCLUDING BUTTONS, ZIPS, LACES, ETC.)
GROOMING: INDEPENDENT FACE/HAIR/TEETH.SHAVING (IMPLEMENTS PROVIDED)

## 2025-01-06 NOTE — PROGRESS NOTES
[facility]   Elijah Chase is a 64 y.o. year old male presenting for follow-up .   PCP: Radha Underwood,     3/2024- presented in [place] with acute L hemiplegia on awakening treated with angioplasty/stenting RMCA stenosis then additional stent for cavernous JARETH dissection.  DC on DAPT, aspirin and brillinta.    4/2024- Established with PCP here- acknowledged DM, did not know medications.  Consult with Neurosurgery (Anthony) who recommended to continue DAPT with plan for repeat angio ~9/2024.   6/14/24- Consult for stroke prevention and management.  Residual L UE, LE weakness in PT, OT.  Memory deficits in ST, family supervises medications.  Mood changes- frustrated with limitations.   Vascular risk factors- HTN, HPL, DM     01/06/25- Followup. Tearful throughout the visit, frustrated with limitations- gives examples of difficulty with directions and dependence on others.  Currently on long-term leave from work HR requesting update.   Typical day- household chores, goes to local gym on independent program, not driving,  Does puzzles but can't multitask, gets lost in directions, sometimes doesn't know where items belong, misplaces personal items frequently but doesn't utilize a system. Referral to Psychology from PMR specialist.    In October had angio showing RMCA and JARETH cavernous stents without residual stenosis but some submaximal positioning; recommended DAPT until 3/2025 then aspirin monotherapy thereafter.      Extensive review of notes in EMR, labs, tests, Interpretation of neuroimaging  As outlined   Lab Results   Component Value Date    CHOL 115 06/18/2024    TRIG 48 06/18/2024    HDL 39.6 06/18/2024    CHHDL 2.9 06/18/2024    VLDL 10 06/18/2024    NHDL 75 06/18/2024     Lab Results   Component Value Date    HGBA1C 5.8 (H) 11/05/2024     Lab Results   Component Value Date    GLUCOSE 81 11/05/2024     11/05/2024    K 4.5 11/05/2024     11/05/2024    CO2 27  "11/05/2024    ANIONGAP 14 11/05/2024    BUN 15 11/05/2024    CREATININE 0.77 11/05/2024    CALCIUM 9.7 11/05/2024    ALBUMIN 4.5 11/05/2024     Lab Results   Component Value Date    CALCIUM 9.7 11/05/2024    PROT 7.1 11/05/2024    ALBUMIN 4.5 11/05/2024    AST 28 11/05/2024    ALT 18 11/05/2024    ALKPHOS 108 11/05/2024    BILITOT 0.8 11/05/2024     Lab Results   Component Value Date    WBC 4.8 10/07/2024    HGB 13.3 (L) 10/07/2024    HCT 40.3 (L) 10/07/2024    MCV 83 10/07/2024     10/07/2024     INR   Date Value Ref Range Status   10/07/2024 1.1 0.9 - 1.1 Final   No results found for: \"TSH\"    Relevant ROS, Problem list, Past Medical/ Surgical/ Family/ Social history- reviewed and pertinent details noted in history.     Objective     Visit Vitals  /69 (BP Location: Left arm, Patient Position: Sitting, BP Cuff Size: Adult)   Pulse 74   Resp 18   Wt 76.2 kg (168 lb)   BMI 24.81 kg/m²   Smoking Status Never   BSA 1.93 m²       Neuro Scores/ Scales:   Modified Harry (mRS) Modified Tunica Score: Slight disability.  Able to look after own affairs without assistance, but unable to carry out all previous activities.   Barthel Index  Feeding: independent  Bathing: independent (or in shower)  Grooming: independent face/hair/teeth.shaving (implements provided)  Dressing : independent (including buttons, zips, laces,etc.)  Bowels: independent (including buttons, zips, laces, etc.)  Bladder: continent  Toilet Use : independent (on and off, dressing, wiping)  Transfers (Bed to chair and back) : independent  Mobility (On level surfaces): independent (but may use any aid for example, stick) > 50 yards  Stairs: independent  Total (0-100): 100   NIHSS: NIH Stroke Scale 1A. Level of Consciousness: Alert, Keenly Responsive, 1B. Ask Month and Age: Both Questions Right, 1C. Blink Eyes & Squeeze Hands: Performs Both Tasks, 2. Best Gaze: Normal, 3. Visual: No Visual Loss, 4. Facial Palsy: Partial Paralysis, 5A. Motor - Left " "Arm: Drift, 5B. Motor - Right Arm: No Drift, 6A. Motor - Left Leg: Drift, 6B. Motor - Right Leg: No Drift, 7. Limb Ataxia: Absent, 8. Sensory Loss: Mild-to-Moderate Sensory Loss, 9. Best Language: No Aphasia, 10. Dysarthria: Mild-to-Moderate Dysarthria, 11. Extinction and Inattention: No Abnormality, NIH Stroke Scale: 6   Ambulates with cane, limited armswing LUE and catches toe a bit circumducting but goes slowly with cane and is safe.   MMSE: MMSE Total Score:: 21  Difficulties with clock drawing but able to complete with additional time.    Randomly says L or both with DSS such that 1/2 is wrong.      Assessment/Plan   1. Spastic hemiplegia of left nondominant side as late effect of cerebral infarction (Multi)    2. Primary hypertension    3. Dyslipidemia    4. Type 2 diabetes mellitus with other circulatory complication, with long-term current use of insulin    5. Cognitive deficit as late effect of cerebrovascular accident (CVA)    6. Presence of internal carotid stent      PLAN   Aspirin with brillinta until 3/2025 then discontinue the brillinta and continue aspirin 81mg daily alone thereafter.   Goals for STROKE PREVENTION- recommendations to reduce the risk of vascular events and promote brain health include monitoring and management of vascular risk factors and lifestyle choices to goal values.     Blood pressure- Normal BP is <120/80 mmHg.  Blood Pressure : Goal is less than 130/80 mmHg  Cholesterol- Ideal lipid profile is an LDL-cholesterol <70 mg/dl, total cholesterol <200 mg/dl, fasting triglycerides < 150 mg/dl and HDL-cholesterol >55 mg/dl.   Blood sugar- Blood Sugar : Goal is fasting sugar  mg/dl, after eating less than 180 mg/dl; HbA1c less than 7%  Healthy physical activity- Goal is a moderate level of exercise at least 30 minutes/day, most days of the week.   Healthy weight- Goal is an ideal weight with a waistline <40\" for men or <35\" for women, and BMI of 18.5-25.   Healthy diet- is rich " in vegetables, fruits, whole grains, legumes and fish, low in salt, and avoids red meats and processed/ refined foods.   Healthy sleep- is restorative, ~7 hours/night, with identification and treatment of obstructive/ central sleep apnea that increases the risk of stroke and heart disease.   Smoking- Goal is to stop smoking any tobacco product and avoid second-hand smoke. For help to quit all forms of tobacco, call 2-324-QUIT-NOW (1-408.259.7770) to speak with a specialist at the [facility].    Alcohol- Goal is moderation; no more than 2 servings for men and 1 serving for non-pregnant women.   Drug use- Goal is avoidance of illicit drugs that can cause blood pressure spikes and damage to blood vessels.   Stroke Warning Signs- know the symptoms of stroke and the importance of calling 911/EMS to access the quickest treatment.     Brain Health- You can keep your brain healthy by following with your primary doctor to keep your medical conditions well controlled.    Bring your medicines to doctor visits to make sure they are right for you and you are not on too many or unnecessary medicines.  Use a weekly pill box to keep organized.    Eat a heart-healthy diet- like the Mediterranean diet or a plant-based diet- unless you are prescribed a specific diet for your medical conditions.    Do not drink alcohol excessively or smoke.    Keep yourself mentally active by socializing, reading, doing puzzles or hobbies. Have a schedule of activities during the day to stay physically and socially active.   Exercise regularly and get at least 7 hours of quality sleep at night to be your most alert and active during the day.     If you have cognitive difficulties, things you can do to minimize the impact in your life and compensate include:   Limit the use of any medications that can impair brain attention and memory, including pain medications and sedatives.   Get the most information from the world around you by having the best  vision and hearing you can with glasses or hearing aids if needed.    Limit distractions when you are trying to concentrate and focus on one task at a time.    Save the complicated tasks for the time of day that you are most fresh and alert and take breaks as needed.   Have a routine in your day to bolster your memory and make sure you have a system to consistently put important items like keys/ phone in the same place.   Use calendars, notes, alarms and sticky notes to keep you on track.   Have a schedule of activities during the day to stay physically and socially active.  Keep yourself mentally active by socializing, reading, doing puzzles or hobbies. Try to challenge yourself by learning something new like a craft, dance, computer program.     .   Will complete forms from work when available- disabling deficits poststroke now at nearly 1yr are permanent.  Not cleared to resume driving.   No orders of the defined types were placed in this encounter.

## 2025-01-06 NOTE — PATIENT INSTRUCTIONS
"Assessment/Plan   1. Spastic hemiplegia of left nondominant side as late effect of cerebral infarction (Multi)    2. Primary hypertension    3. Dyslipidemia    4. Type 2 diabetes mellitus with other circulatory complication, with long-term current use of insulin    5. Cognitive deficit as late effect of cerebrovascular accident (CVA)    6. Presence of internal carotid stent      PLAN   Aspirin with brillinta until 3/2025 then discontinue the brillinta and continue aspirin 81mg daily alone thereafter.   Goals for STROKE PREVENTION- recommendations to reduce the risk of vascular events and promote brain health include monitoring and management of vascular risk factors and lifestyle choices to goal values.     Blood pressure- Normal BP is <120/80 mmHg.  Blood Pressure : Goal is less than 130/80 mmHg  Cholesterol- Ideal lipid profile is an LDL-cholesterol <70 mg/dl, total cholesterol <200 mg/dl, fasting triglycerides < 150 mg/dl and HDL-cholesterol >55 mg/dl.   Blood sugar- Blood Sugar : Goal is fasting sugar  mg/dl, after eating less than 180 mg/dl; HbA1c less than 7%  Healthy physical activity- Goal is a moderate level of exercise at least 30 minutes/day, most days of the week.   Healthy weight- Goal is an ideal weight with a waistline <40\" for men or <35\" for women, and BMI of 18.5-25.   Healthy diet- is rich in vegetables, fruits, whole grains, legumes and fish, low in salt, and avoids red meats and processed/ refined foods.   Healthy sleep- is restorative, ~7 hours/night, with identification and treatment of obstructive/ central sleep apnea that increases the risk of stroke and heart disease.   Smoking- Goal is to stop smoking any tobacco product and avoid second-hand smoke. For help to quit all forms of tobacco, call 7-800-QUIT-NOW (1-647.192.4914) to speak with a specialist at the [place] [facility].    Alcohol- Goal is moderation; no more than 2 servings for men and 1 serving for non-pregnant women.   Drug " use- Goal is avoidance of illicit drugs that can cause blood pressure spikes and damage to blood vessels.   Stroke Warning Signs- know the symptoms of stroke and the importance of calling 911/EMS to access the quickest treatment.     Brain Health- You can keep your brain healthy by following with your primary doctor to keep your medical conditions well controlled.    Bring your medicines to doctor visits to make sure they are right for you and you are not on too many or unnecessary medicines.  Use a weekly pill box to keep organized.    Eat a heart-healthy diet- like the Mediterranean diet or a plant-based diet- unless you are prescribed a specific diet for your medical conditions.    Do not drink alcohol excessively or smoke.    Keep yourself mentally active by socializing, reading, doing puzzles or hobbies. Have a schedule of activities during the day to stay physically and socially active.   Exercise regularly and get at least 7 hours of quality sleep at night to be your most alert and active during the day.     If you have cognitive difficulties, things you can do to minimize the impact in your life and compensate include:   Limit the use of any medications that can impair brain attention and memory, including pain medications and sedatives.   Get the most information from the world around you by having the best vision and hearing you can with glasses or hearing aids if needed.    Limit distractions when you are trying to concentrate and focus on one task at a time.    Save the complicated tasks for the time of day that you are most fresh and alert and take breaks as needed.   Have a routine in your day to bolster your memory and make sure you have a system to consistently put important items like keys/ phone in the same place.   Use calendars, notes, alarms and sticky notes to keep you on track.   Have a schedule of activities during the day to stay physically and socially active.  Keep yourself mentally active  by socializing, reading, doing puzzles or hobbies. Try to challenge yourself by learning something new like a craft, dance, computer program.

## 2025-01-07 ENCOUNTER — EVALUATION (OUTPATIENT)
Dept: PHYSICAL THERAPY | Facility: CLINIC | Age: 65
End: 2025-01-07
Payer: MEDICARE

## 2025-01-07 DIAGNOSIS — M75.22 BICEPS TENDONITIS, LEFT: ICD-10-CM

## 2025-01-07 PROCEDURE — 97161 PT EVAL LOW COMPLEX 20 MIN: CPT | Mod: GP | Performed by: PHYSICAL THERAPIST

## 2025-01-07 PROCEDURE — 97110 THERAPEUTIC EXERCISES: CPT | Mod: GP | Performed by: PHYSICAL THERAPIST

## 2025-01-07 ASSESSMENT — ENCOUNTER SYMPTOMS
DEPRESSION: 1
OCCASIONAL FEELINGS OF UNSTEADINESS: 1
LOSS OF SENSATION IN FEET: 0

## 2025-01-07 NOTE — PROGRESS NOTES
"Physical Therapy    Physical Therapy Evaluation and Treatment      Patient Name: Elijah Chase \"Gene\"  MRN: [id number]  Today's Date: 1/7/2025  Visit: 1  Insurance: Reviewed  Physician: Priyanka Garcia   Problem List Items Addressed This Visit    None  Visit Diagnoses         Codes    Biceps tendonitis, left     M75.22    Relevant Orders    Follow Up In Physical Therapy            Time Entry:   Time Calculation  Start Time: 1030  Stop Time: 1105  Time Calculation (min): 35 min  PT Evaluation Time Entry  PT Evaluation (Low) Time Entry: 25  PT Therapeutic Procedures Time Entry  Therapeutic Exercise Time Entry: 10       Assessment: Patient seen in PT for Initial Evaluation for shoulder pain.   Patient presents with postural deviation  decreased shoulder ROM , decreased shoulder strength, ant shoulder tenderness, and negative drop arm.  Functionally, patient  unable to lift heavy objects or do overhead work left.  Patient rates quickdash at 29.5%.    PT Assessment  Rehab Prognosis: Good  Evaluation/Treatment Tolerance: Patient tolerated treatment well     Plan:  Continue with POC  Pulleys, wand assisted ROM, theraband strengthening, PRE, posture, UBE    OP PT Plan  PT Plan: Skilled PT  PT Frequency: 1 time per week  Duration: 6  Onset Date: 11/01/24  Certification Period Start Date: 01/07/25  Certification Period End Date: 04/07/25  Rehab Potential: Good  Plan of Care Agreement: Patient    Current Problem:   1. Biceps tendonitis, left  Referral to Physical Therapy    Follow Up In Physical Therapy          Subjective    General: Patient with a history of left shoulder for one month.  Onset was insidious.  Patient treated with pain meds.  Patient complains of pain in the region of the left ant shoulder, sharp pain, 5-6/10 at worst last 7 days.  Patient's pain is worse with shoulder abd, lift, ethan lifting arm up .  Functionally, patient is unable to reach high above head .  History of stroke with left sided " weakness.  Not working.  X rays show mod ac oa, and min/mod gh oa       Precautions: HTN, CVA, fall risk     Prior Level of Function: able to do overhead work with left arm       Objective     Postural deviation: flexed at trunk  left Shoulder ROM to 110 flexion, 90 abduction, 70 er, and HBB to LS junction  left Shoulder strength 3-/5  flex, 2+/5 abduction, 3+/5  ER, and 5/5  IR  Special tests: negative drop arm test  Patient tender to palpation at the ant shoulder- biceps and greater tubercle    Outcome Measures:  Other Measures  Disability of Arm Shoulder Hand (DASH): 24     Treatments:  Patient instructed in HEP including: Codman's 20X each, wand assisted scaption and extension 10 each, and resisted shoulder ext, add and ir with red theraband 15X      EDUCATION: HEP       Goals:  Active       PT Problem       PT Goal 1       Start:  01/07/25    Expected End:  04/07/25       Shoulder pain 3/10 or less         PT Goal 2       Start:  01/07/25    Expected End:  04/07/25       Improve quickdash by 15%         PT Goal 3       Start:  01/07/25    Expected End:  04/07/25       Shoulder ROM elevation 140, HBB to TL junction         PT Goal 4       Start:  01/07/25    Expected End:  04/07/25       Shoulder strength 4-5/5

## 2025-01-16 ENCOUNTER — APPOINTMENT (OUTPATIENT)
Dept: INTEGRATIVE MEDICINE | Facility: CLINIC | Age: 65
End: 2025-01-16
Payer: COMMERCIAL

## 2025-01-22 ENCOUNTER — APPOINTMENT (OUTPATIENT)
Dept: PHYSICAL THERAPY | Facility: CLINIC | Age: 65
End: 2025-01-22
Payer: MEDICARE

## 2025-01-23 ENCOUNTER — APPOINTMENT (OUTPATIENT)
Dept: INTEGRATIVE MEDICINE | Facility: CLINIC | Age: 65
End: 2025-01-23
Payer: COMMERCIAL

## 2025-01-23 DIAGNOSIS — M54.16 LEFT LUMBAR RADICULOPATHY: ICD-10-CM

## 2025-01-23 DIAGNOSIS — M25.512 LEFT ANTERIOR SHOULDER PAIN: ICD-10-CM

## 2025-01-23 DIAGNOSIS — M54.6 DORSALGIA OF THORACIC REGION: ICD-10-CM

## 2025-01-23 DIAGNOSIS — M99.03 SEGMENTAL AND SOMATIC DYSFUNCTION OF LUMBAR REGION: Primary | ICD-10-CM

## 2025-01-23 DIAGNOSIS — M99.04 SEGMENTAL AND SOMATIC DYSFUNCTION OF SACRAL REGION: ICD-10-CM

## 2025-01-23 DIAGNOSIS — M99.02 SEGMENTAL AND SOMATIC DYSFUNCTION OF THORACIC REGION: ICD-10-CM

## 2025-01-23 DIAGNOSIS — M99.05 SEGMENTAL AND SOMATIC DYSFUNCTION OF PELVIC REGION: ICD-10-CM

## 2025-01-23 PROCEDURE — 98941 CHIROPRACT MANJ 3-4 REGIONS: CPT | Performed by: CHIROPRACTOR

## 2025-01-23 NOTE — PROGRESS NOTES
"Subjective   Patient ID: Elijah Chase \"Ricardo" is a 64 y.o. male who presents January 23, 2025 for chiropractic care.    (2) Medicare    Today, the patient rates their degree of pain as a 5 out of 10 on the numeric pain rating scale.     Markel returns for continued chiropractic care. He states that he responded well to his initial treatment. However, he has not noticed any significant changes. He reports that he would like me to focus on his shoulder today. The patient denies any changes in health since his last encounter and will follow up as scheduled.    ___________________________________________  Initial exam:   Elijah \"Myriam Chase presents for an initial evaluation and treatment of left sided shoulder pain and low back pain. He states that all his symptoms started in March 2024 following a stroke.. He states that his stroke effected the left side of his body and he has been having complaints along that side ever since. He states that he has been going through physical therapy and many other supportive treatments following his stroke episode. He states that he has been experiencing decreased range motion with some numbness and tingling into the arm. He states that his low back pain also comes and goes. He states that he experiences predominately localized discomfort in the low back, with intermittent numbness and tingling into the left lower extremity. He remarks that he has been experiencing constipation issues since his stroke as well. He also states that he utilizes a cane when ambulating but has become more stable since his stroke.    FINDINGS:  Mild multilevel spondylosis throughout the visualized lumbar spine.  No fracture or subluxation. Soft tissues intact.      IMPRESSION:  Mild lumbar spondylosis.      Objective   Physical Exam  Neurological:      General: No focal deficit present.      Mental Status: He is alert and oriented to person, place, and time.      Cranial Nerves: No dysarthria or facial " asymmetry.      Sensory: Sensation is intact.      Motor: Motor function is intact.      Coordination: Coordination is intact.      Gait: Gait is intact.         Palpation of the following regions revealed:  Thoracic: Thoracic paraspinals left, hypertonicity and tenderness.  Middle trapezius left, hypertonicity and tenderness.  Rhomboids left, hypertonicity and tenderness.  Lumbar: Lumbar paraspinals left, hypertonicity and tenderness.  Quadratus lumborum left, hypertonicity and tenderness.  Thoracolumbar fascia left, hypertonicity and tenderness.  Upper Extremity: Deltoid left, hypertonicity and tenderness.  Levator Scapula left, hypertonicity and tenderness.  Teres major/minor left, hypertonicity and tenderness.  Infraspinatus left, hypertonicity and tenderness.  Subscapularis left, hypertonicity and tenderness.  Supraspinatus left, hypertonicity and tenderness.    Segmental Joint(s): Segmental joint dysfunction was assessed with motion palpation and is identified in the following areas:  Lumbopelvic / Sacral SIJ : L4, L5/S1, and R SIJ    Assessment/Plan   Today's Treatment Included: Spinal manipulation to the following regions of segmental dysfunction identified on examination, using age-appropriate and injury specific force. Segmental Joint(s) Cervical : C6 C7 Segmental Joint(s) Thoracic : T2., T5, and T10 Segmental Joint(s) Lumbopelvic/Sacral SIJ : L4, L5/S1, R SIJ, and L SIJ  Chiropractic manipulation treatment techniques utilized: Pelvic blocking technique, Activator/Tool assisted technique, and Direct Non-force/Joint Mobilization technique  Soft tissue mobilization techniques utilized during treatment: Pin and Stretch and Cupping  Integrative Dry Needling (IDN) - Needles in/out:  10.    Soft-tissue mobilization was performed in the following areas:  Trapezius mm. Upper  and Middle  left, Levator Scap. left, Rhomboids left, Supraspinatus left, Infraspinatus left, Teres mm. left, Subscapularis left, Latissimus  Dorsi left, and Deltoid mm. left    Recommended follow-up in 1 week(s).     The patient tolerated today's treatment with little or no additional discomfort and was instructed to contact the office for questions or concerns.

## 2025-01-29 ENCOUNTER — ALLIED HEALTH (OUTPATIENT)
Dept: INTEGRATIVE MEDICINE | Facility: CLINIC | Age: 65
End: 2025-01-29
Payer: MEDICARE

## 2025-01-29 DIAGNOSIS — M99.05 SEGMENTAL AND SOMATIC DYSFUNCTION OF PELVIC REGION: ICD-10-CM

## 2025-01-29 DIAGNOSIS — M99.03 SEGMENTAL AND SOMATIC DYSFUNCTION OF LUMBAR REGION: Primary | ICD-10-CM

## 2025-01-29 DIAGNOSIS — M99.02 SEGMENTAL AND SOMATIC DYSFUNCTION OF THORACIC REGION: ICD-10-CM

## 2025-01-29 DIAGNOSIS — M54.6 DORSALGIA OF THORACIC REGION: ICD-10-CM

## 2025-01-29 DIAGNOSIS — M25.512 LEFT ANTERIOR SHOULDER PAIN: ICD-10-CM

## 2025-01-29 DIAGNOSIS — M99.04 SEGMENTAL AND SOMATIC DYSFUNCTION OF SACRAL REGION: ICD-10-CM

## 2025-01-29 DIAGNOSIS — M54.16 LEFT LUMBAR RADICULOPATHY: ICD-10-CM

## 2025-01-29 PROCEDURE — 98941 CHIROPRACT MANJ 3-4 REGIONS: CPT | Performed by: CHIROPRACTOR

## 2025-01-29 NOTE — PROGRESS NOTES
"Subjective   Patient ID: Elijah Chase \"Ricardo" is a 64 y.o. male who presents January 29, 2025 for chiropractic care.    (2) Medicare    Today, the patient rates their degree of pain as a {PAIN SCALE NUMBERS:04556} out of 10 on the numeric pain rating scale.     Initial exam:   Elijah \"Markel\"Salvatore presents for an initial evaluation and treatment of left sided shoulder pain and low back pain. He states that all his symptoms started in March 2024 following a stroke.. He states that his stroke effected the left side of his body and he has been having complaints along that side ever since. He states that he has been going through physical therapy and many other supportive treatments following his stroke episode. He states that he has been experiencing decreased range motion with some numbness and tingling into the arm. He states that his low back pain also comes and goes. He states that he experiences predominately localized discomfort in the low back, with intermittent numbness and tingling into the left lower extremity. He remarks that he has been experiencing constipation issues since his stroke as well. He also states that he utilizes a cane when ambulating but has become more stable since his stroke.    FINDINGS:  Mild multilevel spondylosis throughout the visualized lumbar spine.  No fracture or subluxation. Soft tissues intact.      IMPRESSION:  Mild lumbar spondylosis.         Objective   Physical Exam  Neurological:      General: No focal deficit present.      Mental Status: He is alert and oriented to person, place, and time.      Cranial Nerves: No dysarthria or facial asymmetry.      Sensory: Sensation is intact.      Motor: Motor function is intact.      Coordination: Coordination is intact.      Gait: Gait is intact.         Palpation of the following regions revealed:  {Palpation Regions (Optional):88771}    {Segmental Joint(s) (Optional):55450::\"Segmental joint dysfunction was assessed with motion " "palpation and is identified in the following areas:\"}    Assessment/Plan   {Today's Treatment Included (Optional):84207}    Soft-tissue mobilization was performed in the following areas:  {Soft tissue mobilization treatment region (Optional):63704}    Recommended follow-up in {Numbers; 1-10:59290} {days/weeks/months:6122}.     The patient tolerated today's treatment with little or no additional discomfort and was instructed to contact the office for questions or concerns.   " Latissimus Dorsi left, and Deltoid mm. left    Recommended follow-up in 1 week(s).     The patient tolerated today's treatment with little or no additional discomfort and was instructed to contact the office for questions or concerns.

## 2025-01-30 ENCOUNTER — APPOINTMENT (OUTPATIENT)
Dept: INTEGRATIVE MEDICINE | Facility: CLINIC | Age: 65
End: 2025-01-30
Payer: COMMERCIAL

## 2025-01-30 ENCOUNTER — TREATMENT (OUTPATIENT)
Dept: PHYSICAL THERAPY | Facility: CLINIC | Age: 65
End: 2025-01-30
Payer: MEDICARE

## 2025-01-30 DIAGNOSIS — M75.22 BICEPS TENDONITIS, LEFT: ICD-10-CM

## 2025-01-30 PROCEDURE — 97110 THERAPEUTIC EXERCISES: CPT | Mod: GP | Performed by: PHYSICAL THERAPIST

## 2025-01-30 NOTE — PROGRESS NOTES
Physical Therapy    Physical Therapy Treatment    Patient Name: Elijah Chase  MRN: [id number]  Today's Date: 1/30/2025  Visit: 2/6  Insurance: Medicare  Authorization: 1/7/25-4/7/25  Time Entry:   Time Calculation  Start Time: 0315  Stop Time: 0345  Time Calculation (min): 30 min     PT Therapeutic Procedures Time Entry  Therapeutic Exercise Time Entry: 30      Assessment: Good understanding and compliance with HEP.       Plan:  Continue with POC      Current Problem  1. Biceps tendonitis, left  Follow Up In Physical Therapy          General      Shoulder pain 5/10 or less  Patient states he had acupuncture which helped with shoulder pain    Subjective    Precautions: HTN, CVA, fall risk        Pain       Objective     Independent with HEP    Treatments:  Pulleys 5 minutes  Ball rolls on table 20X   Shoulder retraction and extension with red theraband 20X each  Reviewed HEP  Supine wand assisted elevation, and elevation/er 10X each  Supine shoulder press, flies and triceps 2 X 10 with 2#  UBE 5 minutes  (30 minutes)    OP EDUCATION: reviewed HEP        Goals:  Active       PT Problem       PT Goal 1       Start:  01/07/25    Expected End:  04/07/25       Shoulder pain 3/10 or less         PT Goal 2       Start:  01/07/25    Expected End:  04/07/25       Improve quickdash by 15%         PT Goal 3       Start:  01/07/25    Expected End:  04/07/25       Shoulder ROM elevation 140, HBB to TL junction         PT Goal 4       Start:  01/07/25    Expected End:  04/07/25       Shoulder strength 4-5/5

## 2025-01-31 ENCOUNTER — APPOINTMENT (OUTPATIENT)
Dept: PHYSICAL MEDICINE AND REHAB | Facility: CLINIC | Age: 65
End: 2025-01-31
Payer: COMMERCIAL

## 2025-01-31 VITALS — RESPIRATION RATE: 18 BRPM | HEART RATE: 69 BPM | SYSTOLIC BLOOD PRESSURE: 117 MMHG | DIASTOLIC BLOOD PRESSURE: 74 MMHG

## 2025-01-31 DIAGNOSIS — I63.511 CEREBROVASCULAR ACCIDENT (CVA) DUE TO STENOSIS OF RIGHT MIDDLE CEREBRAL ARTERY (MULTI): Primary | ICD-10-CM

## 2025-01-31 DIAGNOSIS — I69.354 SPASTIC HEMIPLEGIA OF LEFT NONDOMINANT SIDE AS LATE EFFECT OF CEREBRAL INFARCTION (MULTI): ICD-10-CM

## 2025-01-31 PROCEDURE — 3078F DIAST BP <80 MM HG: CPT | Performed by: PHYSICAL MEDICINE & REHABILITATION

## 2025-01-31 PROCEDURE — 3074F SYST BP LT 130 MM HG: CPT | Performed by: PHYSICAL MEDICINE & REHABILITATION

## 2025-01-31 PROCEDURE — 1125F AMNT PAIN NOTED PAIN PRSNT: CPT | Performed by: PHYSICAL MEDICINE & REHABILITATION

## 2025-01-31 PROCEDURE — 1123F ACP DISCUSS/DSCN MKR DOCD: CPT | Performed by: PHYSICAL MEDICINE & REHABILITATION

## 2025-01-31 PROCEDURE — 4010F ACE/ARB THERAPY RXD/TAKEN: CPT | Performed by: PHYSICAL MEDICINE & REHABILITATION

## 2025-01-31 PROCEDURE — G2211 COMPLEX E/M VISIT ADD ON: HCPCS | Performed by: PHYSICAL MEDICINE & REHABILITATION

## 2025-01-31 PROCEDURE — 1159F MED LIST DOCD IN RCRD: CPT | Performed by: PHYSICAL MEDICINE & REHABILITATION

## 2025-01-31 PROCEDURE — 99214 OFFICE O/P EST MOD 30 MIN: CPT | Performed by: PHYSICAL MEDICINE & REHABILITATION

## 2025-01-31 ASSESSMENT — PAIN SCALES - GENERAL: PAINLEVEL_OUTOF10: 5

## 2025-01-31 NOTE — PROGRESS NOTES
HPI  Mr Elijah Chase, a 64 y.o. year old male with pmhx of HTN and DM here with his daughter and brother here for evaluation of stroke. Patient had a right sided stroke 3/8/24 at OSH in [place]. Patient went to inpatient rehabilitation at [facility] for 2 weeks and completed IPR on 3/31/24. They did not set patient up with PT or OT as they were not within the state. Has not been doing formal therapy since discharge from IPR. Deficits following stroke include left sided weakness, difficulties with swallowing, speech, cognition but symptoms have been improving. Reports dysuria, as well as difficulties with urinary urgency, incontinence, foul smelling urine. No urinary issues prior to CVA. Currently urinating with bedside urinal. Also having right sided flank pain. Also having difficulties with vision on left, possible neglect on the left side. Denies any spasticity.    Reports depressed mood, crying, difficulty adjusting. Also having difficulties with sleep. Difficulties staying asleep, taking melatonin sporadically from daughter.     They do not have imaging or records from hospitalization or IPR with him      He was last seen in April 2024. At that time we discussed:  -New PT and OT referrals placed, emphasis on neurologic aspect of therapy  -already has ST ordered through PCP for speech and cog, dysarthria, word finding difficulties. Per family is on normal diet.  -Psych referral placed for adjustment  -UA ordered due to dysuria, discolored urine, frequency. Potential UTI although neurogenic bladder may be contributing  -Handicap placard ordered  -Agree with establishing with neurology, PCP placed neuro referral. Discussed to get records from inpatient hospitalization re location of stroke etc  -BMP ordered for baseline lab and kidney function  -Follow up in 6-8 weeks     Completed outpatient therapy  and does walking and leg exercises but not doing arm and hand exercises.      Has a hard time with sleep, wakes  up and hard to go back to sleep. Melatonin wo relief.  Difficulty w concentration.    Back pain comes and goes. Started one month ago. Middle low back pain,. Comes and goes.  Left leg pain.  Takies       Left shoulder pain anterior/biceps comes and goes. Since the stroke      He was last seen in nov 2024. At that time we discussed:  Stroke Rehab  -completed outpatient speech, PT and OT 2024  --Psych referral placed for adjustment however per daughter would be over 6 month wait. Placed an outside referral for stroke rehab psychology at [facility].  -Handicap placard ordered prior for daughter who is caretaker and poa  -continue fu w neuro  - difficulty w sleep, start trazodone, melatonin wo relief  - once sleep is better might benefit from ssri but patient and family hesitant what to try therapy first     Left lumbar radiculitis, sij dysfunction  - xr l spine  - PT for core rom hep  - referral for chiropractic eval    L biceps tendonitis  - xr left shoulder  - pt   - volataren gel  - tylenol 1000 mg tid prn, discussed no otc nsaids since in [place]      Scheduled for rehab psychology at [facility]. Overall feeling bnetter, mood is overall better, still some down days but good overall.  Doing therapy for shoulder, helping. He is also going to chiropractor for shoulder and back.  Shoulder and back are better.  Voltaren gel doesn't need. Doesn't need tylenol.  Trazadone wo relief,.     Imaging  Reviewed w patient and personally 01/31/25  Xr L shoulder 11/2024  FINDINGS:  Mild-to-moderate productive degenerative changes of the glenohumeral  joint. Moderate productive degenerative changes of the  acromioclavicular joint. No fracture or dislocation. No osseous  lesion. Soft tissues intact.      IMPRESSION:  Mild-to-moderate glenohumeral and moderate AC osteoarthrosis left  shoulder.      Xr L spine 11/2024  FINDINGS:  Mild multilevel spondylosis throughout the visualized lumbar spine.  No fracture or subluxation. Soft  tissues intact.      IMPRESSION:  Mild lumbar spondylosis.    IMAGING: Had done at OSH, no records in system, unclear location of stroke, likely right sided with left sided deficits based on presentation     FUNCTIONAL HISTORY: Using FWW to ambulate, was independent prior to CVA     SH:  Patient ambulates with a FWW. Patient was completed independent prior to CVA. Does need help with dressing. Has rails for bathroom. He is currently living with his daughter and daughters , 1 JANNA, bed and bath on first floor. Living in [place]  Occupation: Was driving a forklift prior to CVA  Tobacco/Alcohol/Drugs : Denies    PE:  There were no vitals taken for this visit.    GEN - Alert, well-developed, well-nourished, no acute distress  PSYCH - Cooperative, appropriate mood and affect  HEENT - NC/AT  RESP - Non-labored respirations, equal expansion  CV - warm and well-perfused, No cyanosis or edema in extremities. DP pulses 2+ bilaterally  ABD- soft, ND  SKIN - No rash.    Mild dysarthria, delayed recall/processing     NEURO - UE strength 4+/5 on left, 5/5 on right-  including shoulder abduction, biceps, triceps, wrist extensors, finger flexors, interossei, and   LE strength 4+/5 on left, 5/5 on right -  including hip flexors, knee flexors, knee extensors, ankle dorsiflexors, ankle plantar flexors, and EHL  Sensation - intact to light touch in bilateral lower extremities.  GAIT - Uses cane, spastic gait pattern  Dysdiadochokinesia with difficulty on right, F-N difficulty L worse than right  CN testing: Left sided facial droop, otherwise in tact in cranial nerves (other than VII)  Flat affect    Ttp left biceps  + speeds  Flexion and abduction passive 120 degrees active 160  - impingement    Ttp l spine left more than R  + sit slump test on L    Impression/PLAN:  Elijah Chase is a 64 year old male with pmhx of HTN, DM here for evaluation of impairments after stroke,  likely right sided with left sided deficits from  OSH.  Deficits include left sided weakness, speech, cognitive, swallowing difficulties. These have all improved w therapy but continues w spastic gait improving w therapy, now worsening sleep, fatigue and depressed mood.  Also difficulties with depressed mood. Now also presents w left lumbar radiculitis and left shoulder biceps tendonitis.          Stroke Rehab  -completed outpatient speech, PT and OT 2024  --Psych referral placed for adjustment however per daughter would be over 6 month wait. Placed an outside referral for stroke rehab psychology at [facility] now pending,  -Handicap placard ordered prior for daughter who is caretaker and poa  -continue fu w neuro  - difficulty w sleep, can increase trazodone to 2 tablets at night, melatonin wo relief  - once sleep is better might benefit from ssri but patient and family hesitant what to try therapy first  - OT for drivers eval     Left lumbar radiculitis, sij dysfunction  - xr l spine w mild spondylosis  - PT for core rom hep; ciontinue  - referral for chiropractic eval    L biceps tendonitis  - xr left shoulder w mild and moderate arthritis, reviewed w patient  - pt continue  - volataren gel  - tylenol 1000 mg tid prn, discussed no otc nsaids since in [place] Randle MD     Division of PM&R

## 2025-02-04 ENCOUNTER — TREATMENT (OUTPATIENT)
Dept: PHYSICAL THERAPY | Facility: CLINIC | Age: 65
End: 2025-02-04
Payer: MEDICARE

## 2025-02-04 DIAGNOSIS — M75.22 BICEPS TENDONITIS, LEFT: ICD-10-CM

## 2025-02-04 PROCEDURE — 97110 THERAPEUTIC EXERCISES: CPT | Mod: GP,CQ

## 2025-02-04 ASSESSMENT — PAIN SCALES - GENERAL: PAINLEVEL_OUTOF10: 0 - NO PAIN

## 2025-02-04 ASSESSMENT — PAIN - FUNCTIONAL ASSESSMENT: PAIN_FUNCTIONAL_ASSESSMENT: 0-10

## 2025-02-04 NOTE — PROGRESS NOTES
Physical Therapy    Physical Therapy Treatment    Patient Name: Elijah Chase  MRN: [id number]  Today's Date: 2/4/2025  Visit: 3/6  Insurance: Medicare  Authorization: 1/7/25-4/7/25  Time Entry:   Time Calculation  Start Time: 0100  Stop Time: 0145  Time Calculation (min): 45 min     PT Therapeutic Procedures Time Entry  Therapeutic Exercise Time Entry: 45      Assessment:  PT Assessment  Evaluation/Treatment Tolerance: Patient tolerated treatment well  Tolerated session well. Without report of increased pain. Abduction strengthening added and able to raise to 90 degrees with 1# weight, cueing given to slow eccentric portion. Grasp of new exercises is good with intermittent cues on form correction.     Plan:  Continue with POC  Pulleys, wand assisted ROM, theraband strengthening, PRE, posture, UBE    OP PT Plan  PT Plan: Skilled PT  PT Frequency: 1 time per week  Duration: 6  Onset Date: 11/01/24  Certification Period Start Date: 01/07/25  Certification Period End Date: 04/07/25  Rehab Potential: Good  Plan of Care Agreement: Patient    Current Problem  1. Biceps tendonitis, left  Follow Up In Physical Therapy      General      Shoulder pain 5/10 or less  Patient states he had acupuncture which helped with shoulder pain    Subjective   No pain today in the shoulder  Sunday I had 5/10 pain    HEP completed every other day    Precautions: HTN, CVA, fall risk        Pain  Pain Assessment  Pain Assessment: 0-10  0-10 (Numeric) Pain Score: 0 - No pain    Objective   Independent with HEP    Treatments:  Pulleys x 5 minutes  Ball rolls on table 20X   Standing cane flexion/extension x 20  Shoulder retraction and extension with red theraband 20X each  RTB IR x 20  RTB adduction x 20  RTB biceps curls x 20  RTB triceps extensions x 20  Supine wand assisted elevation, and elevation/er 20X each  Supine shoulder press, flies 2 X 10 with 3#  Standing shoulder abduction 1# x 20  UBE x 5 minutes    (45 minutes)    OP EDUCATION:  reviewed HEP      Goals:  Active       PT Problem       PT Goal 1       Start:  01/07/25    Expected End:  04/07/25       Shoulder pain 3/10 or less         PT Goal 2       Start:  01/07/25    Expected End:  04/07/25       Improve quickdash by 15%         PT Goal 3       Start:  01/07/25    Expected End:  04/07/25       Shoulder ROM elevation 140, HBB to TL junction         PT Goal 4       Start:  01/07/25    Expected End:  04/07/25       Shoulder strength 4-5/5

## 2025-02-11 ENCOUNTER — TREATMENT (OUTPATIENT)
Dept: PHYSICAL THERAPY | Facility: CLINIC | Age: 65
End: 2025-02-11
Payer: MEDICARE

## 2025-02-11 DIAGNOSIS — M75.22 BICEPS TENDONITIS, LEFT: ICD-10-CM

## 2025-02-11 PROCEDURE — 97110 THERAPEUTIC EXERCISES: CPT | Mod: GP | Performed by: PHYSICAL THERAPIST

## 2025-02-11 NOTE — PROGRESS NOTES
Physical Therapy    Physical Therapy Treatment    Patient Name: Elijah Chase  MRN: [id number]  Today's Date: 2/11/2025  Visit: 3/6  Insurance: Medicare  Authorization: 1/7/25-4/7/25  Time Entry:   Time Calculation  Start Time: 0145  Stop Time: 0225  Time Calculation (min): 40 min     PT Therapeutic Procedures Time Entry  Therapeutic Exercise Time Entry: 40      Assessment: Patient with no complaint of left shoulder pain currently.  Patient able to progress exercises without pain or difficulty.        Plan:  Continue with POC  Reassess next visit   Pulleys, wand assisted ROM, theraband strengthening, PRE, posture, UBE  OP PT Plan  PT Plan: Skilled PT  PT Frequency: 1 time per week  Duration: 6  Onset Date: 11/01/24  Certification Period Start Date: 01/07/25  Certification Period End Date: 04/07/25  Rehab Potential: Good  Plan of Care Agreement: Patient    Current Problem  1. Biceps tendonitis, left  Follow Up In Physical Therapy      General     Left Shoulder pain 0/10 or less  Complains of recent onset of right shoulder pain    Subjective   Able to raise arm better   No pain last 2 days    Precautions: HTN, CVA, fall risk        Pain       Objective       Treatments:  Pulleys x 5 minutes  Ball rolls on table 20X   Ball walk up wall 10X  HBB with pulley 20X   Doorway stretch 20X each   Shoulder retraction and extension with red theraband 20X each  RTB IR x 20 - not today  RTB adduction x 20 - not today  Supine wand assisted elevation, and elevation/er 20X each - not today  Supine shoulder press, flies 1 X 10 with 4#  Standing shoulder abduction 1# x 20  Standing shoulder er 2# 15X   UBE x 5 minutes    (40 minutes)    OP EDUCATION: reviewed HEP      Goals:  Active       PT Problem       PT Goal 1       Start:  01/07/25    Expected End:  04/07/25       Shoulder pain 3/10 or less         PT Goal 2       Start:  01/07/25    Expected End:  04/07/25       Improve quickdash by 15%         PT Goal 3       Start:  01/07/25     Expected End:  04/07/25       Shoulder ROM elevation 140, HBB to TL junction         PT Goal 4       Start:  01/07/25    Expected End:  04/07/25       Shoulder strength 4-5/5

## 2025-02-19 ENCOUNTER — APPOINTMENT (OUTPATIENT)
Dept: INTEGRATIVE MEDICINE | Facility: CLINIC | Age: 65
End: 2025-02-19
Payer: MEDICARE

## 2025-02-20 ENCOUNTER — APPOINTMENT (OUTPATIENT)
Dept: PHYSICAL THERAPY | Facility: CLINIC | Age: 65
End: 2025-02-20
Payer: MEDICARE

## 2025-02-27 ENCOUNTER — TREATMENT (OUTPATIENT)
Dept: PHYSICAL THERAPY | Facility: CLINIC | Age: 65
End: 2025-02-27
Payer: MEDICARE

## 2025-02-27 DIAGNOSIS — M75.22 BICEPS TENDONITIS, LEFT: ICD-10-CM

## 2025-02-27 PROCEDURE — 97110 THERAPEUTIC EXERCISES: CPT | Mod: GP,CQ

## 2025-02-27 ASSESSMENT — PAIN SCALES - GENERAL: PAINLEVEL_OUTOF10: 0 - NO PAIN

## 2025-02-27 ASSESSMENT — PAIN - FUNCTIONAL ASSESSMENT: PAIN_FUNCTIONAL_ASSESSMENT: 0-10

## 2025-02-27 NOTE — PROGRESS NOTES
Physical Therapy    Physical Therapy Treatment    Patient Name: Elijah Chase  MRN: [id number]  Today's Date: 2/27/2025  Visit: 5/6  Insurance: Medicare  Authorization: 1/7/25-4/7/25  Time Entry:   Time Calculation  Start Time: 1015  Stop Time: 1100  Time Calculation (min): 45 min     PT Therapeutic Procedures Time Entry  Therapeutic Exercise Time Entry: 45    Assessment:   No complaint of left shoulder pain today. Left arm fatigue observed with supine press. Cues provided through session for slow controlled repetitions. Doing well at end of session.     Plan:  Continue with POC  Reassess next visit   Pulleys, wand assisted ROM, theraband strengthening, PRE, posture, UBE  OP PT Plan  PT Plan: Skilled PT  PT Frequency: 1 time per week  Duration: 6  Onset Date: 11/01/24  Certification Period Start Date: 01/07/25  Certification Period End Date: 04/07/25  Rehab Potential: Good  Plan of Care Agreement: Patient    Current Problem  1. Biceps tendonitis, left  Follow Up In Physical Therapy      General   Left Shoulder pain 0/10 or less  Complains of recent onset of right shoulder pain    Subjective   No pain today  Pain this past Tuesday, fairly intense at a 5/10  Hep completed daily  No other noted updates at the moment    Precautions: HTN, CVA, fall risk        Pain  Pain Assessment  Pain Assessment: 0-10  0-10 (Numeric) Pain Score: 0 - No pain    Objective     Treatments:  Pulleys x 5 minutes (not today)  RTB IR x 20 - not today  RTB adduction x 20 - not today  Supine wand assisted elevation, and elevation/er 20X each - not today    UBE 2 x 3 min forward/back  Ball rolls on table 20X  Ball walk up wall 10X  Doorway pec stretch x 20  Doorway biceps stretch 10 x 5 sec  Shoulder retraction and extension with red theraband 20X each  Standing shoulder abduction 2# x 20  Standing shoulder er 2# 15X   Bicep curl 3# x 20  Supine shoulder press, flies 1 X 10 with 4#    (40 minutes)    OP EDUCATION: reviewed HEP      Goals:  Active        PT Problem       PT Goal 1       Start:  01/07/25    Expected End:  04/07/25       Shoulder pain 3/10 or less         PT Goal 2       Start:  01/07/25    Expected End:  04/07/25       Improve quickdash by 15%         PT Goal 3       Start:  01/07/25    Expected End:  04/07/25       Shoulder ROM elevation 140, HBB to TL junction         PT Goal 4       Start:  01/07/25    Expected End:  04/07/25       Shoulder strength 4-5/5

## 2025-03-17 ENCOUNTER — APPOINTMENT (OUTPATIENT)
Dept: INTEGRATIVE MEDICINE | Facility: CLINIC | Age: 65
End: 2025-03-17
Payer: MEDICARE

## 2025-03-17 DIAGNOSIS — M99.02 SEGMENTAL AND SOMATIC DYSFUNCTION OF THORACIC REGION: ICD-10-CM

## 2025-03-17 DIAGNOSIS — M25.512 LEFT ANTERIOR SHOULDER PAIN: ICD-10-CM

## 2025-03-17 DIAGNOSIS — M99.03 SEGMENTAL AND SOMATIC DYSFUNCTION OF LUMBAR REGION: Primary | ICD-10-CM

## 2025-03-17 DIAGNOSIS — M54.6 DORSALGIA OF THORACIC REGION: ICD-10-CM

## 2025-03-17 PROCEDURE — 98940 CHIROPRACT MANJ 1-2 REGIONS: CPT | Performed by: CHIROPRACTOR

## 2025-03-17 NOTE — PROGRESS NOTES
"Subjective   Patient ID: Elijah Chase \"Ricardo" is a 64 y.o. male who presents March 17, 2025 for chiropractic care.    (4) Medicare    Today, the patient rates their degree of pain as a 5 out of 10 on the numeric pain rating scale.     Markel returns for continued chiropractic care. He states that during his workouts, he has been experiencing a locking sensation in the front of his shoulder when performing over head lifts. The patient denies any changes in health since his last encounter and will follow up as scheduled.    ___________________________________________  Initial exam:   Elijah \"Myriam Chase presents for an initial evaluation and treatment of left sided shoulder pain and low back pain. He states that all his symptoms started in March 2024 following a stroke.. He states that his stroke effected the left side of his body and he has been having complaints along that side ever since. He states that he has been going through physical therapy and many other supportive treatments following his stroke episode. He states that he has been experiencing decreased range motion with some numbness and tingling into the arm. He states that his low back pain also comes and goes. He states that he experiences predominately localized discomfort in the low back, with intermittent numbness and tingling into the left lower extremity. He remarks that he has been experiencing constipation issues since his stroke as well. He also states that he utilizes a cane when ambulating but has become more stable since his stroke.    FINDINGS:  Mild multilevel spondylosis throughout the visualized lumbar spine.  No fracture or subluxation. Soft tissues intact.      IMPRESSION:  Mild lumbar spondylosis.      Objective   Physical Exam  Neurological:      General: No focal deficit present.      Mental Status: He is alert and oriented to person, place, and time.      Cranial Nerves: No dysarthria or facial asymmetry.      Sensory: Sensation is " intact.      Motor: Motor function is intact.      Coordination: Coordination is intact.      Gait: Gait is intact.       Palpation of the following regions revealed:  Thoracic: Thoracic paraspinals left, hypertonicity and tenderness.  Middle trapezius left, hypertonicity and tenderness.  Rhomboids left, hypertonicity and tenderness.  Lumbar: Lumbar paraspinals left, hypertonicity and tenderness.  Quadratus lumborum left, hypertonicity and tenderness.  Thoracolumbar fascia left, hypertonicity and tenderness.  Upper Extremity: Deltoid left, hypertonicity and tenderness.  Levator Scapula left, hypertonicity and tenderness.  Teres major/minor left, hypertonicity and tenderness.  Infraspinatus left, hypertonicity and tenderness.  Subscapularis left, hypertonicity and tenderness.  Supraspinatus left, hypertonicity and tenderness.    Segmental Joint(s): Segmental joint dysfunction was assessed with motion palpation and is identified in the following areas:  Lumbopelvic / Sacral SIJ : L4, L5/S1, and R SIJ    Assessment/Plan   Today's Treatment Included: Spinal manipulation to the following regions of segmental dysfunction identified on examination, using age-appropriate and injury specific force. Segmental Joint(s) Cervical : C6 C7 Segmental Joint(s) Thoracic : T2.   Chiropractic manipulation treatment techniques utilized: Direct Non-force/Joint Mobilization technique  Soft tissue mobilization techniques utilized during treatment: Ischemic compression  Integrative Dry Needling (IDN) - Needles in/out:  18.    Soft-tissue mobilization was performed in the following areas:  Trapezius mm. Upper  and Middle  bilateral, Levator Scap. bilateral, Rhomboids bilateral, Supraspinatus bilateral, Infraspinatus bilateral, Teres mm. bilateral, Subscapularis bilateral, Latissimus Dorsi bilateral, and Deltoid mm. bilateral    Recommended follow-up in 1 month(s).     The patient tolerated today's treatment with little or no additional  discomfort and was instructed to contact the office for questions or concerns.

## 2025-04-02 ENCOUNTER — APPOINTMENT (OUTPATIENT)
Dept: INTEGRATIVE MEDICINE | Facility: CLINIC | Age: 65
End: 2025-04-02
Payer: MEDICARE

## 2025-04-02 DIAGNOSIS — M54.16 LEFT LUMBAR RADICULOPATHY: ICD-10-CM

## 2025-04-02 DIAGNOSIS — M99.04 SEGMENTAL AND SOMATIC DYSFUNCTION OF SACRAL REGION: ICD-10-CM

## 2025-04-02 DIAGNOSIS — M25.512 LEFT ANTERIOR SHOULDER PAIN: ICD-10-CM

## 2025-04-02 DIAGNOSIS — M99.03 SEGMENTAL AND SOMATIC DYSFUNCTION OF LUMBAR REGION: ICD-10-CM

## 2025-04-02 DIAGNOSIS — M99.02 SEGMENTAL AND SOMATIC DYSFUNCTION OF THORACIC REGION: Primary | ICD-10-CM

## 2025-04-02 DIAGNOSIS — M54.6 DORSALGIA OF THORACIC REGION: ICD-10-CM

## 2025-04-02 PROCEDURE — 98941 CHIROPRACT MANJ 3-4 REGIONS: CPT | Performed by: CHIROPRACTOR

## 2025-04-02 NOTE — PROGRESS NOTES
"Subjective   Patient ID: Elijah Chase \"Ricardo" is a 64 y.o. male who presents April 2, 2025 for chiropractic care.    (5) Medicare    Today, the patient rates their degree of pain as a 5 out of 10 on the numeric pain rating scale.     Markel returns for continued chiropractic care. He states that he continues to experience a locking sensation in the front of his shoulder when performing over head lifts. However, he reports that he did feel some improvement following his last visit. The patient denies any changes in health since his last encounter and will follow up as scheduled.    ___________________________________________  Initial exam:   Elijah \"Myriam Chase presents for an initial evaluation and treatment of left sided shoulder pain and low back pain. He states that all his symptoms started in March 2024 following a stroke.. He states that his stroke effected the left side of his body and he has been having complaints along that side ever since. He states that he has been going through physical therapy and many other supportive treatments following his stroke episode. He states that he has been experiencing decreased range motion with some numbness and tingling into the arm. He states that his low back pain also comes and goes. He states that he experiences predominately localized discomfort in the low back, with intermittent numbness and tingling into the left lower extremity. He remarks that he has been experiencing constipation issues since his stroke as well. He also states that he utilizes a cane when ambulating but has become more stable since his stroke.    FINDINGS:  Mild multilevel spondylosis throughout the visualized lumbar spine.  No fracture or subluxation. Soft tissues intact.      IMPRESSION:  Mild lumbar spondylosis.      Objective   Physical Exam  Neurological:      General: No focal deficit present.      Mental Status: He is alert and oriented to person, place, and time.      Cranial Nerves: No " dysarthria or facial asymmetry.      Sensory: Sensation is intact.      Motor: Motor function is intact.      Coordination: Coordination is intact.      Gait: Gait is intact.     Palpation of the following regions revealed:  Thoracic: Thoracic paraspinals left, hypertonicity and tenderness.  Middle trapezius left, hypertonicity and tenderness.  Rhomboids left, hypertonicity and tenderness.  Lumbar: Lumbar paraspinals left, hypertonicity and tenderness.  Quadratus lumborum left, hypertonicity and tenderness.  Thoracolumbar fascia left, hypertonicity and tenderness.  Upper Extremity: Deltoid left, hypertonicity and tenderness.  Levator Scapula left, hypertonicity and tenderness.  Teres major/minor left, hypertonicity and tenderness.  Infraspinatus left, hypertonicity and tenderness.  Subscapularis left, hypertonicity and tenderness.  Supraspinatus left, hypertonicity and tenderness.    Segmental Joint(s): Segmental joint dysfunction was assessed with motion palpation and is identified in the following areas:  Lumbopelvic / Sacral SIJ : L4, L5/S1, and R SIJ    Assessment/Plan   Today's Treatment Included: Spinal manipulation to the following regions of segmental dysfunction identified on examination, using age-appropriate and injury specific force. Segmental Joint(s) Cervical : C6 C7 Segmental Joint(s) Thoracic : T2.   Chiropractic manipulation treatment techniques utilized: Direct Non-force/Joint Mobilization technique  Soft tissue mobilization techniques utilized during treatment: Cupping  Integrative Dry Needling (IDN) - Needles in/out:  17.    Soft-tissue mobilization was performed in the following areas:  Trapezius mm. Upper  and Middle  bilateral, Levator Scap. bilateral, Rhomboids bilateral, Supraspinatus bilateral, Infraspinatus bilateral, Teres mm. bilateral, Subscapularis bilateral, Latissimus Dorsi bilateral, and Deltoid mm. bilateral    Recommended follow-up in 2 week(s).     The patient tolerated today's  treatment with little or no additional discomfort and was instructed to contact the office for questions or concerns.

## 2025-04-04 DIAGNOSIS — I63.511 CEREBROVASCULAR ACCIDENT (CVA) DUE TO STENOSIS OF RIGHT MIDDLE CEREBRAL ARTERY (MULTI): Primary | ICD-10-CM

## 2025-04-04 DIAGNOSIS — I10 PRIMARY HYPERTENSION: ICD-10-CM

## 2025-04-08 RX ORDER — ASPIRIN 81 MG/1
81 TABLET ORAL DAILY
Qty: 90 TABLET | Refills: 0 | Status: SHIPPED | OUTPATIENT
Start: 2025-04-08

## 2025-04-08 NOTE — TELEPHONE ENCOUNTER
Follow-up visit: none - due March  Last visit: 11/5/24 rtc 3-4 mo  Last labs: 10/7/24 (Hgb 13.3 slightly low)    Refill request from [facility] pharmacy for the following:   - aspirin 81mg daily  Approved: 90 days but Pt needs to schedule his 3-4 month follow-up for further refills

## 2025-04-16 ENCOUNTER — APPOINTMENT (OUTPATIENT)
Dept: OPHTHALMOLOGY | Facility: CLINIC | Age: 65
End: 2025-04-16
Payer: COMMERCIAL

## 2025-04-17 ENCOUNTER — APPOINTMENT (OUTPATIENT)
Dept: INTEGRATIVE MEDICINE | Facility: CLINIC | Age: 65
End: 2025-04-17
Payer: MEDICARE

## 2025-04-17 DIAGNOSIS — M99.03 SEGMENTAL AND SOMATIC DYSFUNCTION OF LUMBAR REGION: ICD-10-CM

## 2025-04-17 DIAGNOSIS — M54.6 DORSALGIA OF THORACIC REGION: ICD-10-CM

## 2025-04-17 DIAGNOSIS — M99.04 SEGMENTAL AND SOMATIC DYSFUNCTION OF SACRAL REGION: ICD-10-CM

## 2025-04-17 DIAGNOSIS — M25.512 LEFT ANTERIOR SHOULDER PAIN: ICD-10-CM

## 2025-04-17 DIAGNOSIS — M99.02 SEGMENTAL AND SOMATIC DYSFUNCTION OF THORACIC REGION: Primary | ICD-10-CM

## 2025-04-17 PROCEDURE — 98941 CHIROPRACT MANJ 3-4 REGIONS: CPT | Performed by: CHIROPRACTOR

## 2025-04-17 NOTE — PROGRESS NOTES
"Subjective   Patient ID: Elijah Chase \"Ricardo" is a 64 y.o. male who presents April 17, 2025 for chiropractic care.    (6) Medicare    Today, the patient rates their degree of pain as a 5 out of 10 on the numeric pain rating scale.     Markel returns for continued chiropractic care. He reports that he still has been noticing difficulty with left arm movement but has found his treatments to be helpful. He states that he continues to workout regularly at the gym. The patient denies any changes in health since his last encounter and will follow up as scheduled.    ___________________________________________  Initial exam:   Eljiah \"Myriam Chase presents for an initial evaluation and treatment of left sided shoulder pain and low back pain. He states that all his symptoms started in March 2024 following a stroke.. He states that his stroke effected the left side of his body and he has been having complaints along that side ever since. He states that he has been going through physical therapy and many other supportive treatments following his stroke episode. He states that he has been experiencing decreased range motion with some numbness and tingling into the arm. He states that his low back pain also comes and goes. He states that he experiences predominately localized discomfort in the low back, with intermittent numbness and tingling into the left lower extremity. He remarks that he has been experiencing constipation issues since his stroke as well. He also states that he utilizes a cane when ambulating but has become more stable since his stroke.    FINDINGS:  Mild multilevel spondylosis throughout the visualized lumbar spine.  No fracture or subluxation. Soft tissues intact.      IMPRESSION:  Mild lumbar spondylosis.      Objective   Physical Exam  Neurological:      General: No focal deficit present.      Mental Status: He is alert and oriented to person, place, and time.      Cranial Nerves: No dysarthria or facial " asymmetry.      Sensory: Sensation is intact.      Motor: Motor function is intact.      Coordination: Coordination is intact.      Gait: Gait is intact.       Palpation of the following regions revealed:  Thoracic: Thoracic paraspinals left, hypertonicity and tenderness.  Middle trapezius left, hypertonicity and tenderness.  Rhomboids left, hypertonicity and tenderness.  Lumbar: Lumbar paraspinals left, hypertonicity and tenderness.  Quadratus lumborum left, hypertonicity and tenderness.  Thoracolumbar fascia left, hypertonicity and tenderness.  Upper Extremity: Deltoid left, hypertonicity and tenderness.  Levator Scapula left, hypertonicity and tenderness.  Teres major/minor left, hypertonicity and tenderness.  Infraspinatus left, hypertonicity and tenderness.  Subscapularis left, hypertonicity and tenderness.  Supraspinatus left, hypertonicity and tenderness.    Segmental Joint(s): Segmental joint dysfunction was assessed with motion palpation and is identified in the following areas:  Lumbopelvic / Sacral SIJ : L4, L5/S1, and R SIJ    Assessment/Plan   Today's Treatment Included: Spinal manipulation to the following regions of segmental dysfunction identified on examination, using age-appropriate and injury specific force. Segmental Joint(s) Cervical : C6 C7 Segmental Joint(s) Thoracic : T2.   Chiropractic manipulation treatment techniques utilized: Direct Non-force/Joint Mobilization technique  Soft tissue mobilization techniques utilized during treatment: Cupping  Integrative Dry Needling (IDN) - Needles in/out:  17.    Soft-tissue mobilization was performed in the following areas:  Trapezius mm. Upper  and Middle  bilateral, Levator Scap. bilateral, Rhomboids bilateral, Supraspinatus bilateral, Infraspinatus bilateral, Teres mm. bilateral, Subscapularis bilateral, Latissimus Dorsi bilateral, and Deltoid mm. bilateral    Recommended follow-up in 2 week(s).     The patient tolerated today's treatment with  little or no additional discomfort and was instructed to contact the office for questions or concerns.

## 2025-04-27 DIAGNOSIS — E78.49 OTHER HYPERLIPIDEMIA: ICD-10-CM

## 2025-04-28 RX ORDER — ATORVASTATIN CALCIUM 40 MG/1
40 TABLET, FILM COATED ORAL DAILY
Qty: 5 TABLET | Refills: 0 | OUTPATIENT
Start: 2025-04-28

## 2025-04-28 RX ORDER — ATORVASTATIN CALCIUM 80 MG/1
80 TABLET, FILM COATED ORAL DAILY
Qty: 90 TABLET | Refills: 0 | Status: SHIPPED | OUTPATIENT
Start: 2025-04-28

## 2025-05-02 ENCOUNTER — APPOINTMENT (OUTPATIENT)
Dept: PHYSICAL MEDICINE AND REHAB | Facility: CLINIC | Age: 65
End: 2025-05-02
Payer: MEDICARE

## 2025-05-02 VITALS — SYSTOLIC BLOOD PRESSURE: 113 MMHG | DIASTOLIC BLOOD PRESSURE: 67 MMHG | RESPIRATION RATE: 20 BRPM | HEART RATE: 53 BPM

## 2025-05-02 DIAGNOSIS — M54.16 LEFT LUMBAR RADICULITIS: ICD-10-CM

## 2025-05-02 DIAGNOSIS — I69.328 SPEECH OR LANGUAGE DEFICIT, POST-STROKE: ICD-10-CM

## 2025-05-02 DIAGNOSIS — G81.94 LEFT HEMIPARESIS (MULTI): ICD-10-CM

## 2025-05-02 DIAGNOSIS — M75.22 BICEPS TENDONITIS, LEFT: ICD-10-CM

## 2025-05-02 DIAGNOSIS — R53.1 WEAKNESS DUE TO ACUTE STROKE (MULTI): ICD-10-CM

## 2025-05-02 DIAGNOSIS — I63.511 CEREBROVASCULAR ACCIDENT (CVA) DUE TO STENOSIS OF RIGHT MIDDLE CEREBRAL ARTERY (MULTI): ICD-10-CM

## 2025-05-02 DIAGNOSIS — I63.511 CEREBROVASCULAR ACCIDENT (CVA) DUE TO STENOSIS OF RIGHT MIDDLE CEREBRAL ARTERY (MULTI): Primary | ICD-10-CM

## 2025-05-02 DIAGNOSIS — E55.9 VITAMIN D DEFICIENCY, UNSPECIFIED: ICD-10-CM

## 2025-05-02 DIAGNOSIS — I63.9 WEAKNESS DUE TO ACUTE STROKE (MULTI): ICD-10-CM

## 2025-05-02 DIAGNOSIS — I69.354 SPASTIC HEMIPLEGIA OF LEFT NONDOMINANT SIDE AS LATE EFFECT OF CEREBRAL INFARCTION (MULTI): ICD-10-CM

## 2025-05-02 PROCEDURE — 1159F MED LIST DOCD IN RCRD: CPT | Performed by: PHYSICAL MEDICINE & REHABILITATION

## 2025-05-02 PROCEDURE — 1123F ACP DISCUSS/DSCN MKR DOCD: CPT | Performed by: PHYSICAL MEDICINE & REHABILITATION

## 2025-05-02 PROCEDURE — 1125F AMNT PAIN NOTED PAIN PRSNT: CPT | Performed by: PHYSICAL MEDICINE & REHABILITATION

## 2025-05-02 PROCEDURE — G2211 COMPLEX E/M VISIT ADD ON: HCPCS | Performed by: PHYSICAL MEDICINE & REHABILITATION

## 2025-05-02 PROCEDURE — 4010F ACE/ARB THERAPY RXD/TAKEN: CPT | Performed by: PHYSICAL MEDICINE & REHABILITATION

## 2025-05-02 PROCEDURE — 3074F SYST BP LT 130 MM HG: CPT | Performed by: PHYSICAL MEDICINE & REHABILITATION

## 2025-05-02 PROCEDURE — 3078F DIAST BP <80 MM HG: CPT | Performed by: PHYSICAL MEDICINE & REHABILITATION

## 2025-05-02 PROCEDURE — 99214 OFFICE O/P EST MOD 30 MIN: CPT | Performed by: PHYSICAL MEDICINE & REHABILITATION

## 2025-05-02 PROCEDURE — 1036F TOBACCO NON-USER: CPT | Performed by: PHYSICAL MEDICINE & REHABILITATION

## 2025-05-02 RX ORDER — HYDROXYZINE PAMOATE 25 MG/1
25 CAPSULE ORAL NIGHTLY
Qty: 30 CAPSULE | Refills: 2 | Status: SHIPPED | OUTPATIENT
Start: 2025-05-02 | End: 2025-05-02

## 2025-05-02 RX ORDER — VITAMIN E MIXED 400 UNIT
45 CAPSULE ORAL DAILY
COMMUNITY

## 2025-05-02 RX ORDER — DICLOFENAC SODIUM 10 MG/G
4 GEL TOPICAL 4 TIMES DAILY PRN
Qty: 100 G | Refills: 3 | Status: SHIPPED | OUTPATIENT
Start: 2025-05-02

## 2025-05-02 RX ORDER — HYDROXYZINE PAMOATE 25 MG/1
CAPSULE ORAL
Qty: 30 CAPSULE | Refills: 2 | Status: SHIPPED | OUTPATIENT
Start: 2025-05-02

## 2025-05-02 ASSESSMENT — PAIN SCALES - GENERAL: PAINLEVEL_OUTOF10: 5

## 2025-05-02 NOTE — PROGRESS NOTES
HPI  Mr Elijah Chase, a 64 y.o. year old male with pmhx of HTN and DM here with his daughter and brother here for evaluation of stroke. Patient had a right sided stroke 3/8/24 at OSH in [place]. Patient went to inpatient rehabilitation at [facility] for 2 weeks and completed IPR on 3/31/24. They did not set patient up with PT or OT as they were not within the state. Has not been doing formal therapy since discharge from IPR. Deficits following stroke include left sided weakness, difficulties with swallowing, speech, cognition but symptoms have been improving. Reports dysuria, as well as difficulties with urinary urgency, incontinence, foul smelling urine. No urinary issues prior to CVA. Currently urinating with bedside urinal. Also having right sided flank pain. Also having difficulties with vision on left, possible neglect on the left side. Denies any spasticity.    Reports depressed mood, crying, difficulty adjusting. Also having difficulties with sleep. Difficulties staying asleep, taking melatonin sporadically from daughter.     They do not have imaging or records from hospitalization or IPR with him      He was last seen in April 2024. At that time we discussed:  -New PT and OT referrals placed, emphasis on neurologic aspect of therapy  -already has ST ordered through PCP for speech and cog, dysarthria, word finding difficulties. Per family is on normal diet.  -Psych referral placed for adjustment  -UA ordered due to dysuria, discolored urine, frequency. Potential UTI although neurogenic bladder may be contributing  -Handicap placard ordered  -Agree with establishing with neurology, PCP placed neuro referral. Discussed to get records from inpatient hospitalization re location of stroke etc  -BMP ordered for baseline lab and kidney function  -Follow up in 6-8 weeks     Completed outpatient therapy  and does walking and leg exercises but not doing arm and hand exercises.      Has a hard time with sleep, wakes  up and hard to go back to sleep. Melatonin wo relief.  Difficulty w concentration.    Back pain comes and goes. Started one month ago. Middle low back pain,. Comes and goes.  Left leg pain.  Takies       Left shoulder pain anterior/biceps comes and goes. Since the stroke      He was last seen in nov 2024. At that time we discussed:  Stroke Rehab  -completed outpatient speech, PT and OT 2024  --Psych referral placed for adjustment however per daughter would be over 6 month wait. Placed an outside referral for stroke rehab psychology at [facility].  -Handicap placard ordered prior for daughter who is caretaker and poa  -continue fu w neuro  - difficulty w sleep, start trazodone, melatonin wo relief  - once sleep is better might benefit from ssri but patient and family hesitant what to try therapy first     Left lumbar radiculitis, sij dysfunction  - xr l spine  - PT for core rom hep  - referral for chiropractic eval    L biceps tendonitis  - xr left shoulder  - pt   - volataren gel  - tylenol 1000 mg tid prn, discussed no otc nsaids since in [place]      Scheduled for rehab psychology at [facility]. Overall feeling bnetter, mood is overall better, still some down days but good overall.  Doing therapy for shoulder, helping. He is also going to chiropractor for shoulder and back.  Shoulder and back are better.  Voltaren gel doesn't need. Doesn't need tylenol.  Trazadone wo relief,.     Had a few sessions of pt for the shoulder but then had to stop due to insurance?    He is seeing chiropractic for back pain and it is getting better.   States he is seeing stroke psychologist and feels its helpful.  Might need speech therapy.   Also daughter is worried re him sweating a lot when he is eating. Stopped [place].    He was last seen jan 2025. At that time we discussed:  Stroke Rehab  -completed outpatient speech, PT and OT 2024  --Psych referral placed for adjustment however per daughter would be over 6 month wait.  Placed an outside referral for stroke rehab psychology at [facility] now pending,  -Handicap placard ordered prior for daughter who is caretaker and poa  -continue fu w neuro  - difficulty w sleep, can increase trazodone to 2 tablets at night, melatonin wo relief  - once sleep is better might benefit from ssri but patient and family hesitant what to try therapy first  - OT for drivers eval     Left lumbar radiculitis, sij dysfunction  - xr l spine w mild spondylosis  - PT for core rom hep; ciontinue  - referral for chiropractic eval    L biceps tendonitis  - xr left shoulder w mild and moderate arthritis, reviewed w patient  - pt continue  - volataren gel  - tylenol 1000 mg tid prn, discussed no otc nsaids since in [facility] trazadone doesn't keep him asleep. Already taking 100 mg of trazadone at night,      Imaging  Reviewed w patient and personally 05/02/25  Xr L shoulder 11/2024  FINDINGS:  Mild-to-moderate productive degenerative changes of the glenohumeral  joint. Moderate productive degenerative changes of the  acromioclavicular joint. No fracture or dislocation. No osseous  lesion. Soft tissues intact.      IMPRESSION:  Mild-to-moderate glenohumeral and moderate AC osteoarthrosis left  shoulder.      Xr L spine 11/2024  FINDINGS:  Mild multilevel spondylosis throughout the visualized lumbar spine.  No fracture or subluxation. Soft tissues intact.      IMPRESSION:  Mild lumbar spondylosis.    IMAGING: Had done at OSH, no records in system, unclear location of stroke, likely right sided with left sided deficits based on presentation     FUNCTIONAL HISTORY: Using FWW to ambulate, was independent prior to CVA     SH:  Patient ambulates with a FWW. Patient was completed independent prior to CVA. Does need help with dressing. Has rails for bathroom. He is currently living with his daughter and daughters , 1 JANNA, bed and bath on first floor. 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 DP pulses 2+ bilaterally  ABD- soft, ND  SKIN - No rash.    Mild dysarthria, delayed recall/processing     NEURO - UE strength 4+/5 on left, 5/5 on right-  including shoulder abduction, biceps, triceps, wrist extensors, finger flexors, interossei, and   LE strength 4+/5 on left, 5/5 on right -  including hip flexors, knee flexors, knee extensors, ankle dorsiflexors, ankle plantar flexors, and EHL  Sensation - intact to light touch in bilateral lower extremities.  GAIT - Uses cane, spastic gait pattern  Dysdiadochokinesia with difficulty on right, F-N difficulty L worse than right  CN testing: Left sided facial droop, otherwise in tact in cranial nerves (other than VII)  Flat affect    Ttp left biceps  + speeds  Flexion and abduction passive 120 degrees active 160  - impingement    Ttp l spine left more than R  + sit slump test on L    Impression/PLAN:  Elijah Chase is a 64 year old male with pmhx of HTN, DM here for evaluation of impairments after stroke,  likely right sided with left sided deficits from OSH.  Deficits include left sided weakness, speech, cognitive, swallowing difficulties. These have all improved w therapy but continues w spastic gait improving w therapy, now worsening sleep, fatigue and depressed mood.  Also difficulties with depressed mood. Now also presents w left lumbar radiculitis and left shoulder biceps tendonitis that were improving w therapy and chiropractic care.      Stroke Rehab  -completed outpatient speech, PT and OT 2024  --Psych referral placed for adjustment however per daughter would be over 6  month wait. Placed an outside referral for stroke rehab psychology at [facility] now pending,  -Handicap placard ordered prior for daughter who is caretaker and poa  -continue fu w neuro  - difficulty w sleep, continue 100mg trazadone at night, melatonin wo relief  - trail hydroxyzine for sleep as well, 25-50 mg also discussed sleep hygiene  - once sleep is better might benefit from ssri but patient and family hesitant what to try therapy first  - OT for drivers eval  - new speech eval as rec by psychologist, also cog eval  - b13 vitamin d and thyroid \function; discussed impaired sleep can also cause cog impairment     Left lumbar radiculitis, sij dysfunction  - xr l spine w mild spondylosis;   - PT for core rom hep; had to stop due to insurance  - continue chiropractic it is helping him for L spine pain    L biceps tendonitis  - xr left shoulder w mild and moderate arthritis, reviewed w patient  - had to stop therapy due to insurance, gave a script will try another facility  - wants to hold off on injection  - pt continue  - volataren gel  - tylenol 1000 mg tid prn, discussed no otc nsaids since in [facility]    Priyanka Randle MD     Division of PM&R

## 2025-05-11 DIAGNOSIS — I63.511 CEREBROVASCULAR ACCIDENT (CVA) DUE TO STENOSIS OF RIGHT MIDDLE CEREBRAL ARTERY (MULTI): ICD-10-CM

## 2025-05-11 DIAGNOSIS — G81.94 LEFT HEMIPARESIS (MULTI): ICD-10-CM

## 2025-05-11 DIAGNOSIS — I69.354 SPASTIC HEMIPLEGIA OF LEFT NONDOMINANT SIDE AS LATE EFFECT OF CEREBRAL INFARCTION (MULTI): ICD-10-CM

## 2025-05-11 DIAGNOSIS — I69.328 SPEECH OR LANGUAGE DEFICIT, POST-STROKE: ICD-10-CM

## 2025-05-11 DIAGNOSIS — I63.9 WEAKNESS DUE TO ACUTE STROKE (MULTI): ICD-10-CM

## 2025-05-11 DIAGNOSIS — M75.22 BICEPS TENDONITIS, LEFT: ICD-10-CM

## 2025-05-11 DIAGNOSIS — R53.1 WEAKNESS DUE TO ACUTE STROKE (MULTI): ICD-10-CM

## 2025-05-11 DIAGNOSIS — M54.16 LEFT LUMBAR RADICULITIS: ICD-10-CM

## 2025-05-12 RX ORDER — HYDROXYZINE PAMOATE 25 MG/1
CAPSULE ORAL
Qty: 180 CAPSULE | Refills: 1 | Status: SHIPPED | OUTPATIENT
Start: 2025-05-12

## 2025-05-15 ENCOUNTER — APPOINTMENT (OUTPATIENT)
Dept: INTEGRATIVE MEDICINE | Facility: CLINIC | Age: 65
End: 2025-05-15
Payer: MEDICARE

## 2025-05-15 DIAGNOSIS — M25.512 LEFT ANTERIOR SHOULDER PAIN: ICD-10-CM

## 2025-05-15 DIAGNOSIS — M99.03 SEGMENTAL AND SOMATIC DYSFUNCTION OF LUMBAR REGION: ICD-10-CM

## 2025-05-15 DIAGNOSIS — M54.6 DORSALGIA OF THORACIC REGION: ICD-10-CM

## 2025-05-15 DIAGNOSIS — M99.04 SEGMENTAL AND SOMATIC DYSFUNCTION OF SACRAL REGION: ICD-10-CM

## 2025-05-15 DIAGNOSIS — M99.02 SEGMENTAL AND SOMATIC DYSFUNCTION OF THORACIC REGION: Primary | ICD-10-CM

## 2025-05-15 PROCEDURE — 98941 CHIROPRACT MANJ 3-4 REGIONS: CPT | Performed by: CHIROPRACTOR

## 2025-05-16 LAB
25(OH)D3+25(OH)D2 SERPL-MCNC: 66 NG/ML (ref 30–100)
TSH SERPL-ACNC: 2.84 MIU/L (ref 0.4–4.5)
VIT B12 SERPL-MCNC: >2000 PG/ML (ref 200–1100)

## 2025-05-19 DIAGNOSIS — E11.59 TYPE 2 DIABETES MELLITUS WITH OTHER CIRCULATORY COMPLICATION, WITH LONG-TERM CURRENT USE OF INSULIN: Primary | ICD-10-CM

## 2025-05-19 DIAGNOSIS — Z79.4 TYPE 2 DIABETES MELLITUS WITH OTHER CIRCULATORY COMPLICATION, WITH LONG-TERM CURRENT USE OF INSULIN: Primary | ICD-10-CM

## 2025-05-25 DIAGNOSIS — I10 PRIMARY HYPERTENSION: ICD-10-CM

## 2025-05-26 DIAGNOSIS — Z91.89 AT RISK FOR IMPAIRMENT OF SLEEP: ICD-10-CM

## 2025-05-26 DIAGNOSIS — I63.511 CEREBROVASCULAR ACCIDENT (CVA) DUE TO STENOSIS OF RIGHT MIDDLE CEREBRAL ARTERY (MULTI): ICD-10-CM

## 2025-05-27 ENCOUNTER — PATIENT MESSAGE (OUTPATIENT)
Dept: PRIMARY CARE | Facility: CLINIC | Age: 65
End: 2025-05-27
Payer: MEDICARE

## 2025-05-27 DIAGNOSIS — I10 PRIMARY HYPERTENSION: ICD-10-CM

## 2025-05-27 RX ORDER — TRAZODONE HYDROCHLORIDE 50 MG/1
50 TABLET ORAL NIGHTLY
Qty: 90 TABLET | Refills: 1 | Status: SHIPPED | OUTPATIENT
Start: 2025-05-27 | End: 2025-06-26

## 2025-05-29 ENCOUNTER — APPOINTMENT (OUTPATIENT)
Dept: INTEGRATIVE MEDICINE | Facility: CLINIC | Age: 65
End: 2025-05-29
Payer: MEDICARE

## 2025-05-29 DIAGNOSIS — M99.03 SEGMENTAL AND SOMATIC DYSFUNCTION OF LUMBAR REGION: ICD-10-CM

## 2025-05-29 DIAGNOSIS — M99.04 SEGMENTAL AND SOMATIC DYSFUNCTION OF SACRAL REGION: ICD-10-CM

## 2025-05-29 DIAGNOSIS — M54.6 DORSALGIA OF THORACIC REGION: ICD-10-CM

## 2025-05-29 DIAGNOSIS — M99.02 SEGMENTAL AND SOMATIC DYSFUNCTION OF THORACIC REGION: Primary | ICD-10-CM

## 2025-05-29 DIAGNOSIS — M25.512 LEFT ANTERIOR SHOULDER PAIN: ICD-10-CM

## 2025-05-29 PROCEDURE — 98941 CHIROPRACT MANJ 3-4 REGIONS: CPT | Performed by: CHIROPRACTOR

## 2025-05-29 NOTE — PROGRESS NOTES
"Subjective   Patient ID: Elijah Chase \"Ricardo" is a 64 y.o. male who presents May 29, 2025 for chiropractic care.    (7) Medicare    Today, the patient rates their degree of pain as a 5 out of 10 on the numeric pain rating scale.     Markel returns for continued chiropractic care. He states that he has continued to note some difficulty using the left arm. He states that he has been working out regularly and has noticed some strength gains on the left side. The patient denies any changes in health since his last encounter and will follow up as scheduled.    ___________________________________________  Initial exam:   Elijah \"Myriam Chase presents for an initial evaluation and treatment of left sided shoulder pain and low back pain. He states that all his symptoms started in March 2024 following a stroke.. He states that his stroke effected the left side of his body and he has been having complaints along that side ever since. He states that he has been going through physical therapy and many other supportive treatments following his stroke episode. He states that he has been experiencing decreased range motion with some numbness and tingling into the arm. He states that his low back pain also comes and goes. He states that he experiences predominately localized discomfort in the low back, with intermittent numbness and tingling into the left lower extremity. He remarks that he has been experiencing constipation issues since his stroke as well. He also states that he utilizes a cane when ambulating but has become more stable since his stroke.    FINDINGS:  Mild multilevel spondylosis throughout the visualized lumbar spine.  No fracture or subluxation. Soft tissues intact.      IMPRESSION:  Mild lumbar spondylosis.      Objective   Physical Exam  Neurological:      General: No focal deficit present.      Mental Status: He is alert and oriented to person, place, and time.      Cranial Nerves: No dysarthria or facial " asymmetry.      Sensory: Sensation is intact.      Motor: Motor function is intact.      Coordination: Coordination is intact.      Gait: Gait is intact.       Palpation of the following regions revealed:  Thoracic: Thoracic paraspinals left, hypertonicity and tenderness.  Middle trapezius left, hypertonicity and tenderness.  Rhomboids left, hypertonicity and tenderness.  Lumbar: Lumbar paraspinals left, hypertonicity and tenderness.  Quadratus lumborum left, hypertonicity and tenderness.  Thoracolumbar fascia left, hypertonicity and tenderness.  Upper Extremity: Deltoid left, hypertonicity and tenderness.  Levator Scapula left, hypertonicity and tenderness.  Teres major/minor left, hypertonicity and tenderness.  Infraspinatus left, hypertonicity and tenderness.  Subscapularis left, hypertonicity and tenderness.  Supraspinatus left, hypertonicity and tenderness.    Segmental Joint(s): Segmental joint dysfunction was assessed with motion palpation and is identified in the following areas:  Lumbopelvic / Sacral SIJ : L4, L5/S1, and R SIJ    Assessment/Plan   Today's Treatment Included: Spinal manipulation to the following regions of segmental dysfunction identified on examination, using age-appropriate and injury specific force. Segmental Joint(s) Cervical : C6 C7 Segmental Joint(s) Thoracic : T2.   Chiropractic manipulation treatment techniques utilized: Direct Non-force/Joint Mobilization technique  Soft tissue mobilization techniques utilized during treatment: Cupping  Integrative Dry Needling (IDN) - Needles in/out:  17.    Soft-tissue mobilization was performed in the following areas:  Trapezius mm. Upper  and Middle  bilateral, Levator Scap. bilateral, Rhomboids bilateral, Supraspinatus bilateral, Infraspinatus bilateral, Teres mm. bilateral, Subscapularis bilateral, Latissimus Dorsi bilateral, and Deltoid mm. bilateral    Recommended follow-up in 2 week(s).     The patient tolerated today's treatment with  little or no additional discomfort and was instructed to contact the office for questions or concerns.

## 2025-05-29 NOTE — TELEPHONE ENCOUNTER
Medication Name: Lisinopril  Dose: 20 mg tablet   Frequency: Take 1 tablet by mouth once daily   Quantity left: 0  Pharmacy: [facility]     Last appointment: 11/5/24  Last CPE:  Last MCW:  Next appointment: 6/19/25  Next CPE:  Next MCW:

## 2025-05-30 RX ORDER — LISINOPRIL 20 MG/1
20 TABLET ORAL DAILY
Qty: 30 TABLET | Refills: 0 | Status: SHIPPED | OUTPATIENT
Start: 2025-05-30

## 2025-06-02 NOTE — PROGRESS NOTES
"Subjective   Patient ID: Elijah Chase \"Ricardo" is a 64 y.o. male who presents May 15, 2025 for chiropractic care.    (7) Medicare    Today, the patient rates their degree of pain as a 5 out of 10 on the numeric pain rating scale.     Markel returns for continued chiropractic care. He states that he has been doing well. He still presents with left sided shoulder pain but finds that the treatments have been helpful. The patient denies any changes in health since his last encounter and will follow up as scheduled.    ___________________________________________  Initial exam:   Elijah \"Myriam Chase presents for an initial evaluation and treatment of left sided shoulder pain and low back pain. He states that all his symptoms started in March 2024 following a stroke.. He states that his stroke effected the left side of his body and he has been having complaints along that side ever since. He states that he has been going through physical therapy and many other supportive treatments following his stroke episode. He states that he has been experiencing decreased range motion with some numbness and tingling into the arm. He states that his low back pain also comes and goes. He states that he experiences predominately localized discomfort in the low back, with intermittent numbness and tingling into the left lower extremity. He remarks that he has been experiencing constipation issues since his stroke as well. He also states that he utilizes a cane when ambulating but has become more stable since his stroke.    FINDINGS:  Mild multilevel spondylosis throughout the visualized lumbar spine.  No fracture or subluxation. Soft tissues intact.      IMPRESSION:  Mild lumbar spondylosis.      Objective   Physical Exam  Neurological:      General: No focal deficit present.      Mental Status: He is alert and oriented to person, place, and time.      Cranial Nerves: No dysarthria or facial asymmetry.      Sensory: Sensation is intact.     "  Motor: Motor function is intact.      Coordination: Coordination is intact.      Gait: Gait is intact.       Palpation of the following regions revealed:  Thoracic: Thoracic paraspinals left, hypertonicity and tenderness.  Middle trapezius left, hypertonicity and tenderness.  Rhomboids left, hypertonicity and tenderness.  Lumbar: Lumbar paraspinals left, hypertonicity and tenderness.  Quadratus lumborum left, hypertonicity and tenderness.  Thoracolumbar fascia left, hypertonicity and tenderness.  Upper Extremity: Deltoid left, hypertonicity and tenderness.  Levator Scapula left, hypertonicity and tenderness.  Teres major/minor left, hypertonicity and tenderness.  Infraspinatus left, hypertonicity and tenderness.  Subscapularis left, hypertonicity and tenderness.  Supraspinatus left, hypertonicity and tenderness.    Segmental Joint(s): Segmental joint dysfunction was assessed with motion palpation and is identified in the following areas:  Lumbopelvic / Sacral SIJ : L4, L5/S1, and R SIJ    Assessment/Plan   Today's Treatment Included: Spinal manipulation to the following regions of segmental dysfunction identified on examination, using age-appropriate and injury specific force. Segmental Joint(s) Cervical : C6 C7 Segmental Joint(s) Thoracic : T2.   Chiropractic manipulation treatment techniques utilized: Direct Non-force/Joint Mobilization technique  Soft tissue mobilization techniques utilized during treatment: Cupping  Integrative Dry Needling (IDN) - Needles in/out:  17.    Soft-tissue mobilization was performed in the following areas:  Trapezius mm. Upper  and Middle  bilateral, Levator Scap. bilateral, Rhomboids bilateral, Supraspinatus bilateral, Infraspinatus bilateral, Teres mm. bilateral, Subscapularis bilateral, Latissimus Dorsi bilateral, and Deltoid mm. bilateral    Recommended follow-up in 2 week(s).     The patient tolerated today's treatment with little or no additional discomfort and was instructed  to contact the office for questions or concerns.

## 2025-06-10 ENCOUNTER — APPOINTMENT (OUTPATIENT)
Dept: OPHTHALMOLOGY | Facility: CLINIC | Age: 65
End: 2025-06-10
Payer: MEDICARE

## 2025-06-16 ENCOUNTER — APPOINTMENT (OUTPATIENT)
Dept: INTEGRATIVE MEDICINE | Facility: CLINIC | Age: 65
End: 2025-06-16
Payer: MEDICARE

## 2025-06-19 ENCOUNTER — APPOINTMENT (OUTPATIENT)
Dept: PRIMARY CARE | Facility: CLINIC | Age: 65
End: 2025-06-19
Payer: MEDICARE

## 2025-06-19 VITALS
DIASTOLIC BLOOD PRESSURE: 57 MMHG | BODY MASS INDEX: 26.37 KG/M2 | OXYGEN SATURATION: 97 % | HEIGHT: 67 IN | SYSTOLIC BLOOD PRESSURE: 116 MMHG | HEART RATE: 64 BPM | WEIGHT: 168 LBS

## 2025-06-19 DIAGNOSIS — E87.6 HYPOKALEMIA: ICD-10-CM

## 2025-06-19 DIAGNOSIS — F51.01 PRIMARY INSOMNIA: ICD-10-CM

## 2025-06-19 DIAGNOSIS — E11.9 TYPE 2 DIABETES MELLITUS WITHOUT COMPLICATION, WITHOUT LONG-TERM CURRENT USE OF INSULIN: ICD-10-CM

## 2025-06-19 DIAGNOSIS — I10 PRIMARY HYPERTENSION: ICD-10-CM

## 2025-06-19 DIAGNOSIS — R61 HYPERHIDROSIS: ICD-10-CM

## 2025-06-19 DIAGNOSIS — E11.9 TYPE 2 DIABETES MELLITUS WITHOUT COMPLICATION, WITH LONG-TERM CURRENT USE OF INSULIN: Primary | ICD-10-CM

## 2025-06-19 DIAGNOSIS — Z11.59 NEED FOR HEPATITIS C SCREENING TEST: ICD-10-CM

## 2025-06-19 DIAGNOSIS — G81.94 LEFT HEMIPARESIS (MULTI): ICD-10-CM

## 2025-06-19 DIAGNOSIS — I69.354 SPASTIC HEMIPLEGIA OF LEFT NONDOMINANT SIDE AS LATE EFFECT OF CEREBRAL INFARCTION (MULTI): ICD-10-CM

## 2025-06-19 DIAGNOSIS — I63.9 WEAKNESS DUE TO ACUTE STROKE (MULTI): ICD-10-CM

## 2025-06-19 DIAGNOSIS — Z79.4 TYPE 2 DIABETES MELLITUS WITHOUT COMPLICATION, WITH LONG-TERM CURRENT USE OF INSULIN: Primary | ICD-10-CM

## 2025-06-19 DIAGNOSIS — R53.83 OTHER FATIGUE: ICD-10-CM

## 2025-06-19 DIAGNOSIS — E78.5 DYSLIPIDEMIA: ICD-10-CM

## 2025-06-19 DIAGNOSIS — I63.511 CEREBROVASCULAR ACCIDENT (CVA) DUE TO STENOSIS OF RIGHT MIDDLE CEREBRAL ARTERY (MULTI): ICD-10-CM

## 2025-06-19 DIAGNOSIS — R53.1 WEAKNESS DUE TO ACUTE STROKE (MULTI): ICD-10-CM

## 2025-06-19 PROCEDURE — 1159F MED LIST DOCD IN RCRD: CPT | Performed by: FAMILY MEDICINE

## 2025-06-19 PROCEDURE — 3078F DIAST BP <80 MM HG: CPT | Performed by: FAMILY MEDICINE

## 2025-06-19 PROCEDURE — 3008F BODY MASS INDEX DOCD: CPT | Performed by: FAMILY MEDICINE

## 2025-06-19 PROCEDURE — 99214 OFFICE O/P EST MOD 30 MIN: CPT | Performed by: FAMILY MEDICINE

## 2025-06-19 PROCEDURE — 4010F ACE/ARB THERAPY RXD/TAKEN: CPT | Performed by: FAMILY MEDICINE

## 2025-06-19 PROCEDURE — 3074F SYST BP LT 130 MM HG: CPT | Performed by: FAMILY MEDICINE

## 2025-06-19 PROCEDURE — G2211 COMPLEX E/M VISIT ADD ON: HCPCS | Performed by: FAMILY MEDICINE

## 2025-06-19 PROCEDURE — 1160F RVW MEDS BY RX/DR IN RCRD: CPT | Performed by: FAMILY MEDICINE

## 2025-06-19 RX ORDER — HYDROXYZINE PAMOATE 25 MG/1
CAPSULE ORAL
Qty: 180 CAPSULE | Refills: 1 | Status: SHIPPED | OUTPATIENT
Start: 2025-06-19

## 2025-06-19 RX ORDER — BLOOD-GLUCOSE SENSOR
EACH MISCELLANEOUS
Qty: 2 EACH | Refills: 3 | Status: SHIPPED | OUTPATIENT
Start: 2025-06-19

## 2025-06-19 RX ORDER — LISINOPRIL 20 MG/1
20 TABLET ORAL DAILY
Qty: 90 TABLET | Refills: 1 | Status: SHIPPED | OUTPATIENT
Start: 2025-06-19

## 2025-06-19 RX ORDER — ATORVASTATIN CALCIUM 80 MG/1
80 TABLET, FILM COATED ORAL DAILY
Qty: 90 TABLET | Refills: 1 | Status: SHIPPED | OUTPATIENT
Start: 2025-06-19

## 2025-06-19 RX ORDER — METFORMIN HYDROCHLORIDE 500 MG/1
500 TABLET, EXTENDED RELEASE ORAL DAILY
Qty: 90 TABLET | Refills: 1 | Status: SHIPPED | OUTPATIENT
Start: 2025-06-19

## 2025-06-19 ASSESSMENT — PATIENT HEALTH QUESTIONNAIRE - PHQ9
7. TROUBLE CONCENTRATING ON THINGS, SUCH AS READING THE NEWSPAPER OR WATCHING TELEVISION: NOT AT ALL
6. FEELING BAD ABOUT YOURSELF - OR THAT YOU ARE A FAILURE OR HAVE LET YOURSELF OR YOUR FAMILY DOWN: NOT AT ALL
SUM OF ALL RESPONSES TO PHQ9 QUESTIONS 1 AND 2: 0
8. MOVING OR SPEAKING SO SLOWLY THAT OTHER PEOPLE COULD HAVE NOTICED. OR THE OPPOSITE, BEING SO FIGETY OR RESTLESS THAT YOU HAVE BEEN MOVING AROUND A LOT MORE THAN USUAL: NOT AT ALL
9. THOUGHTS THAT YOU WOULD BE BETTER OFF DEAD, OR OF HURTING YOURSELF: NOT AT ALL
1. LITTLE INTEREST OR PLEASURE IN DOING THINGS: NOT AT ALL
2. FEELING DOWN, DEPRESSED OR HOPELESS: NOT AT ALL
4. FEELING TIRED OR HAVING LITTLE ENERGY: NEARLY EVERY DAY

## 2025-06-19 ASSESSMENT — ANXIETY QUESTIONNAIRES
GAD7 TOTAL SCORE: 3
5. BEING SO RESTLESS THAT IT IS HARD TO SIT STILL: NOT AT ALL
7. FEELING AFRAID AS IF SOMETHING AWFUL MIGHT HAPPEN: NOT AT ALL
IF YOU CHECKED OFF ANY PROBLEMS ON THIS QUESTIONNAIRE, HOW DIFFICULT HAVE THESE PROBLEMS MADE IT FOR YOU TO DO YOUR WORK, TAKE CARE OF THINGS AT HOME, OR GET ALONG WITH OTHER PEOPLE: NOT DIFFICULT AT ALL
6. BECOMING EASILY ANNOYED OR IRRITABLE: NOT AT ALL
4. TROUBLE RELAXING: SEVERAL DAYS
3. WORRYING TOO MUCH ABOUT DIFFERENT THINGS: SEVERAL DAYS
1. FEELING NERVOUS, ANXIOUS, OR ON EDGE: NOT AT ALL
2. NOT BEING ABLE TO STOP OR CONTROL WORRYING: SEVERAL DAYS

## 2025-06-19 NOTE — PROGRESS NOTES
"Subjective     HPI   History of Present Illness  Elijah Chase \"Gene\" is a 64-year-old male who presents for follow-up and medication refills.    DM TYPE 2:  DIABETES PREVENTATIVE CARE:  Fasting Labs:    ordered today  Optho Care: due - utd  Foot Exam:   Hba1c:   HEMOGLOBIN A1c (%)   Date Value   06/19/2025 5.7 (H)     Microalbumin:   Albumin (g/dL)   Date Value   11/05/2024 4.5       He has not been monitoring his blood glucose levels at home but reports that they have been consistently below 7. He continues to take metformin once daily without any gastrointestinal side effects. He uses a fingerstick method for blood glucose monitoring and has not attempted to obtain coverage for a continuous glucose monitor (CGM). He recalls experiencing sweating when he was on metformin prior to his stroke.  - Character: Blood glucose levels consistently below 7.  - Alleviating/Aggravating Factors: Metformin once daily without gastrointestinal side effects.  - Timing: Not monitoring blood glucose levels at home.    Excessive Sweating During Meals  He experiences excessive sweating during meals, resulting in a wet head. He has not checked his blood sugar levels post-meal to determine if there is a correlation with his symptoms. He reports no postprandial diarrhea.  - Onset: During meals.  - Location: Head.  - Character: Excessive sweating.  - Severity: Results in a wet head.    INSOMNIA:  He is currently taking trazodone and hydroxyzine for sleep, with the latter proving more effective. He continues to wake up at night to use the bathroom but is able to return to sleep.  - Character: Wakes up at night to use the bathroom but can return to sleep.  - Alleviating Factors: Hydroxyzine proving more effective for sleep.    SHOULDER PAIN:  He is currently using a cream for shoulder pain, which he finds effective.  - Location: Shoulder.  - Character: Pain.  - Alleviating Factors: Using a cream which he finds effective.    General Health " "and Medication Usage  He is also on baby aspirin, atorvastatin, and lisinopril, the latter of which he requires a refill. He reports low potassium levels and low testosterone levels. He has not had any recent falls and maintains an active lifestyle by attending the gym daily. He has not experienced any headaches, chest pain, numbness, or tingling in his feet. He has an upcoming appointment with an ophthalmologist for a diabetic eye exam. He also has a scheduled appointment with Dr. Hwang, a neurologist, in 2026. He reports no shortness of breath, ankle swelling, or arm weakness. He occasionally uses a cane for support and has not had any falls in the past 6 months. He continues to work and has completed his disability paperwork. He underwent a colonoscopy less than a year ago, which yielded normal results.    HYPERLIPIDEMIA:  Patient is tolerating regular Atorvastatin (Lipitor) dosing without muscle achiness or weakness.  Review of last fasting lipids with good control.  Lab Results   Component Value Date    CHOL 115 06/18/2024     Lab Results   Component Value Date    HDL 39.6 06/18/2024     Lab Results   Component Value Date    LDLCALC 66 06/18/2024     Lab Results   Component Value Date    TRIG 48 06/18/2024     No components found for: \"CHOLHDL\"       Review of Systems  Review of Systems negative except as noted in HPI and Chief complaint.      Objective     VITALS:  /57   Pulse 64   Ht 1.689 m (5' 6.5\")   Wt 76.2 kg (168 lb)   SpO2 97%   BMI 26.71 kg/m²     Physical Exam  Constitutional:       General: He is not in acute distress.     Appearance: Normal appearance. He is not ill-appearing.   HENT:      Head: Normocephalic and atraumatic.   Neck:      Vascular: No carotid bruit.   Cardiovascular:      Rate and Rhythm: Normal rate and regular rhythm.      Pulses: Normal pulses.      Heart sounds: Normal heart sounds. No murmur heard.     No gallop.   Pulmonary:      Effort: Pulmonary effort is normal. "      Breath sounds: Normal breath sounds. No wheezing, rhonchi or rales.   Musculoskeletal:      Cervical back: Normal range of motion and neck supple. No rigidity or tenderness.   Lymphadenopathy:      Cervical: No cervical adenopathy.   Skin:     General: Skin is warm and dry.   Neurological:      Mental Status: He is alert.   Psychiatric:         Mood and Affect: Mood normal.         Behavior: Behavior normal.       Assessment & Plan  Type 2 diabetes mellitus without complication, with long-term current use of insulin  Rechecking labs - will adjust pending results.  Continue medications at current dosage.  Follow-up  3 MONTHS   Orders:    Comprehensive Metabolic Panel; Future    Albumin-Creatinine Ratio, Urine Random; Future    Hemoglobin A1C; Future    Dyslipidemia  Stable  Rechecking fasting labs - will adjust medications accordingly  Follow-up  6 MONTHS   Orders:    Lipid Panel; Future    atorvastatin (Lipitor) 80 mg tablet; Take 1 tablet (80 mg) by mouth once daily.    Weakness due to acute stroke (Multi)  Improving - continue with home exercises and call if wanting to resume formal PT.       Left hemiparesis (Multi)  Improved - continue with home exercises and falls risk protocols.       Spastic hemiplegia of left nondominant side as late effect of cerebral infarction (Multi)   Shoulder Pain.  - Reports shoulder cream is effective.       Cerebrovascular accident (CVA) due to stenosis of right middle cerebral artery (Multi)  Symptoms improved and stable.  Follow up annually with neurology and every 3-4 months with our office for management of other issues.       Primary hypertension  BP controlled.  Refilling medications at current dosage.  Follow-up  3 MONTHS   Orders:    lisinopril 20 mg tablet; Take 1 tablet (20 mg) by mouth once daily.    Type 2 diabetes mellitus without complication, without long-term current use of insulin  Diabetes Mellitus: Stable.  - Reports blood sugars averaging <7.  - Increased  sweating when eating; potential blood sugar fluctuations.  - Dexcom sensor prescribed for continuous glucose monitoring; clinical pharmacist to assist if initial approval is not granted.  - Consider alternative medication if blood glucose levels are uncontrolled.  Orders:    metFORMIN  mg 24 hr tablet; Take 1 tablet (500 mg) by mouth early in the morning.. Do not crush, chew, or split.    Dexcom G7 Sensor device; Apply to skin per package and change every 10 days    Referral to Clinical Pharmacy; Future    Other fatigue   - Reports concerns re: low testosterone levels and requests treatment.  - Blood work to be conducted today to check testosterone levels; referral to endocrinology if levels are low.  Orders:    Testosterone, total and free; Future    Tsh With Reflex To Free T4 If Abnormal; Future    Need for hepatitis C screening test    Orders:    Hepatitis C antibody; Future    Hyperhidrosis  - Excessive sweating when eating.  - Dexcom sensor to determine if related to blood sugar fluctuations.  - Adjustments to diabetes management plan will be considered based on sensor data.         Primary insomnia  - Hydroxyzine preferred over trazodone due to fewer side effects.  - Continues using hydroxyzine for sleep.  Orders:    hydrOXYzine pamoate (Vistaril) 25 mg capsule; TAKE 1-2 CAPSULES BY MOUTH AT NIGHT    Hypokalemia  Hypokalemia.  - Reports low potassium levels.  - Blood work to be conducted today to check potassium levels.         Assessment & Plan  Medication Management.  - Currently taking baby aspirin, atorvastatin, and lisinopril.  - Refills for all medications sent to pharmacy.    Health Maintenance.  - Had a normal colonoscopy <1 year ago.  - Records from Dr. Starr at [facility] in [place] to be requested.  - Advised to see an eye doctor for diabetes-related eye check and ensure records are sent to this office.    Follow-up  - Blood work to be conducted today.  - Referral to clinical pharmacist  for Dexcom sensor approval.  - Referral to endocrinology if testosterone levels are low.  - Records from Dr. Starr at [facility] in [place] to be requested.         FOLLOW UP 3 months for follow up , sooner for Welcome to Medicare.    Radha Underwood DO 06/28/25 5:27 PM    This medical note was created with the assistance of artificial intelligence (AI) for documentation purposes. The content has been reviewed and confirmed by the healthcare provider for accuracy and completeness. Patient consented to the use of audio recording and use of AI during their visit.

## 2025-06-19 NOTE — ASSESSMENT & PLAN NOTE
Stable  Rechecking fasting labs - will adjust medications accordingly  Follow-up  6 MONTHS   Orders:    Lipid Panel; Future    atorvastatin (Lipitor) 80 mg tablet; Take 1 tablet (80 mg) by mouth once daily.

## 2025-06-19 NOTE — PATIENT INSTRUCTIONS
Prescription for Dexcom CGM (continuous glucose monitor) sent to pharmacy - if not covered let us know and we will have our Clinical  Pharmacist help with coverage options.

## 2025-06-20 LAB
EST. AVERAGE GLUCOSE BLD GHB EST-MCNC: 117 MG/DL
EST. AVERAGE GLUCOSE BLD GHB EST-SCNC: 6.5 MMOL/L
HBA1C MFR BLD: 5.7 %
HCV AB SERPL QL IA: NORMAL
TESTOST FREE SERPL-MCNC: NORMAL PG/ML
TESTOST SERPL-MCNC: NORMAL NG/DL
TSH SERPL-ACNC: 1.38 MIU/L (ref 0.4–4.5)

## 2025-06-23 LAB
EST. AVERAGE GLUCOSE BLD GHB EST-MCNC: 117 MG/DL
EST. AVERAGE GLUCOSE BLD GHB EST-SCNC: 6.5 MMOL/L
HBA1C MFR BLD: 5.7 %
HCV AB SERPL QL IA: NORMAL
TESTOST FREE SERPL-MCNC: 62.3 PG/ML (ref 35–155)
TESTOST SERPL-MCNC: 629 NG/DL (ref 250–1100)
TSH SERPL-ACNC: 1.38 MIU/L (ref 0.4–4.5)

## 2025-06-24 LAB
ALBUMIN/CREAT UR: 6 MG/G CREAT
CREAT UR-MCNC: 153 MG/DL (ref 20–320)
MICROALBUMIN UR-MCNC: 0.9 MG/DL

## 2025-06-28 NOTE — ASSESSMENT & PLAN NOTE
BP controlled.  Refilling medications at current dosage.  Follow-up  3 MONTHS   Orders:    lisinopril 20 mg tablet; Take 1 tablet (20 mg) by mouth once daily.

## 2025-06-28 NOTE — ASSESSMENT & PLAN NOTE
Improving - continue with home exercises and call if wanting to resume formal PT.        AICD (automatic cardioverter/defibrillator) present  with pacemaker 2015  H/O coronary angiogram  stented x4 2014  S/P CABG (coronary artery bypass graft)    S/P hernia repair    S/P tonsillectomy

## 2025-06-28 NOTE — ASSESSMENT & PLAN NOTE
Symptoms improved and stable.  Follow up annually with neurology and every 3-4 months with our office for management of other issues.

## 2025-06-30 RX ORDER — ASPIRIN 81 MG/1
81 TABLET ORAL DAILY
Qty: 90 TABLET | Refills: 0 | Status: SHIPPED | OUTPATIENT
Start: 2025-06-30

## 2025-07-02 ENCOUNTER — APPOINTMENT (OUTPATIENT)
Dept: PHARMACY | Facility: HOSPITAL | Age: 65
End: 2025-07-02
Payer: MEDICARE

## 2025-07-02 RX ORDER — LISINOPRIL 20 MG/1
20 TABLET ORAL DAILY
Qty: 90 TABLET | Refills: 1 | OUTPATIENT
Start: 2025-07-02

## 2025-07-10 ENCOUNTER — APPOINTMENT (OUTPATIENT)
Dept: PHARMACY | Facility: HOSPITAL | Age: 65
End: 2025-07-10
Payer: MEDICARE

## 2025-08-07 ENCOUNTER — APPOINTMENT (OUTPATIENT)
Dept: PHARMACY | Facility: HOSPITAL | Age: 65
End: 2025-08-07
Payer: MEDICARE

## 2025-08-22 ENCOUNTER — APPOINTMENT (OUTPATIENT)
Dept: PHYSICAL MEDICINE AND REHAB | Facility: CLINIC | Age: 65
End: 2025-08-22
Payer: MEDICARE

## 2025-08-27 ENCOUNTER — APPOINTMENT (OUTPATIENT)
Dept: INTEGRATIVE MEDICINE | Facility: CLINIC | Age: 65
End: 2025-08-27
Payer: MEDICARE

## 2025-08-27 DIAGNOSIS — M25.512 LEFT ANTERIOR SHOULDER PAIN: ICD-10-CM

## 2025-08-27 DIAGNOSIS — M99.02 SEGMENTAL AND SOMATIC DYSFUNCTION OF THORACIC REGION: ICD-10-CM

## 2025-08-27 DIAGNOSIS — M99.04 SEGMENTAL AND SOMATIC DYSFUNCTION OF SACRAL REGION: ICD-10-CM

## 2025-08-27 DIAGNOSIS — M99.03 SEGMENTAL AND SOMATIC DYSFUNCTION OF LUMBAR REGION: ICD-10-CM

## 2025-08-27 DIAGNOSIS — M54.6 DORSALGIA OF THORACIC REGION: Primary | ICD-10-CM

## 2025-08-27 DIAGNOSIS — M54.16 LEFT LUMBAR RADICULOPATHY: ICD-10-CM

## 2025-08-27 PROCEDURE — 98941 CHIROPRACT MANJ 3-4 REGIONS: CPT | Performed by: CHIROPRACTOR

## 2025-09-05 ENCOUNTER — APPOINTMENT (OUTPATIENT)
Dept: PHYSICAL MEDICINE AND REHAB | Facility: CLINIC | Age: 65
End: 2025-09-05
Payer: MEDICARE

## 2025-09-08 ENCOUNTER — APPOINTMENT (OUTPATIENT)
Dept: PHARMACY | Facility: HOSPITAL | Age: 65
End: 2025-09-08
Payer: MEDICARE

## 2025-09-18 ENCOUNTER — APPOINTMENT (OUTPATIENT)
Dept: INTEGRATIVE MEDICINE | Facility: CLINIC | Age: 65
End: 2025-09-18
Payer: MEDICARE

## 2025-09-19 ENCOUNTER — APPOINTMENT (OUTPATIENT)
Dept: PRIMARY CARE | Facility: CLINIC | Age: 65
End: 2025-09-19
Payer: MEDICARE

## 2025-10-10 ENCOUNTER — APPOINTMENT (OUTPATIENT)
Dept: PHYSICAL MEDICINE AND REHAB | Facility: CLINIC | Age: 65
End: 2025-10-10
Payer: MEDICARE